# Patient Record
Sex: FEMALE | Race: BLACK OR AFRICAN AMERICAN
[De-identification: names, ages, dates, MRNs, and addresses within clinical notes are randomized per-mention and may not be internally consistent; named-entity substitution may affect disease eponyms.]

---

## 2017-05-05 ENCOUNTER — APPOINTMENT (OUTPATIENT)
Dept: ELECTROPHYSIOLOGY | Facility: CLINIC | Age: 82
End: 2017-05-05

## 2017-05-05 VITALS
HEART RATE: 64 BPM | DIASTOLIC BLOOD PRESSURE: 72 MMHG | BODY MASS INDEX: 39.51 KG/M2 | WEIGHT: 223 LBS | HEIGHT: 63 IN | SYSTOLIC BLOOD PRESSURE: 178 MMHG

## 2017-08-21 ENCOUNTER — APPOINTMENT (OUTPATIENT)
Dept: ELECTROPHYSIOLOGY | Facility: CLINIC | Age: 82
End: 2017-08-21

## 2017-11-27 ENCOUNTER — APPOINTMENT (OUTPATIENT)
Dept: ELECTROPHYSIOLOGY | Facility: CLINIC | Age: 82
End: 2017-11-27

## 2017-12-14 ENCOUNTER — APPOINTMENT (OUTPATIENT)
Dept: ELECTROPHYSIOLOGY | Facility: CLINIC | Age: 82
End: 2017-12-14
Payer: MEDICARE

## 2017-12-14 PROCEDURE — 93294 REM INTERROG EVL PM/LDLS PM: CPT

## 2017-12-14 PROCEDURE — 93296 REM INTERROG EVL PM/IDS: CPT

## 2018-03-21 ENCOUNTER — APPOINTMENT (OUTPATIENT)
Dept: ELECTROPHYSIOLOGY | Facility: CLINIC | Age: 83
End: 2018-03-21
Payer: MEDICARE

## 2018-03-21 PROCEDURE — 93296 REM INTERROG EVL PM/IDS: CPT

## 2018-03-21 PROCEDURE — 93294 REM INTERROG EVL PM/LDLS PM: CPT

## 2018-05-09 ENCOUNTER — APPOINTMENT (OUTPATIENT)
Dept: ELECTROPHYSIOLOGY | Facility: CLINIC | Age: 83
End: 2018-05-09
Payer: MEDICARE

## 2018-05-09 VITALS — BODY MASS INDEX: 39.51 KG/M2 | WEIGHT: 223 LBS | HEIGHT: 63 IN

## 2018-05-09 VITALS — DIASTOLIC BLOOD PRESSURE: 68 MMHG | SYSTOLIC BLOOD PRESSURE: 148 MMHG

## 2018-05-09 PROCEDURE — 93280 PM DEVICE PROGR EVAL DUAL: CPT

## 2018-11-29 ENCOUNTER — APPOINTMENT (OUTPATIENT)
Dept: ELECTROPHYSIOLOGY | Facility: CLINIC | Age: 83
End: 2018-11-29
Payer: MEDICARE

## 2018-11-29 PROCEDURE — 93294 REM INTERROG EVL PM/LDLS PM: CPT

## 2018-11-29 PROCEDURE — 93296 REM INTERROG EVL PM/IDS: CPT

## 2019-01-07 ENCOUNTER — INPATIENT (INPATIENT)
Facility: HOSPITAL | Age: 84
LOS: 6 days | Discharge: HOME | End: 2019-01-14
Attending: INTERNAL MEDICINE | Admitting: INTERNAL MEDICINE
Payer: MEDICARE

## 2019-01-07 VITALS
TEMPERATURE: 98 F | HEIGHT: 63 IN | WEIGHT: 210.1 LBS | RESPIRATION RATE: 20 BRPM | OXYGEN SATURATION: 99 % | HEART RATE: 67 BPM

## 2019-01-07 PROCEDURE — 99285 EMERGENCY DEPT VISIT HI MDM: CPT | Mod: 25

## 2019-01-07 RX ORDER — MECLIZINE HCL 12.5 MG
25 TABLET ORAL ONCE
Qty: 0 | Refills: 0 | Status: COMPLETED | OUTPATIENT
Start: 2019-01-07 | End: 2019-01-07

## 2019-01-07 NOTE — ED PROVIDER NOTE - EYES, MLM
Clear bilaterally, pupils equal, round and reactive to light. eomi positive horizontal nystagmus. no verticle nystagmus

## 2019-01-08 DIAGNOSIS — Z95.0 PRESENCE OF CARDIAC PACEMAKER: Chronic | ICD-10-CM

## 2019-01-08 DIAGNOSIS — Z90.49 ACQUIRED ABSENCE OF OTHER SPECIFIED PARTS OF DIGESTIVE TRACT: Chronic | ICD-10-CM

## 2019-01-08 DIAGNOSIS — Z90.710 ACQUIRED ABSENCE OF BOTH CERVIX AND UTERUS: Chronic | ICD-10-CM

## 2019-01-08 LAB
ABO RH CONFIRMATION: SIGNIFICANT CHANGE UP
ADD ON TEST-SPECIMEN IN LAB: SIGNIFICANT CHANGE UP
ALBUMIN SERPL ELPH-MCNC: 3.2 G/DL — LOW (ref 3.3–5)
ALP SERPL-CCNC: 71 U/L — SIGNIFICANT CHANGE UP (ref 40–120)
ALT FLD-CCNC: 31 U/L — SIGNIFICANT CHANGE UP (ref 12–78)
ANION GAP SERPL CALC-SCNC: 7 MMOL/L — SIGNIFICANT CHANGE UP (ref 5–17)
ANION GAP SERPL CALC-SCNC: 8 MMOL/L — SIGNIFICANT CHANGE UP (ref 5–17)
AST SERPL-CCNC: 33 U/L — SIGNIFICANT CHANGE UP (ref 15–37)
BILIRUB SERPL-MCNC: 0.4 MG/DL — SIGNIFICANT CHANGE UP (ref 0.2–1.2)
BLD GP AB SCN SERPL QL: SIGNIFICANT CHANGE UP
BUN SERPL-MCNC: 36 MG/DL — HIGH (ref 7–23)
BUN SERPL-MCNC: 38 MG/DL — HIGH (ref 7–23)
CALCIUM SERPL-MCNC: 8.2 MG/DL — LOW (ref 8.5–10.1)
CALCIUM SERPL-MCNC: 8.3 MG/DL — LOW (ref 8.5–10.1)
CHLORIDE SERPL-SCNC: 108 MMOL/L — SIGNIFICANT CHANGE UP (ref 96–108)
CHLORIDE SERPL-SCNC: 108 MMOL/L — SIGNIFICANT CHANGE UP (ref 96–108)
CO2 SERPL-SCNC: 27 MMOL/L — SIGNIFICANT CHANGE UP (ref 22–31)
CO2 SERPL-SCNC: 28 MMOL/L — SIGNIFICANT CHANGE UP (ref 22–31)
CREAT SERPL-MCNC: 1.36 MG/DL — HIGH (ref 0.5–1.3)
CREAT SERPL-MCNC: 1.69 MG/DL — HIGH (ref 0.5–1.3)
GLUCOSE BLDC GLUCOMTR-MCNC: 110 MG/DL — HIGH (ref 70–99)
GLUCOSE BLDC GLUCOMTR-MCNC: 131 MG/DL — HIGH (ref 70–99)
GLUCOSE BLDC GLUCOMTR-MCNC: 176 MG/DL — HIGH (ref 70–99)
GLUCOSE SERPL-MCNC: 134 MG/DL — HIGH (ref 70–99)
GLUCOSE SERPL-MCNC: 153 MG/DL — HIGH (ref 70–99)
HCT VFR BLD CALC: 41.3 % — SIGNIFICANT CHANGE UP (ref 34.5–45)
HCT VFR BLD CALC: 42.5 % — SIGNIFICANT CHANGE UP (ref 34.5–45)
HGB BLD-MCNC: 13.1 G/DL — SIGNIFICANT CHANGE UP (ref 11.5–15.5)
HGB BLD-MCNC: 13.7 G/DL — SIGNIFICANT CHANGE UP (ref 11.5–15.5)
INR BLD: 3.73 RATIO — HIGH (ref 0.88–1.16)
MCHC RBC-ENTMCNC: 27.8 PG — SIGNIFICANT CHANGE UP (ref 27–34)
MCHC RBC-ENTMCNC: 28.6 PG — SIGNIFICANT CHANGE UP (ref 27–34)
MCHC RBC-ENTMCNC: 31.7 GM/DL — LOW (ref 32–36)
MCHC RBC-ENTMCNC: 32.2 GM/DL — SIGNIFICANT CHANGE UP (ref 32–36)
MCV RBC AUTO: 87.5 FL — SIGNIFICANT CHANGE UP (ref 80–100)
MCV RBC AUTO: 88.7 FL — SIGNIFICANT CHANGE UP (ref 80–100)
NRBC # BLD: 0 /100 WBCS — SIGNIFICANT CHANGE UP (ref 0–0)
NRBC # BLD: 0 /100 WBCS — SIGNIFICANT CHANGE UP (ref 0–0)
PLATELET # BLD AUTO: 147 K/UL — LOW (ref 150–400)
PLATELET # BLD AUTO: 155 K/UL — SIGNIFICANT CHANGE UP (ref 150–400)
POTASSIUM SERPL-MCNC: 3.9 MMOL/L — SIGNIFICANT CHANGE UP (ref 3.5–5.3)
POTASSIUM SERPL-MCNC: 4 MMOL/L — SIGNIFICANT CHANGE UP (ref 3.5–5.3)
POTASSIUM SERPL-SCNC: 3.9 MMOL/L — SIGNIFICANT CHANGE UP (ref 3.5–5.3)
POTASSIUM SERPL-SCNC: 4 MMOL/L — SIGNIFICANT CHANGE UP (ref 3.5–5.3)
PROT SERPL-MCNC: 6.9 GM/DL — SIGNIFICANT CHANGE UP (ref 6–8.3)
PROTHROM AB SERPL-ACNC: 43.1 SEC — HIGH (ref 10–12.9)
RBC # BLD: 4.72 M/UL — SIGNIFICANT CHANGE UP (ref 3.8–5.2)
RBC # BLD: 4.79 M/UL — SIGNIFICANT CHANGE UP (ref 3.8–5.2)
RBC # FLD: 15.3 % — HIGH (ref 10.3–14.5)
RBC # FLD: 15.9 % — HIGH (ref 10.3–14.5)
SODIUM SERPL-SCNC: 143 MMOL/L — SIGNIFICANT CHANGE UP (ref 135–145)
SODIUM SERPL-SCNC: 143 MMOL/L — SIGNIFICANT CHANGE UP (ref 135–145)
TROPONIN I SERPL-MCNC: 0.13 NG/ML — HIGH (ref 0.01–0.04)
TROPONIN I SERPL-MCNC: 0.13 NG/ML — HIGH (ref 0.01–0.04)
TROPONIN I SERPL-MCNC: 0.15 NG/ML — HIGH (ref 0.01–0.04)
TYPE + AB SCN PNL BLD: SIGNIFICANT CHANGE UP
WBC # BLD: 14.35 K/UL — HIGH (ref 3.8–10.5)
WBC # BLD: 14.55 K/UL — HIGH (ref 3.8–10.5)
WBC # FLD AUTO: 14.35 K/UL — HIGH (ref 3.8–10.5)
WBC # FLD AUTO: 14.55 K/UL — HIGH (ref 3.8–10.5)

## 2019-01-08 PROCEDURE — 93010 ELECTROCARDIOGRAM REPORT: CPT

## 2019-01-08 PROCEDURE — 99222 1ST HOSP IP/OBS MODERATE 55: CPT

## 2019-01-08 PROCEDURE — 70450 CT HEAD/BRAIN W/O DYE: CPT | Mod: 26

## 2019-01-08 PROCEDURE — 71045 X-RAY EXAM CHEST 1 VIEW: CPT | Mod: 26

## 2019-01-08 RX ORDER — DEXTROSE 50 % IN WATER 50 %
12.5 SYRINGE (ML) INTRAVENOUS ONCE
Qty: 0 | Refills: 0 | Status: DISCONTINUED | OUTPATIENT
Start: 2019-01-08 | End: 2019-01-14

## 2019-01-08 RX ORDER — WARFARIN SODIUM 2.5 MG/1
2 TABLET ORAL ONCE
Qty: 0 | Refills: 0 | Status: COMPLETED | OUTPATIENT
Start: 2019-01-08 | End: 2019-01-08

## 2019-01-08 RX ORDER — METOPROLOL TARTRATE 50 MG
50 TABLET ORAL DAILY
Qty: 0 | Refills: 0 | Status: DISCONTINUED | OUTPATIENT
Start: 2019-01-08 | End: 2019-01-08

## 2019-01-08 RX ORDER — ALBUTEROL 90 UG/1
2.5 AEROSOL, METERED ORAL EVERY 6 HOURS
Qty: 0 | Refills: 0 | Status: DISCONTINUED | OUTPATIENT
Start: 2019-01-08 | End: 2019-01-14

## 2019-01-08 RX ORDER — INSULIN LISPRO 100/ML
VIAL (ML) SUBCUTANEOUS
Qty: 0 | Refills: 0 | Status: DISCONTINUED | OUTPATIENT
Start: 2019-01-08 | End: 2019-01-14

## 2019-01-08 RX ORDER — SENNA PLUS 8.6 MG/1
2 TABLET ORAL AT BEDTIME
Qty: 0 | Refills: 0 | Status: DISCONTINUED | OUTPATIENT
Start: 2019-01-08 | End: 2019-01-14

## 2019-01-08 RX ORDER — FUROSEMIDE 40 MG
20 TABLET ORAL DAILY
Qty: 0 | Refills: 0 | Status: DISCONTINUED | OUTPATIENT
Start: 2019-01-08 | End: 2019-01-14

## 2019-01-08 RX ORDER — LOSARTAN POTASSIUM 100 MG/1
100 TABLET, FILM COATED ORAL DAILY
Qty: 0 | Refills: 0 | Status: DISCONTINUED | OUTPATIENT
Start: 2019-01-08 | End: 2019-01-14

## 2019-01-08 RX ORDER — HYDRALAZINE HCL 50 MG
5 TABLET ORAL ONCE
Qty: 0 | Refills: 0 | Status: COMPLETED | OUTPATIENT
Start: 2019-01-08 | End: 2019-01-08

## 2019-01-08 RX ORDER — DIAZEPAM 5 MG
2 TABLET ORAL EVERY 8 HOURS
Qty: 0 | Refills: 0 | Status: DISCONTINUED | OUTPATIENT
Start: 2019-01-08 | End: 2019-01-10

## 2019-01-08 RX ORDER — GLUCAGON INJECTION, SOLUTION 0.5 MG/.1ML
1 INJECTION, SOLUTION SUBCUTANEOUS ONCE
Qty: 0 | Refills: 0 | Status: DISCONTINUED | OUTPATIENT
Start: 2019-01-08 | End: 2019-01-14

## 2019-01-08 RX ORDER — WARFARIN SODIUM 2.5 MG/1
2 TABLET ORAL DAILY
Qty: 0 | Refills: 0 | Status: DISCONTINUED | OUTPATIENT
Start: 2019-01-08 | End: 2019-01-08

## 2019-01-08 RX ORDER — DOCUSATE SODIUM 100 MG
100 CAPSULE ORAL THREE TIMES A DAY
Qty: 0 | Refills: 0 | Status: DISCONTINUED | OUTPATIENT
Start: 2019-01-08 | End: 2019-01-14

## 2019-01-08 RX ORDER — ALBUTEROL 90 UG/1
2.5 AEROSOL, METERED ORAL EVERY 6 HOURS
Qty: 0 | Refills: 0 | Status: DISCONTINUED | OUTPATIENT
Start: 2019-01-08 | End: 2019-01-08

## 2019-01-08 RX ORDER — ACETAMINOPHEN 500 MG
650 TABLET ORAL EVERY 6 HOURS
Qty: 0 | Refills: 0 | Status: DISCONTINUED | OUTPATIENT
Start: 2019-01-08 | End: 2019-01-14

## 2019-01-08 RX ORDER — FLUTICASONE PROPIONATE 50 MCG
1 SPRAY, SUSPENSION NASAL
Qty: 0 | Refills: 0 | Status: DISCONTINUED | OUTPATIENT
Start: 2019-01-08 | End: 2019-01-14

## 2019-01-08 RX ORDER — MECLIZINE HCL 12.5 MG
25 TABLET ORAL ONCE
Qty: 0 | Refills: 0 | Status: COMPLETED | OUTPATIENT
Start: 2019-01-08 | End: 2019-01-08

## 2019-01-08 RX ORDER — DEXTROSE 50 % IN WATER 50 %
15 SYRINGE (ML) INTRAVENOUS ONCE
Qty: 0 | Refills: 0 | Status: DISCONTINUED | OUTPATIENT
Start: 2019-01-08 | End: 2019-01-14

## 2019-01-08 RX ORDER — DILTIAZEM HCL 120 MG
180 CAPSULE, EXT RELEASE 24 HR ORAL DAILY
Qty: 0 | Refills: 0 | Status: DISCONTINUED | OUTPATIENT
Start: 2019-01-08 | End: 2019-01-14

## 2019-01-08 RX ORDER — METOPROLOL TARTRATE 50 MG
50 TABLET ORAL DAILY
Qty: 0 | Refills: 0 | Status: DISCONTINUED | OUTPATIENT
Start: 2019-01-08 | End: 2019-01-14

## 2019-01-08 RX ORDER — SODIUM CHLORIDE 9 MG/ML
1000 INJECTION, SOLUTION INTRAVENOUS
Qty: 0 | Refills: 0 | Status: DISCONTINUED | OUTPATIENT
Start: 2019-01-08 | End: 2019-01-14

## 2019-01-08 RX ORDER — INSULIN GLARGINE 100 [IU]/ML
40 INJECTION, SOLUTION SUBCUTANEOUS AT BEDTIME
Qty: 0 | Refills: 0 | Status: DISCONTINUED | OUTPATIENT
Start: 2019-01-08 | End: 2019-01-14

## 2019-01-08 RX ADMIN — INSULIN GLARGINE 48 UNIT(S): 100 INJECTION, SOLUTION SUBCUTANEOUS at 22:18

## 2019-01-08 RX ADMIN — Medication 50 MILLIGRAM(S): at 19:00

## 2019-01-08 RX ADMIN — Medication 5 MILLIGRAM(S): at 06:06

## 2019-01-08 RX ADMIN — Medication 25 MILLIGRAM(S): at 01:13

## 2019-01-08 RX ADMIN — Medication 180 MILLIGRAM(S): at 18:59

## 2019-01-08 RX ADMIN — Medication 1: at 17:30

## 2019-01-08 RX ADMIN — LOSARTAN POTASSIUM 100 MILLIGRAM(S): 100 TABLET, FILM COATED ORAL at 10:59

## 2019-01-08 RX ADMIN — Medication 25 MILLIGRAM(S): at 23:42

## 2019-01-08 NOTE — H&P ADULT - HISTORY OF PRESENT ILLNESS
89 ear old woman with history of Afib on Coumadin, PPM, HTN, HLD presented to the ED with 2 days history of dizziness + nausea, no vomiting. She was recently treated with medrol steroid for viral bronchitis (completed on SUnday). Patient stated that she feels like the room is spinning even when she closes her eyes. Patient received antivert in the ED and stated that it helped but she still feels dizzy when she moves or changes position. In the ED, Hypertensive with SBP>190- received hydralazine IV and po antihypertensives restarted. +troponin. 89 ear old woman with history of Afib on Coumadin, PPM, HTN, HLD presented to the ED with 2 days history of dizziness + nausea, no vomiting. She was recently treated with medrol steroid for viral bronchitis (completed on SUnday). Patient stated that she feels like the room is spinning even when she closes her eyes. Patient received antivert in the ED and stated that it helped but she still feels dizzy when she moves or changes position. In the ED, Hypertensive with SBP>190- received hydralazine IV and po antihypertensives restarted. +troponin.     [facility]- No family history of stroke or heart disease 89 year old woman with history of Afib on Coumadin, PPM, HTN, HLD presented to the ED with 2 days history of dizziness + nausea, no vomiting. She was recently treated with medrol dose pack for viral bronchitis (completed on Sunday). Patient stated that she feels like the room is spinning even when she closes her eyes. Patient received antivert in the ED and stated that it helped but she still feels dizzy when she moves or changes position. In the ED, Hypertensive with SBP>190- received hydralazine IV and po antihypertensives restarted. +troponin.     FMH- No family history of stroke or heart disease

## 2019-01-08 NOTE — CONSULT NOTE ADULT - ASSESSMENT
89 year old F with history of Afib on Coumadin, PPM, HTN, HLD, recently treated with medrol dose pack for viral bronchitis, presented to the ED with 2 day history of dizziness, nausea, room spinning sensation, exacerbated by postural head/neck movements; In ED given antivert, helped slightly; SBP >190- received hydralazine IV and po antihypertensives, Troponin were positive.     # Most likely Vertigo; positional; preceded by viral bronchitis    # Uncontrolled HTN    # + troponin r/o ACS/ atrial fibrillation- rate controlled    - continue antihypertensives - losartan, Cartia and Metoprolol - home regimen  - Unable to obtain MRI - patient with PPM, if sx perisist will consider CT angio H/N  - monitor BP  - PT 89 year old F with history of Afib on Coumadin, PPM, HTN, HLD, recently treated with medrol dose pack for viral bronchitis, presented to the ED with 2 day history of dizziness, nausea, room spinning sensation, exacerbated by postural head/neck movements; In ED, given antivert, helped slightly; SBP >190- received hydralazine IV and po antihypertensives, Troponin were positive.     # Most likely Vertigo-positional; preceded by viral bronchitis, improving    # Uncontrolled HTN    # + troponin r/o ACS/ atrial fibrillation- rate controlled    - continue antihypertensives - losartan, Cartia and Metoprolol - home regimen  - Unable to obtain MRI - patient with PPM, if sx perisist or recur, will consider CT angio H/N, at present not deemed necessary  - monitor BP  - PT    D/W pt.

## 2019-01-08 NOTE — ED ADULT NURSE NOTE - OBJECTIVE STATEMENT
pt. c/o of dizziness x 2 days, worsens when she changes positions. pt. states dyspnea on exertion. pt. denies CP, weakness, fevers, abd. pain, n/v/d, back pain

## 2019-01-08 NOTE — H&P ADULT - PMH
HTN (hypertension)    Paroxysmal atrial fibrillation    Type 2 diabetes mellitus with complication, with long-term current use of insulin

## 2019-01-08 NOTE — ED ADULT NURSE NOTE - NSIMPLEMENTINTERV_GEN_ALL_ED
Implemented All Fall with Harm Risk Interventions:  [place] to call system. Call bell, personal items and telephone within reach. Instruct patient to call for assistance. Room bathroom lighting operational. Non-slip footwear when patient is off stretcher. Physically safe environment: no spills, clutter or unnecessary equipment. Stretcher in lowest position, wheels locked, appropriate side rails in place. Provide visual cue, wrist band, yellow gown, etc. Monitor gait and stability. Monitor for mental status changes and reorient to person, place, and time. Review medications for side effects contributing to fall risk. Reinforce activity limits and safety measures with patient and family. Provide visual clues: red socks.

## 2019-01-08 NOTE — H&P ADULT - NSHPPHYSICALEXAM_GEN_ALL_CORE
Vital Signs Last 24 Hrs  T(C): 36.9 (08 Jan 2019 09:49), Max: 36.9 (08 Jan 2019 09:49)  T(F): 98.4 (08 Jan 2019 09:49), Max: 98.4 (08 Jan 2019 09:49)  HR: 76 (08 Jan 2019 09:49) (60 - 93)  BP: 193/77 (08 Jan 2019 09:49) (138/76 - 193/87)  BP(mean): --  RR: 19 (08 Jan 2019 09:49) (15 - 20)  SpO2: 99% (08 Jan 2019 09:49) (99% - 99%)    PE:  Constitutional: NAD, laying in bed  HEENT: NC/AT  Back: no tenderness  Respiratory: respirations even and non labored, LCTA- diminished at the base  Cardiovascular: S1S2 irregular, no murmurs  Abdomen: soft, not tender, not distended, positive BS  Genitourinary: voiding  Rectal: deferred  Musculoskeletal: no muscle tenderness, no joint swelling or tenderness  Extremities: no pedal edema   Neurological: no focal deficits

## 2019-01-08 NOTE — H&P ADULT - ASSESSMENT
89 ear old woman with history of Afib on Coumadin, PPM, HTN, HLD presented to the ED with 2 days history of dizziness + nausea, no vomiting. She was recently treated with medrol steroid for viral bronchitis (completed on SUnday). Patient stated that she feels like the room is spinning even when she closes her eyes. Patient received antivert in the ED and stated that it helped but she still feels dizzy when she moves or changes position. In the ED, Hypertensive with SBP>190- received hydralazine IV and po antihypertensives restarted. +troponin. admitted to telemetry.     *positive troponin r/o ACS/ PPM/ Atrial fibrillation- rate controlled  *dizziness likely related to Uncontrolled HTN- patient recently completed Steroid for viral bronchitis  - patient supposed to be on Lasix 30mg daily, but she stopped taking it (stated that she did not need it)  - in the ED- SBP>190 received hydralazine IV x1   - continue losartan, Cartia and Metoprolol - home regimen  - Cardiology consult- sees Dr Weaver as an outpatient  - EKG results noted  - Coumadin - INR supra therapeutic   - CT head - unremarkable  - Unable to obtain MRI - patient with PPM  - monitor BP    * s/p Viral bronchitis  - completed steroids as an outpatient   - on flonase and Albuterol PRN    *DMT2  - ISS  - continue Lantus 48U qHS  - check A1c    VTE- patient on COumadin with supra therapeutic INR    Patient is full code. Case d/w Dr Raymond.

## 2019-01-08 NOTE — ED ADULT NURSE REASSESSMENT NOTE - NS ED NURSE REASSESS COMMENT FT1
Received care of pt A&Ox3 from Antoinette Auguste RN at change of shift. Pt is admitted to hospital, awaiting orders from hospitalist. Pt updated on plan of care. Pt given menu to order hot meal tray. VSS

## 2019-01-08 NOTE — CONSULT NOTE ADULT - SUBJECTIVE AND OBJECTIVE BOX
CC: 88 y old  Female who presents with a chief complaint of dizziness and positive troponin (08 Jan 2019 11:23)    HPI:  89 year old F with history of Afib on Coumadin, PPM, HTN, HLD, recently treated with medrol dose pack for viral bronchitis - completed on Sunday, she presented to the ED with 2 day history of dizziness with nausea.  Patient reports that she feels like the room is spinning, this persists even when she closes her eyes; dizziness is peristent, especially with postural head/neck movements; she received antivert in ED, it helped slightly.    In the ED, SBP >190- received hydralazine IV and po antihypertensives restarted, Troponin were positive.     PAST MEDICAL & SURGICAL HISTORY:  Type 2 diabetes mellitus with complication, with long-term current use of insulin  Paroxysmal atrial fibrillation  HTN (hypertension)  Artificial cardiac pacemaker  History of appendectomy  H/O: hysterectomy    FAMILY HISTORY:  No family history of stroke  relatives    Social Hx:  Nonsmoker, no drug or alcohol use    MEDICATIONS  (STANDING):  dextrose 5%. 1000 milliLiter(s) (50 mL/Hr) IV Continuous <Continuous>  dextrose 50% Injectable 12.5 Gram(s) IV Push once  diltiazem    milliGRAM(s) Oral daily  docusate sodium 100 milliGRAM(s) Oral three times a day  fluticasone propionate 50 MICROgram(s)/spray Nasal Spray 1 Spray(s) Both Nostrils <User Schedule>  furosemide    Tablet 20 milliGRAM(s) Oral daily  insulin glargine Injectable (LANTUS) 48 Unit(s) SubCutaneous at bedtime  insulin lispro (HumaLOG) corrective regimen sliding scale   SubCutaneous three times a day before meals  losartan 100 milliGRAM(s) Oral daily  metoprolol succinate ER 50 milliGRAM(s) Oral daily  multivitamin 1 Tablet(s) Oral daily  warfarin 2 milliGRAM(s) Oral once       Allergies    No Known Allergies    Intolerances      ROS: Pertinent positives in HPI, all other ROS were reviewed and are negative.      Vital Signs Last 24 Hrs  T(C): 36.6 (08 Jan 2019 14:00), Max: 36.9 (08 Jan 2019 09:49)  T(F): 97.8 (08 Jan 2019 14:00), Max: 98.4 (08 Jan 2019 09:49)  HR: 63 (08 Jan 2019 14:00) (60 - 93)  BP: 155/85 (08 Jan 2019 14:00) (138/76 - 193/87)  BP(mean): --  RR: 19 (08 Jan 2019 14:00) (15 - 20)  SpO2: 93% (08 Jan 2019 14:00) (93% - 99%)    PE:  Constitutional: awake and alert.  HEENT: PERRLA, EOMI,   Neck: Supple.  Respiratory: Breath sounds are clear bilaterally  Cardiovascular: S1 and S2,   Extremities:  no edema  Vascular: Caritid Bruit - no  Musculoskeletal: no joint swelling/tenderness, no abnormal movements  Skin: No rashes    Neurological exam:  HF: A x O x 3. Appropriately interactive, normal affect. Speech fluent, No Aphasia or paraphasic errors. Naming /repetition intact   CN: TEN, EOMI, VFF, facial sensation normal, no NLFD, tongue midline, Palate moves equally, SCM equal bilaterally  Motor: No pronator drift, Strength 5/5 in all 4 ext, normal bulk and tone, no tremor, rigidity    Sens: Intact to light touch     Reflexes: Symmetric, AJ 0, downgoing toes b/l  Coord:  No FNFA, dysmetria, LOUIE intact   Gait/Balance: Cannot test    NIHSS:      Labs:   01-08      143  |  108  |  38<H>  ----------------------------<  153<H>  4.0   |  27  |  1.36<H>    Ca    8.3<L>      08 Jan 2019 12:04    TPro  6.9  /  Alb  3.2<L>  /  TBili  0.4  /  DBili  x   /  AST  33  /  ALT  31  /  AlkPhos  71  01-08                      13.1   14.35 )-----------( 155      ( 08 Jan 2019 12:04 )             41.3     Radiology:  - CT Head:   < from: CT Head No Cont (01.08.19 @ 00:37) >  No acute intracranial hemorrhage, mass effect, or evidence of acute   vascular territorial infarction.    If clinical symptoms persist or worsen, more sensitive evaluation with   brain MRI may be obtained, if no contraindications exist.    < end of copied text > CC: 88 y old  Female who presents with a chief complaint of dizziness and positive troponin (08 Jan 2019 11:23)    HPI:  89 year old F with history of Afib on Coumadin, PPM, HTN, HLD, recently treated with medrol dose pack for viral bronchitis - completed on Sunday, presented to the ED with 2 day history of dizziness and nausea. Patient reports that she felt like the room was spinning, persists even when she closes were eyes; dizziness worsened with postural head/neck movements; she received antivert in ED, it helped slightly. Pt denies HA, diplopia, tinnitus, dysarthria, sensorimotor symptoms, her gait was unstable.    In the ED, SBP >190- received hydralazine IV and po antihypertensives restarted, Troponin were positive.     Pts symptoms have improved by now, mild dizziness occurs intermittently with lateral neck movements.    PAST MEDICAL & SURGICAL HISTORY:  Type 2 diabetes mellitus with complication, with long-term current use of insulin  Paroxysmal atrial fibrillation  HTN (hypertension)  Artificial cardiac pacemaker  History of appendectomy  H/O: hysterectomy    FAMILY HISTORY:  No family history of stroke  relatives    Social Hx:  Nonsmoker, no drug or alcohol use    MEDICATIONS  (STANDING):  dextrose 5%. 1000 milliLiter(s) (50 mL/Hr) IV Continuous <Continuous>  dextrose 50% Injectable 12.5 Gram(s) IV Push once  diltiazem    milliGRAM(s) Oral daily  docusate sodium 100 milliGRAM(s) Oral three times a day  fluticasone propionate 50 MICROgram(s)/spray Nasal Spray 1 Spray(s) Both Nostrils <User Schedule>  furosemide    Tablet 20 milliGRAM(s) Oral daily  insulin glargine Injectable (LANTUS) 48 Unit(s) SubCutaneous at bedtime  insulin lispro (HumaLOG) corrective regimen sliding scale   SubCutaneous three times a day before meals  losartan 100 milliGRAM(s) Oral daily  metoprolol succinate ER 50 milliGRAM(s) Oral daily  multivitamin 1 Tablet(s) Oral daily  warfarin 2 milliGRAM(s) Oral once       Allergies    No Known Allergies    Intolerances      ROS: Pertinent positives in HPI, all other ROS were reviewed and are negative.      Vital Signs Last 24 Hrs  T(C): 36.6 (08 Jan 2019 14:00), Max: 36.9 (08 Jan 2019 09:49)  T(F): 97.8 (08 Jan 2019 14:00), Max: 98.4 (08 Jan 2019 09:49)  HR: 63 (08 Jan 2019 14:00) (60 - 93)  BP: 155/85 (08 Jan 2019 14:00) (138/76 - 193/87)  BP(mean): --  RR: 19 (08 Jan 2019 14:00) (15 - 20)  SpO2: 93% (08 Jan 2019 14:00) (93% - 99%)    PE:  Constitutional: Normocephalic, awake and alert, no distress.  HEENT: PERRLA, EOMI, Right lateral nystagmoid beats  Neck: Supple.  Respiratory: Breath sounds are clear bilaterally  Cardiovascular: S1 and S2,   Extremities:  no edema  Vascular: Caritid Bruit - no  Musculoskeletal: no joint swelling/tenderness, no abnormal movements  Skin: No rashes    Neurological exam:  HF: A x O x 3. Appropriately interactive, normal affect. Speech fluent, No Aphasia or paraphasic errors. Naming /repetition intact   CN: TEN, EOMI, Right lateral nystagmus-rotatory, VFF, facial sensation normal, no NLFD, tongue midline, Palate moves equally, SCM equal bilaterally  Motor: No pronator drift, Strength 5/5 in all 4 ext, normal bulk and tone, no tremor, rigidity    Sens: Intact to light touch     Reflexes: Symmetric, AJ 0, downgoing toes b/l  Coord:  No FNFA, dysmetria, LOUIE intact   Gait/Balance: Cannot test    NIHSS: 0      Labs:   01-08      143  |  108  |  38<H>  ----------------------------<  153<H>  4.0   |  27  |  1.36<H>    Ca    8.3<L>      08 Jan 2019 12:04    TPro  6.9  /  Alb  3.2<L>  /  TBili  0.4  /  DBili  x   /  AST  33  /  ALT  31  /  AlkPhos  71  01-08                      13.1   14.35 )-----------( 155      ( 08 Jan 2019 12:04 )             41.3     Radiology:  - CT Head:   < from: CT Head No Cont (01.08.19 @ 00:37) >  No acute intracranial hemorrhage, mass effect, or evidence of acute   vascular territorial infarction.    If clinical symptoms persist or worsen, more sensitive evaluation with   brain MRI may be obtained, if no contraindications exist.    < end of copied text >

## 2019-01-08 NOTE — H&P ADULT - NSHPREVIEWOFSYSTEMS_GEN_ALL_CORE
REVIEW OF SYSTEMS:    CONSTITUTIONAL: No weakness, fevers or chills  EYES/ENT: No visual changes;  + dizziness, +nausea, no vomiting  NECK: No pain or stiffness  RESPIRATORY: No cough, wheezing, hemoptysis; No shortness of breath  CARDIOVASCULAR: No chest pain or palpitations  GASTROINTESTINAL: No abdominal or epigastric pain. + nausea, no vomiting, or hematemesis; No diarrhea or constipation. No melena or hematochezia.  GENITOURINARY: No dysuria, frequency or hematuria  NEUROLOGICAL: No numbness or weakness  SKIN: No itching, burning, rashes, or lesions   All other review of systems is negative unless indicated above.

## 2019-01-08 NOTE — INPATIENT CERTIFICATION FOR MEDICARE PATIENTS - RISKS OF ADVERSE EVENTS
Concern for delay in diagnosis and treatment/Concern for neurologic deterioration/Concern for worsening infectious process/Concern for renal deterioration/Concern for cardiopulmonary deterioration

## 2019-01-09 LAB
ANION GAP SERPL CALC-SCNC: 5 MMOL/L — SIGNIFICANT CHANGE UP (ref 5–17)
APPEARANCE UR: CLEAR — SIGNIFICANT CHANGE UP
APTT BLD: 35.3 SEC — SIGNIFICANT CHANGE UP (ref 27.5–36.3)
BILIRUB UR-MCNC: NEGATIVE — SIGNIFICANT CHANGE UP
BUN SERPL-MCNC: 36 MG/DL — HIGH (ref 7–23)
CALCIUM SERPL-MCNC: 8.3 MG/DL — LOW (ref 8.5–10.1)
CHLORIDE SERPL-SCNC: 109 MMOL/L — HIGH (ref 96–108)
CO2 SERPL-SCNC: 28 MMOL/L — SIGNIFICANT CHANGE UP (ref 22–31)
COLOR SPEC: YELLOW — SIGNIFICANT CHANGE UP
CREAT SERPL-MCNC: 1.24 MG/DL — SIGNIFICANT CHANGE UP (ref 0.5–1.3)
DIFF PNL FLD: NEGATIVE — SIGNIFICANT CHANGE UP
GLUCOSE BLDC GLUCOMTR-MCNC: 118 MG/DL — HIGH (ref 70–99)
GLUCOSE BLDC GLUCOMTR-MCNC: 126 MG/DL — HIGH (ref 70–99)
GLUCOSE BLDC GLUCOMTR-MCNC: 171 MG/DL — HIGH (ref 70–99)
GLUCOSE BLDC GLUCOMTR-MCNC: 81 MG/DL — SIGNIFICANT CHANGE UP (ref 70–99)
GLUCOSE SERPL-MCNC: 63 MG/DL — LOW (ref 70–99)
GLUCOSE UR QL: NEGATIVE MG/DL — SIGNIFICANT CHANGE UP
HBA1C BLD-MCNC: 6.5 % — HIGH (ref 4–5.6)
INR BLD: 2.53 RATIO — HIGH (ref 0.88–1.16)
KETONES UR-MCNC: NEGATIVE — SIGNIFICANT CHANGE UP
LEUKOCYTE ESTERASE UR-ACNC: NEGATIVE — SIGNIFICANT CHANGE UP
NITRITE UR-MCNC: NEGATIVE — SIGNIFICANT CHANGE UP
PH UR: 5 — SIGNIFICANT CHANGE UP (ref 5–8)
POTASSIUM SERPL-MCNC: 3.9 MMOL/L — SIGNIFICANT CHANGE UP (ref 3.5–5.3)
POTASSIUM SERPL-SCNC: 3.9 MMOL/L — SIGNIFICANT CHANGE UP (ref 3.5–5.3)
PROT UR-MCNC: NEGATIVE MG/DL — SIGNIFICANT CHANGE UP
PROTHROM AB SERPL-ACNC: 28.9 SEC — HIGH (ref 10–12.9)
SODIUM SERPL-SCNC: 142 MMOL/L — SIGNIFICANT CHANGE UP (ref 135–145)
SP GR SPEC: 1.02 — SIGNIFICANT CHANGE UP (ref 1.01–1.02)
UROBILINOGEN FLD QL: NEGATIVE MG/DL — SIGNIFICANT CHANGE UP

## 2019-01-09 PROCEDURE — 93880 EXTRACRANIAL BILAT STUDY: CPT | Mod: 26

## 2019-01-09 PROCEDURE — 93306 TTE W/DOPPLER COMPLETE: CPT | Mod: 26

## 2019-01-09 RX ORDER — WARFARIN SODIUM 2.5 MG/1
2 TABLET ORAL ONCE
Qty: 0 | Refills: 0 | Status: COMPLETED | OUTPATIENT
Start: 2019-01-09 | End: 2019-01-09

## 2019-01-09 RX ORDER — FUROSEMIDE 40 MG
1.5 TABLET ORAL
Qty: 0 | Refills: 0 | COMMUNITY

## 2019-01-09 RX ORDER — DILTIAZEM HCL 120 MG
1 CAPSULE, EXT RELEASE 24 HR ORAL
Qty: 0 | Refills: 0 | COMMUNITY

## 2019-01-09 RX ORDER — FLUTICASONE PROPIONATE 220 MCG
1 AEROSOL WITH ADAPTER (GRAM) INHALATION
Qty: 0 | Refills: 0 | COMMUNITY

## 2019-01-09 RX ORDER — METOPROLOL TARTRATE 50 MG
1 TABLET ORAL
Qty: 0 | Refills: 0 | COMMUNITY

## 2019-01-09 RX ADMIN — Medication 20 MILLIGRAM(S): at 12:01

## 2019-01-09 RX ADMIN — Medication 1 TABLET(S): at 12:01

## 2019-01-09 RX ADMIN — WARFARIN SODIUM 2 MILLIGRAM(S): 2.5 TABLET ORAL at 21:23

## 2019-01-09 RX ADMIN — Medication 50 MILLIGRAM(S): at 18:17

## 2019-01-09 RX ADMIN — Medication 180 MILLIGRAM(S): at 18:17

## 2019-01-09 RX ADMIN — INSULIN GLARGINE 48 UNIT(S): 100 INJECTION, SOLUTION SUBCUTANEOUS at 21:22

## 2019-01-09 RX ADMIN — Medication 1 SPRAY(S): at 14:49

## 2019-01-09 RX ADMIN — LOSARTAN POTASSIUM 100 MILLIGRAM(S): 100 TABLET, FILM COATED ORAL at 05:40

## 2019-01-09 NOTE — CONSULT NOTE ADULT - ASSESSMENT
Dizziness probably due to vertigo.  Moderate aortic stenosis.  Paroxysmal atrial fibrillation.  Elevated troponin I but no chest pains or shortness of breath.  Sick sinus syndrome status post permanent pacemaker placement in the past.  Morbid obesity.  Suggest  Observe on telemetry.  Increase oral fluids.  Continue with Antivert for when necessary basis.  Continue with current cardiac medications.  She is allergic to aspirin.  Will check pacemaker.  Echocardiogram to evaluate aortic stenosis and  wall motion.

## 2019-01-09 NOTE — CONSULT NOTE ADULT - SUBJECTIVE AND OBJECTIVE BOX
Patient is a 88y old  Female who presents with a chief complaint of dizziness .      HPI:  Patient is a 88-year-old she has a history of hypertension, type II diabetes mellitus, paroxysmal atrial fibrillation status post permanent pacemaker placement in January 2009, morbid obesity, status post cardiac catheterization in June 2009 she had normal coronaries, moderate aortic stenosis, she was admitted with complains of dizziness, this dizziness is persistent and gets worse when she moves her head. She denies any headache or any blurred vision. At times she feels nauseous. No vomiting. She denies any chest pain. Dyspnea with moderate exertion but overall exercise tolerance has been stable. On admission her troponin I was noted a mildly elevated. But patient has no chest pain or shortness of breath. She continues to be normal sinus rhythm on telemetry with atrial pacing. She is now comfortable but complains of dizziness when she moves her head in certain position.      PAST MEDICAL & SURGICAL HISTORY:  Type 2 diabetes mellitus with complication, with long-term current use of insulin  Paroxysmal atrial fibrillation  HTN (hypertension)  Artificial cardiac pacemaker  History of appendectomy  H/O: hysterectomy      MEDICATIONS  (STANDING):  dextrose 5%. 1000 milliLiter(s) (50 mL/Hr) IV Continuous <Continuous>  dextrose 50% Injectable 12.5 Gram(s) IV Push once  diltiazem    milliGRAM(s) Oral daily  docusate sodium 100 milliGRAM(s) Oral three times a day  fluticasone propionate 50 MICROgram(s)/spray Nasal Spray 1 Spray(s) Both Nostrils <User Schedule>  furosemide    Tablet 20 milliGRAM(s) Oral daily  insulin glargine Injectable (LANTUS) 48 Unit(s) SubCutaneous at bedtime  insulin lispro (HumaLOG) corrective regimen sliding scale   SubCutaneous three times a day before meals  losartan 100 milliGRAM(s) Oral daily  metoprolol succinate ER 50 milliGRAM(s) Oral daily  multivitamin 1 Tablet(s) Oral daily    MEDICATIONS  (PRN):  acetaminophen   Tablet .. 650 milliGRAM(s) Oral every 6 hours PRN Temp greater or equal to 38C (100.4F), Mild Pain (1 - 3)  ALBUTerol    0.083% 2.5 milliGRAM(s) Nebulizer every 6 hours PRN Bronchospasm  dextrose 40% Gel 15 Gram(s) Oral once PRN Blood Glucose LESS THAN 70 milliGRAM(s)/deciliter  diazepam    Tablet 2 milliGRAM(s) Oral every 8 hours PRN vertigo  glucagon  Injectable 1 milliGRAM(s) IntraMuscular once PRN Glucose LESS THAN 70 milligrams/deciliter  senna 2 Tablet(s) Oral at bedtime PRN Constipation      FAMILY HISTORY:  No pertinent family history in first degree relatives      SOCIAL HISTORY: No history of smoking or any alcohol consumption.    REVIEW OF SYSTEM:  Pertinent items are noted in HPI.  Constitutional	Negative for chills, fevers, sweats.    Eyes: 	Negative for visual disturbance.  Ears, nose, mouth, throat, and face: Negative for epistaxis, nasal congestion, sore throat and tinnitus.  Neck:	Negative for enlargement, pain and difficulty in swallowing  Respiration : Negative for cough, dyspnea on exertion, pleuritic chest pain and wheezing  Cardiovascular: Negative for chest pain, dyspnea and palpitations    Gastrointestinal : Negative for abdominal pain, diarrhea, nausea and vomiting  Genitourinary: Negative for dysuria, frequency and urinary incontinence .  Skin: Negative for  rash, pruritus, swelling, dryness .  	  Hematologic/lymphatic: Negative for bleeding and easy bruising  Musculoskeletal: Negative for arthralgias, back pain and muscle weakness.  Neurological: Notable  for dizziness, headaches . No  seizures and tremors  Behavioral/Psych: Negative for mood change, depression.  Endocrine:	Negative for blurry vision, polydipsia and polyuria, diaphoresis.   Allergic/Immunologic:	Negative for anaphylaxis, angioedema and urticaria.      Vital Signs Last 24 Hrs  T(C): 36.7 (09 Jan 2019 10:44), Max: 36.7 (08 Jan 2019 16:15)  T(F): 98 (09 Jan 2019 10:44), Max: 98.1 (08 Jan 2019 16:15)  HR: 64 (09 Jan 2019 10:44) (63 - 76)  BP: 162/53 (09 Jan 2019 10:44) (134/44 - 163/75)  BP(mean): --  RR: 18 (09 Jan 2019 10:44) (17 - 19)  SpO2: 95% (09 Jan 2019 10:44) (91% - 99%)    I&O's Summary    PHYSICAL EXAM  General Appearance: cooperative, no acute distress,   HEENT: PERRL, conjunctiva clear, EOM's intact .   Neck: Supple, , no adenopathy, thyroid: not enlarged, no carotid bruit or JVD  Lungs: Clear to auscultation bilateral,no adventitious breath sounds, normal   expiratory phase  Heart: Regular rate and rhythm, S1, S2 normal,2/6 ES murmur, no  rub or gallop  Abdomen: Soft, non-tender, bowel sounds active , no hepatosplenomegaly  Extremities: no cyanosis or edema, no joint swelling  Skin: Skin color, texture normal, no rashes   Neurologic: Alert and oriented X3 , cranial nerves intact, sensory and motor normal,        INTERPRETATION OF TELEMETRY: A trial pacing     ECG: Atrial pacing nnonspecific ST-T changes.        LABS:                          13.1   14.35 )-----------( 155      ( 08 Jan 2019 12:04 )             41.3     01-09    142  |  109<H>  |  36<H>  ----------------------------<  63<L>  3.9   |  28  |  1.24    Ca    8.3<L>      09 Jan 2019 05:27    TPro  6.9  /  Alb  3.2<L>  /  TBili  0.4  /  DBili  x   /  AST  33  /  ALT  31  /  AlkPhos  71  01-08    CARDIAC MARKERS ( 08 Jan 2019 16:31 )  0.130 ng/mL / x     / x     / x     / x      CARDIAC MARKERS ( 08 Jan 2019 07:35 )  0.128 ng/mL / x     / x     / x     / x      CARDIAC MARKERS ( 08 Jan 2019 00:19 )  0.148 ng/mL / x     / x     / x     / x              PT/INR - ( 09 Jan 2019 05:27 )   PT: 28.9 sec;   INR: 2.53 ratio         PTT - ( 09 Jan 2019 05:27 )  PTT:35.3 sec

## 2019-01-09 NOTE — PROGRESS NOTE ADULT - ASSESSMENT
88/F with history of Afib on Coumadin, PPM, HTN, HLD, admitted with     *positive troponin r/o ACS/ PPM/ Atrial fibrillation- rate controlled  *dizziness likely related to Uncontrolled HTN- patient recently completed Steroid for viral bronchitis  - patient supposed to be on Lasix 30mg daily, but she stopped taking it (stated that she did not need it)  - in the ED- SBP>190 received hydralazine IV x1   - continue losartan, Cartia and Metoprolol - home regimen  - Cardiology consult- sees Dr Weaver as an outpatient, appreciated   - CT head - unremarkable, repeat in am  - Unable to obtain MRI - patient with PPM  - monitor BP,  - carotid doppler showed some plaque b/l  - echo noted  coumadin decreased to 2 mg from home dose of 2.5 sec to elevated INR; monitor INR daily    * s/p Viral bronchitis  - completed steroids as an outpatient   - on flonase and Albuterol PRN    *DMT2  - ISS  - continue Lantus 48U qHS  - checked A1c; 6.5    GURVINDER: resolved  check bmp in am    Leukocytosis: ? source  check in am    VTE- on coumadin    poc discussed with pt, her , team.

## 2019-01-10 LAB
ANION GAP SERPL CALC-SCNC: 5 MMOL/L — SIGNIFICANT CHANGE UP (ref 5–17)
APTT BLD: 29.3 SEC — SIGNIFICANT CHANGE UP (ref 27.5–36.3)
BUN SERPL-MCNC: 36 MG/DL — HIGH (ref 7–23)
CALCIUM SERPL-MCNC: 8.3 MG/DL — LOW (ref 8.5–10.1)
CHLORIDE SERPL-SCNC: 110 MMOL/L — HIGH (ref 96–108)
CO2 SERPL-SCNC: 27 MMOL/L — SIGNIFICANT CHANGE UP (ref 22–31)
CREAT SERPL-MCNC: 1.29 MG/DL — SIGNIFICANT CHANGE UP (ref 0.5–1.3)
GLUCOSE BLDC GLUCOMTR-MCNC: 142 MG/DL — HIGH (ref 70–99)
GLUCOSE BLDC GLUCOMTR-MCNC: 148 MG/DL — HIGH (ref 70–99)
GLUCOSE BLDC GLUCOMTR-MCNC: 160 MG/DL — HIGH (ref 70–99)
GLUCOSE BLDC GLUCOMTR-MCNC: 82 MG/DL — SIGNIFICANT CHANGE UP (ref 70–99)
GLUCOSE SERPL-MCNC: 63 MG/DL — LOW (ref 70–99)
HCT VFR BLD CALC: 38.1 % — SIGNIFICANT CHANGE UP (ref 34.5–45)
HGB BLD-MCNC: 11.8 G/DL — SIGNIFICANT CHANGE UP (ref 11.5–15.5)
INR BLD: 1.94 RATIO — HIGH (ref 0.88–1.16)
MCHC RBC-ENTMCNC: 27.4 PG — SIGNIFICANT CHANGE UP (ref 27–34)
MCHC RBC-ENTMCNC: 31 GM/DL — LOW (ref 32–36)
MCV RBC AUTO: 88.6 FL — SIGNIFICANT CHANGE UP (ref 80–100)
NRBC # BLD: 0 /100 WBCS — SIGNIFICANT CHANGE UP (ref 0–0)
PLATELET # BLD AUTO: 109 K/UL — LOW (ref 150–400)
POTASSIUM SERPL-MCNC: 4 MMOL/L — SIGNIFICANT CHANGE UP (ref 3.5–5.3)
POTASSIUM SERPL-SCNC: 4 MMOL/L — SIGNIFICANT CHANGE UP (ref 3.5–5.3)
PROTHROM AB SERPL-ACNC: 22 SEC — HIGH (ref 10–12.9)
RBC # BLD: 4.3 M/UL — SIGNIFICANT CHANGE UP (ref 3.8–5.2)
RBC # FLD: 16 % — HIGH (ref 10.3–14.5)
SODIUM SERPL-SCNC: 142 MMOL/L — SIGNIFICANT CHANGE UP (ref 135–145)
WBC # BLD: 13.09 K/UL — HIGH (ref 3.8–10.5)
WBC # FLD AUTO: 13.09 K/UL — HIGH (ref 3.8–10.5)

## 2019-01-10 PROCEDURE — 70450 CT HEAD/BRAIN W/O DYE: CPT | Mod: 26

## 2019-01-10 RX ORDER — MECLIZINE HCL 12.5 MG
12.5 TABLET ORAL THREE TIMES A DAY
Qty: 0 | Refills: 0 | Status: DISCONTINUED | OUTPATIENT
Start: 2019-01-10 | End: 2019-01-12

## 2019-01-10 RX ORDER — WARFARIN SODIUM 2.5 MG/1
2.5 TABLET ORAL DAILY
Qty: 0 | Refills: 0 | Status: DISCONTINUED | OUTPATIENT
Start: 2019-01-10 | End: 2019-01-12

## 2019-01-10 RX ORDER — APIXABAN 2.5 MG/1
5 TABLET, FILM COATED ORAL EVERY 12 HOURS
Qty: 0 | Refills: 0 | Status: DISCONTINUED | OUTPATIENT
Start: 2019-01-10 | End: 2019-01-10

## 2019-01-10 RX ADMIN — Medication 12.5 MILLIGRAM(S): at 16:06

## 2019-01-10 RX ADMIN — Medication 1 TABLET(S): at 12:11

## 2019-01-10 RX ADMIN — WARFARIN SODIUM 2.5 MILLIGRAM(S): 2.5 TABLET ORAL at 22:54

## 2019-01-10 RX ADMIN — Medication 180 MILLIGRAM(S): at 05:48

## 2019-01-10 RX ADMIN — LOSARTAN POTASSIUM 100 MILLIGRAM(S): 100 TABLET, FILM COATED ORAL at 05:48

## 2019-01-10 RX ADMIN — INSULIN GLARGINE 40 UNIT(S): 100 INJECTION, SOLUTION SUBCUTANEOUS at 22:54

## 2019-01-10 RX ADMIN — Medication 1: at 17:24

## 2019-01-10 RX ADMIN — Medication 50 MILLIGRAM(S): at 05:48

## 2019-01-10 NOTE — PROGRESS NOTE ADULT - ASSESSMENT
Dizziness probably due to vertigo.  Moderate aortic stenosis.  Paroxysmal atrial fibrillation.  Elevated troponin I but no chest pains or shortness of breath.  Sick sinus syndrome status post permanent pacemaker placement in the past.  Morbid obesity.  Suggest  Observe on telemetry.  Increase oral fluids.  Continue with Antivert for when necessary basis.  Continue with current cardiac medications.  Stop Warfarin  & start Eliquis .   She is allergic to aspirin.

## 2019-01-10 NOTE — PHARMACOTHERAPY INTERVENTION NOTE - INTERVENTION TYPE RECOOMEND
Dose Optimization/Non-renal Dose Adjustment
Therapy Recommended - Alternative treatment
Dose Optimization/Non-renal Dose Adjustment

## 2019-01-10 NOTE — PHARMACOTHERAPY INTERVENTION NOTE - COMMENTS
Spoke to Dr. Le and suggested lowering Lantus dose
Recommended changing valium to meclizine prn dizziness
Spoke to Dr. Le and suggested warfarin dose of 2mg as INR is 2.53
Completed med rec. Home medications reviewed with patient and confirmed with Dr Conn  Patient sporadically takes furosemide. Counseled on the importance of taking it daily as directed

## 2019-01-10 NOTE — PROGRESS NOTE ADULT - ASSESSMENT
88/F with history of Afib on Coumadin, PPM, HTN, HLD, admitted with     *positive troponin r/o ACS/ PPM/ Atrial fibrillation- rate controlled  *dizziness likely related to Uncontrolled HTN- patient recently completed Steroid for viral bronchitis  - patient supposed to be on Lasix 30mg daily, continue  - in the ED- SBP>190 received hydralazine IV x1   - continue losartan, Cartia and Metoprolol - home regimen  - Cardiology consult- with Dr Weaver, appreciated   - CT head - unremarkable, repeat in am  - Unable to obtain MRI - patient with PPM  - monitor BP,  - carotid doppler showed some plaque b/l  - echo noted  -meclizine prn vertigo  coumadin d/brandi; Eliquis started by Dr. Weaver    * s/p Viral bronchitis  - completed steroids as an outpatient   - on flonase and Albuterol PRN    *DMT2  - ISS  - continue Lantus 48U qHS  - checked A1c; 6.5    GURVINDER: resolved  check bmp in am    Leukocytosis: ? source  check in am    Low Normal Finger Sticks in am:  decrease lantus to 40 units q hs, monitor very closely      VTE- on Eliquis    poc discussed with pt, her , team. 88/F with history of Afib on Coumadin, PPM, HTN, HLD, admitted with     *positive troponin r/o ACS/ PPM/ Atrial fibrillation- rate controlled  *dizziness likely related to Uncontrolled HTN- patient recently completed Steroid for viral bronchitis  - patient supposed to be on Lasix 30mg daily, continue  - in the ED- SBP>190 received hydralazine IV x1   - continue losartan, Cartia and Metoprolol - home regimen  - Cardiology consult- with Dr Weaver, appreciated   - CT head - unremarkable, repeat in am  - Unable to obtain MRI - patient with PPM  - monitor BP,  - carotid doppler showed some plaque b/l  - echo noted  -meclizine prn vertigo  coumadin d/brandi; Eliquis started by Dr. Weaver but pt refused; restarted back on home dose od 2.5 mg as INT today was 1.94 on 2 mg    * s/p Viral bronchitis  - completed steroids as an outpatient   - on flonase and Albuterol PRN    *DMT2  - ISS  - continue Lantus 48U qHS  - checked A1c; 6.5    GURVINDER: resolved  check bmp in am    Leukocytosis: ? source  check in am    Low Normal Finger Sticks in am:  decrease lantus to 40 units q hs, monitor very closely      VTE- on coumadin    poc discussed with pt, her , team, Dr. Weaver.

## 2019-01-11 LAB
GLUCOSE BLDC GLUCOMTR-MCNC: 132 MG/DL — HIGH (ref 70–99)
GLUCOSE BLDC GLUCOMTR-MCNC: 161 MG/DL — HIGH (ref 70–99)
GLUCOSE BLDC GLUCOMTR-MCNC: 167 MG/DL — HIGH (ref 70–99)
GLUCOSE BLDC GLUCOMTR-MCNC: 82 MG/DL — SIGNIFICANT CHANGE UP (ref 70–99)
INR BLD: 1.83 RATIO — HIGH (ref 0.88–1.16)
PROTHROM AB SERPL-ACNC: 20.7 SEC — HIGH (ref 10–12.9)

## 2019-01-11 RX ADMIN — LOSARTAN POTASSIUM 100 MILLIGRAM(S): 100 TABLET, FILM COATED ORAL at 05:57

## 2019-01-11 RX ADMIN — Medication 650 MILLIGRAM(S): at 01:01

## 2019-01-11 RX ADMIN — INSULIN GLARGINE 40 UNIT(S): 100 INJECTION, SOLUTION SUBCUTANEOUS at 21:43

## 2019-01-11 RX ADMIN — Medication 1 TABLET(S): at 13:17

## 2019-01-11 RX ADMIN — WARFARIN SODIUM 2.5 MILLIGRAM(S): 2.5 TABLET ORAL at 21:42

## 2019-01-11 RX ADMIN — Medication 650 MILLIGRAM(S): at 22:10

## 2019-01-11 RX ADMIN — Medication 50 MILLIGRAM(S): at 05:57

## 2019-01-11 RX ADMIN — Medication 650 MILLIGRAM(S): at 00:31

## 2019-01-11 RX ADMIN — Medication 1: at 13:20

## 2019-01-11 RX ADMIN — Medication 650 MILLIGRAM(S): at 08:27

## 2019-01-11 RX ADMIN — Medication 180 MILLIGRAM(S): at 05:57

## 2019-01-11 RX ADMIN — Medication 650 MILLIGRAM(S): at 21:42

## 2019-01-11 NOTE — PROGRESS NOTE ADULT - ASSESSMENT
88/F with history of Afib on Coumadin, PPM, HTN, HLD, admitted with     *positive troponin r/o ACS/ PPM/ Atrial fibrillation- rate controlled  *dizziness likely related to Uncontrolled HTN- patient recently completed Steroid for viral bronchitis  - patient supposed to be on Lasix 30mg daily, continue  - in the ED- SBP>190 received hydralazine IV x1   - continue losartan, Cartia and Metoprolol - home regimen  - Cardiology consult- with Dr Weaver, appreciated   - CT head - unremarkable, repeat in am  - Unable to obtain MRI - patient with PPM  - monitor BP,  - carotid doppler showed some plaque b/l  - echo noted  -meclizine prn vertigo  coumadin d/brandi; Eliquis started by Dr. Weaver but pt refused; cont home dose of 2.5 mg as INR today was 1.83; check INR in am     * s/p Viral bronchitis  - completed steroids as an outpatient   - on flonase and Albuterol PRN    *DMT2  - ISS  - continue Lantus 48U qHS  - checked A1c; 6.5    GURVINDER: resolved  check bmp in am    Leukocytosis: ? source  check in am    Low Normal Finger Sticks in am:  decrease lantus to 40 units q hs, monitor very closely      VTE- on coumadin    poc discussed with pt, her , team,

## 2019-01-12 LAB
ANION GAP SERPL CALC-SCNC: 7 MMOL/L — SIGNIFICANT CHANGE UP (ref 5–17)
BUN SERPL-MCNC: 31 MG/DL — HIGH (ref 7–23)
CALCIUM SERPL-MCNC: 8.5 MG/DL — SIGNIFICANT CHANGE UP (ref 8.5–10.1)
CHLORIDE SERPL-SCNC: 112 MMOL/L — HIGH (ref 96–108)
CO2 SERPL-SCNC: 27 MMOL/L — SIGNIFICANT CHANGE UP (ref 22–31)
CREAT SERPL-MCNC: 1.08 MG/DL — SIGNIFICANT CHANGE UP (ref 0.5–1.3)
GLUCOSE BLDC GLUCOMTR-MCNC: 126 MG/DL — HIGH (ref 70–99)
GLUCOSE BLDC GLUCOMTR-MCNC: 148 MG/DL — HIGH (ref 70–99)
GLUCOSE BLDC GLUCOMTR-MCNC: 162 MG/DL — HIGH (ref 70–99)
GLUCOSE BLDC GLUCOMTR-MCNC: 81 MG/DL — SIGNIFICANT CHANGE UP (ref 70–99)
GLUCOSE SERPL-MCNC: 84 MG/DL — SIGNIFICANT CHANGE UP (ref 70–99)
HCT VFR BLD CALC: 36.6 % — SIGNIFICANT CHANGE UP (ref 34.5–45)
HGB BLD-MCNC: 11.6 G/DL — SIGNIFICANT CHANGE UP (ref 11.5–15.5)
INR BLD: 1.84 RATIO — HIGH (ref 0.88–1.16)
MCHC RBC-ENTMCNC: 28.3 PG — SIGNIFICANT CHANGE UP (ref 27–34)
MCHC RBC-ENTMCNC: 31.7 GM/DL — LOW (ref 32–36)
MCV RBC AUTO: 89.3 FL — SIGNIFICANT CHANGE UP (ref 80–100)
NRBC # BLD: 0 /100 WBCS — SIGNIFICANT CHANGE UP (ref 0–0)
PLATELET # BLD AUTO: 110 K/UL — LOW (ref 150–400)
POTASSIUM SERPL-MCNC: 4.3 MMOL/L — SIGNIFICANT CHANGE UP (ref 3.5–5.3)
POTASSIUM SERPL-SCNC: 4.3 MMOL/L — SIGNIFICANT CHANGE UP (ref 3.5–5.3)
PROTHROM AB SERPL-ACNC: 20.8 SEC — HIGH (ref 10–12.9)
RBC # BLD: 4.1 M/UL — SIGNIFICANT CHANGE UP (ref 3.8–5.2)
RBC # FLD: 15.8 % — HIGH (ref 10.3–14.5)
SODIUM SERPL-SCNC: 146 MMOL/L — HIGH (ref 135–145)
WBC # BLD: 10.41 K/UL — SIGNIFICANT CHANGE UP (ref 3.8–10.5)
WBC # FLD AUTO: 10.41 K/UL — SIGNIFICANT CHANGE UP (ref 3.8–10.5)

## 2019-01-12 RX ORDER — MECLIZINE HCL 12.5 MG
25 TABLET ORAL EVERY 8 HOURS
Qty: 0 | Refills: 0 | Status: DISCONTINUED | OUTPATIENT
Start: 2019-01-12 | End: 2019-01-14

## 2019-01-12 RX ORDER — WARFARIN SODIUM 2.5 MG/1
3 TABLET ORAL DAILY
Qty: 0 | Refills: 0 | Status: DISCONTINUED | OUTPATIENT
Start: 2019-01-12 | End: 2019-01-13

## 2019-01-12 RX ADMIN — Medication 650 MILLIGRAM(S): at 06:01

## 2019-01-12 RX ADMIN — INSULIN GLARGINE 40 UNIT(S): 100 INJECTION, SOLUTION SUBCUTANEOUS at 21:51

## 2019-01-12 RX ADMIN — Medication 1 TABLET(S): at 12:01

## 2019-01-12 RX ADMIN — Medication 20 MILLIGRAM(S): at 12:00

## 2019-01-12 RX ADMIN — Medication 180 MILLIGRAM(S): at 05:58

## 2019-01-12 RX ADMIN — LOSARTAN POTASSIUM 100 MILLIGRAM(S): 100 TABLET, FILM COATED ORAL at 05:58

## 2019-01-12 RX ADMIN — WARFARIN SODIUM 3 MILLIGRAM(S): 2.5 TABLET ORAL at 21:51

## 2019-01-12 RX ADMIN — Medication 650 MILLIGRAM(S): at 21:56

## 2019-01-12 RX ADMIN — Medication 650 MILLIGRAM(S): at 06:50

## 2019-01-12 RX ADMIN — Medication 50 MILLIGRAM(S): at 05:58

## 2019-01-12 NOTE — PROGRESS NOTE ADULT - ASSESSMENT
Dizziness  has improved  .  Moderate aortic stenosis.  Paroxysmal atrial fibrillation.  Elevated troponin I but no chest pains or shortness of breath.  Sick sinus syndrome status post permanent pacemaker placement in the past.  Morbid obesity.  Suggest  Increase oral fluids.  Continue with Antivert for when necessary basis.  Continue with current cardiac medications.  She has refused Eliquis . Adjust INR 2.0.   She is allergic to aspirin.

## 2019-01-12 NOTE — PROGRESS NOTE ADULT - ASSESSMENT
88/F with history of Afib on Coumadin, PPM, HTN, HLD, admitted with     *positive troponin r/o ACS/ PPM/ Atrial fibrillation- rate controlled  *dizziness likely related to Uncontrolled HTN- patient recently completed Steroid for viral bronchitis  - patient supposed to be on Lasix 30mg daily, continue  - in the ED- SBP>190 received hydralazine IV x1   - continue losartan, Cartia and Metoprolol - home regimen  - Cardiology consult- with Dr Weaver, jimy   - CT head - unremarkable, repeat in am  - Unable to obtain MRI - patient with PPM  - monitor BP,  - carotid doppler showed some plaque b/l  - echo noted  -increase meclizine to 25 mg prn vertigo  coumadin d/brandi; Eliquis started by Dr. Weaver but pt refused;  INR today was 1.84; check INR in am increase coumadin to 3 mg 1/12    * s/p Viral bronchitis  - completed steroids as an outpatient   - on flonase and Albuterol PRN    *DMT2  - ISS  - continue Lantus 48U qHS  - checked A1c; 6.5    GURVINDER: resolved  check bmp in am    Leukocytosis: ? source  check in am    Low Normal Finger Sticks in am:  decreased lantus to 40 units q hs, monitor very closely      VTE- on coumadin    poc discussed with pt, \team,

## 2019-01-13 LAB
GLUCOSE BLDC GLUCOMTR-MCNC: 118 MG/DL — HIGH (ref 70–99)
GLUCOSE BLDC GLUCOMTR-MCNC: 142 MG/DL — HIGH (ref 70–99)
GLUCOSE BLDC GLUCOMTR-MCNC: 146 MG/DL — HIGH (ref 70–99)
GLUCOSE BLDC GLUCOMTR-MCNC: 193 MG/DL — HIGH (ref 70–99)
INR BLD: 1.76 RATIO — HIGH (ref 0.88–1.16)
PROTHROM AB SERPL-ACNC: 19.9 SEC — HIGH (ref 10–12.9)

## 2019-01-13 RX ORDER — WARFARIN SODIUM 2.5 MG/1
4 TABLET ORAL DAILY
Qty: 0 | Refills: 0 | Status: DISCONTINUED | OUTPATIENT
Start: 2019-01-13 | End: 2019-01-14

## 2019-01-13 RX ADMIN — Medication 50 MILLIGRAM(S): at 05:36

## 2019-01-13 RX ADMIN — Medication 180 MILLIGRAM(S): at 05:36

## 2019-01-13 RX ADMIN — WARFARIN SODIUM 4 MILLIGRAM(S): 2.5 TABLET ORAL at 21:18

## 2019-01-13 RX ADMIN — Medication 1 TABLET(S): at 11:45

## 2019-01-13 RX ADMIN — LOSARTAN POTASSIUM 100 MILLIGRAM(S): 100 TABLET, FILM COATED ORAL at 05:36

## 2019-01-13 RX ADMIN — Medication 25 MILLIGRAM(S): at 07:12

## 2019-01-13 RX ADMIN — INSULIN GLARGINE 40 UNIT(S): 100 INJECTION, SOLUTION SUBCUTANEOUS at 21:19

## 2019-01-13 NOTE — PROGRESS NOTE ADULT - ASSESSMENT
88/F with history of Afib on Coumadin, PPM, HTN, HLD, admitted with     *positive troponin r/o ACS/ PPM/ Atrial fibrillation- rate controlled  *dizziness likely related to Uncontrolled HTN- patient recently completed Steroid for viral bronchitis  - patient supposed to be on Lasix 30mg daily, continue  - in the ED- SBP>190 received hydralazine IV x1   - continue losartan, Cartia and Metoprolol - home regimen  - Cardiology consult- with Dr Weaver, jimy   - CT head - unremarkable, repeat in am, no change  - Unable to obtain MRI - patient with PPM  - monitor BP,  - carotid doppler showed some plaque b/l  - echo noted  -increase meclizine to 25 mg prn vertigo  coumadin d/brandi; Eliquis started by Dr. Weaver but pt refused;  INR today was 1.76; check INR in am increase coumadin to 4 mg 1/13    * s/p Viral bronchitis  - completed steroids as an outpatient   - on flonase and Albuterol PRN    *DMT2  - ISS  - continue Lantus 48U qHS  - checked A1c; 6.5    GURVINDER: resolved    Leukocytosis: ? source; resolved    Low Normal Finger Sticks in am:  decreased lantus to 40 units q hs, monitor very closely      VTE- on coumadin    poc discussed with pt, team,

## 2019-01-13 NOTE — PROGRESS NOTE ADULT - SUBJECTIVE AND OBJECTIVE BOX
HPI: 88 year old woman with history of Afib on Coumadin, PPM, HTN, HLD presented to the ED with 2 days history of dizziness + nausea, no vomiting. She was recently treated with medrol dose pack for viral bronchitis (completed on ). Patient stated that she feels like the room is spinning even when she closes her eyes. Patient received antivert in the ED and stated that it helped but she still feels dizzy when she moves or changes position. In the ED, Hypertensive with SBP>190- received hydralazine IV and po antihypertensives restarted. +troponin.     : spinning of room better now  able to go to bathroom    1/10: vertigo still present  repeat CT head normal  Am FS of 81, 82      PHYSICAL EXAM:    Daily     Daily     ICU Vital Signs Last 24 Hrs  T(C): 36.7 (2019 10:44), Max: 36.7 (2019 21:07)  T(F): 98 (2019 10:44), Max: 98.1 (2019 05:38)  HR: 64 (2019 10:44) (64 - 76)  BP: 162/53 (2019 10:44) (134/44 - 162/53)  BP(mean): --  ABP: --  ABP(mean): --  RR: 18 (2019 10:44) (18 - 19)  SpO2: 95% (2019 10:44) (91% - 95%)      Constitutional: Well appearing  HEENT: Atraumatic, FLOYD, Normal, No congestion  Respiratory: Breath Sounds normal, no rhonchi/wheeze  Cardiovascular: N S1S2;   Gastrointestinal: Abdomen soft, non tender, Bowel Sounds present  Extremities: No edema, peripheral pulses present  Neurological: AAO x 3, no gross focal motor deficits  Skin: Non cellulitic, no rash, ulcers  Lymph Nodes: No lymphadenopathy noted  Back: No CVA tenderness   Musculoskeletal: non tender  Breasts: Deferred  Genitourinary: deferred  Rectal: Deferred                 Lab Results:  CBC  CBC Full  -  ( 10 Estuardo 2019 05:58 )  WBC Count : 13.09 K/uL  Hemoglobin : 11.8 g/dL  Hematocrit : 38.1 %  Platelet Count - Automated : 109 K/uL  Mean Cell Volume : 88.6 fl  Mean Cell Hemoglobin : 27.4 pg  Mean Cell Hemoglobin Concentration : 31.0 gm/dL  Auto Neutrophil # : x  Auto Lymphocyte # : x  Auto Monocyte # : x  Auto Eosinophil # : x  Auto Basophil # : x  Auto Neutrophil % : x  Auto Lymphocyte % : x  Auto Monocyte % : x  Auto Eosinophil % : x  Auto Basophil % : x    .		Differential:	[] Automated		[] Manual  Chemistry                        11.8   13.09 )-----------( 109      ( 10 Estuardo 2019 05:58 )             38.1     01-10    142  |  110<H>  |  36<H>  ----------------------------<  63<L>  4.0   |  27  |  1.29    Ca    8.3<L>      10 Estuardo 2019 05:58        PT/INR - ( 10 Estuardo 2019 05:58 )   PT: 22.0 sec;   INR: 1.94 ratio         PTT - ( 10 Estuardo 2019 05:58 )  PTT:29.3 sec  Urinalysis Basic - ( 2019 11:15 )    Color: Yellow / Appearance: Clear / S.020 / pH: x  Gluc: x / Ketone: Negative  / Bili: Negative / Urobili: Negative mg/dL   Blood: x / Protein: Negative mg/dL / Nitrite: Negative   Leuk Esterase: Negative / RBC: x / WBC x   Sq Epi: x / Non Sq Epi: x / Bacteria: x            MICROBIOLOGY/CULTURES:      RADIOLOGY RESULTS:          MEDICATIONS  (STANDING):  dextrose 5%. 1000 milliLiter(s) (50 mL/Hr) IV Continuous <Continuous>  dextrose 50% Injectable 12.5 Gram(s) IV Push once  diltiazem    milliGRAM(s) Oral daily  docusate sodium 100 milliGRAM(s) Oral three times a day  fluticasone propionate 50 MICROgram(s)/spray Nasal Spray 1 Spray(s) Both Nostrils <User Schedule>  furosemide    Tablet 20 milliGRAM(s) Oral daily  insulin glargine Injectable (LANTUS) 48 Unit(s) SubCutaneous at bedtime  insulin lispro (HumaLOG) corrective regimen sliding scale   SubCutaneous three times a day before meals  losartan 100 milliGRAM(s) Oral daily  metoprolol succinate ER 50 milliGRAM(s) Oral daily  multivitamin 1 Tablet(s) Oral daily  warfarin 2 milliGRAM(s) Oral once
HPI: 88 year old woman with history of Afib on Coumadin, PPM, HTN, HLD presented to the ED with 2 days history of dizziness + nausea, no vomiting. She was recently treated with medrol dose pack for viral bronchitis (completed on Sunday). Patient stated that she feels like the room is spinning even when she closes her eyes. Patient received antivert in the ED and stated that it helped but she still feels dizzy when she moves or changes position. In the ED, Hypertensive with SBP>190- received hydralazine IV and po antihypertensives restarted. +troponin.     1/9: spinning of room better now  able to go to bathroom    1/10: vertigo still present  repeat CT head normal  Am FS of 81, 82    1/11: vertigo much better today  FS 82 in am  INR 1.83    1/12: episode of severe dizziness today morning, on meclizine 12.5  INR still 1.84 only    PHYSICAL EXAM:    Daily     Daily     ICU Vital Signs Last 24 Hrs  T(C): 36.7 (09 Jan 2019 10:44), Max: 36.7 (08 Jan 2019 21:07)  T(F): 98 (09 Jan 2019 10:44), Max: 98.1 (09 Jan 2019 05:38)  HR: 64 (09 Jan 2019 10:44) (64 - 76)  BP: 162/53 (09 Jan 2019 10:44) (134/44 - 162/53)  BP(mean): --  ABP: --  ABP(mean): --  RR: 18 (09 Jan 2019 10:44) (18 - 19)  SpO2: 95% (09 Jan 2019 10:44) (91% - 95%)      Constitutional: Well appearing  HEENT: Atraumatic, FLOYD, Normal, No congestion  Respiratory: Breath Sounds normal, no rhonchi/wheeze  Cardiovascular: N S1S2;   Gastrointestinal: Abdomen soft, non tender, Bowel Sounds present  Extremities: No edema, peripheral pulses present  Neurological: AAO x 3, no gross focal motor deficits  Skin: Non cellulitic, no rash, ulcers  Lymph Nodes: No lymphadenopathy noted  Back: No CVA tenderness   Musculoskeletal: non tender  Breasts: Deferred  Genitourinary: deferred  Rectal: Deferred    Lab Results:  CBC  CBC Full  -  ( 10 Estuardo 2019 05:58 )  WBC Count : 13.09 K/uL  Hemoglobin : 11.8 g/dL  Hematocrit : 38.1 %  Platelet Count - Automated : 109 K/uL  Mean Cell Volume : 88.6 fl  Mean Cell Hemoglobin : 27.4 pg  Mean Cell Hemoglobin Concentration : 31.0 gm/dL  Auto Neutrophil # : x  Auto Lymphocyte # : x  Auto Monocyte # : x  Auto Eosinophil # : x  Auto Basophil # : x  Auto Neutrophil % : x  Auto Lymphocyte % : x  Auto Monocyte % : x  Auto Eosinophil % : x  Auto Basophil % : x    .		Differential:	[] Automated		[] Manual  Chemistry                        11.8   13.09 )-----------( 109      ( 10 Estuardo 2019 05:58 )             38.1     01-10    142  |  110<H>  |  36<H>  ----------------------------<  63<L>  4.0   |  27  |  1.29    Ca    8.3<L>      10 Estuardo 2019 05:58        PT/INR - ( 11 Jan 2019 06:16 )   PT: 20.7 sec;   INR: 1.83 ratio         PTT - ( 10 Estuardo 2019 05:58 )  PTT:29.3 sec          MICROBIOLOGY/CULTURES:      RADIOLOGY RESULTS:            MEDICATIONS  (STANDING):  dextrose 5%. 1000 milliLiter(s) (50 mL/Hr) IV Continuous <Continuous>  dextrose 50% Injectable 12.5 Gram(s) IV Push once  diltiazem    milliGRAM(s) Oral daily  docusate sodium 100 milliGRAM(s) Oral three times a day  fluticasone propionate 50 MICROgram(s)/spray Nasal Spray 1 Spray(s) Both Nostrils <User Schedule>  furosemide    Tablet 20 milliGRAM(s) Oral daily  insulin glargine Injectable (LANTUS) 48 Unit(s) SubCutaneous at bedtime  insulin lispro (HumaLOG) corrective regimen sliding scale   SubCutaneous three times a day before meals  losartan 100 milliGRAM(s) Oral daily  metoprolol succinate ER 50 milliGRAM(s) Oral daily  multivitamin 1 Tablet(s) Oral daily  warfarin 2 milliGRAM(s) Oral once
HPI:  Patient is a 88-year-old she has a history of hypertension, type II diabetes mellitus, paroxysmal atrial fibrillation status post permanent pacemaker placement in 2009, morbid obesity, status post cardiac catheterization in 2009 she had normal coronaries, moderate aortic stenosis, she was admitted with complains of dizziness, this dizziness is persistent and gets worse when she moves her head. She denies any headache or any blurred vision. At times she feels nauseous. No vomiting. She denies any chest pain. Dyspnea with moderate exertion but overall exercise tolerance has been stable. On admission her troponin I was noted a mildly elevated. But patient has no chest pain or shortness of breath. She continues to be normal sinus rhythm on telemetry with atrial pacing. Today she feels better, but still has dizziness when she moves her head. She denies any headache or any blurred vision.       Transthoracic Echocardiogram (19    Summary     Fibrocalcific changes noted to the mitral valve leaflets with minimally   restricted leaflet excursion.   Trace to mild mitral regurgitation is present.   EA reversal of the mitral inflow consistent with reduced compliance of   the   left ventricle.   Significant fibrocalcific changes noted to the aortic valve leaflets with   restriction in leaflet excursion. Peak transaortic gradient is 40 mmHg;   this finding is consistent with mild aortic stenosis.   Mild to Moderate Tricuspid regurgitation is present.   Pulmonic valve not well seen, probably normal pulmonic valve function.   The left atrium is mildly dilated.   Mild concentric left ventricular hypertrophy is present.   Estimated left ventricular ejection fraction is 60-65 %.   Normal appearing right atrium.   A device wire is seen in the RV and RA.   No evidence of pericardial effusion.   No evidence of pleural effusion.          PAST MEDICAL & SURGICAL HISTORY:  Type 2 diabetes mellitus with complication, with long-term current use of insulin  Paroxysmal atrial fibrillation  HTN (hypertension)  Artificial cardiac pacemaker  History of appendectomy  H/O: hysterectomy          MEDICATIONS  (STANDING):  dextrose 5%. 1000 milliLiter(s) (50 mL/Hr) IV Continuous <Continuous>  dextrose 50% Injectable 12.5 Gram(s) IV Push once  diltiazem    milliGRAM(s) Oral daily  docusate sodium 100 milliGRAM(s) Oral three times a day  fluticasone propionate 50 MICROgram(s)/spray Nasal Spray 1 Spray(s) Both Nostrils <User Schedule>  furosemide    Tablet 20 milliGRAM(s) Oral daily  insulin glargine Injectable (LANTUS) 48 Unit(s) SubCutaneous at bedtime  insulin lispro (HumaLOG) corrective regimen sliding scale   SubCutaneous three times a day before meals  losartan 100 milliGRAM(s) Oral daily  metoprolol succinate ER 50 milliGRAM(s) Oral daily  multivitamin 1 Tablet(s) Oral daily    MEDICATIONS  (PRN):  acetaminophen   Tablet .. 650 milliGRAM(s) Oral every 6 hours PRN Temp greater or equal to 38C (100.4F), Mild Pain (1 - 3)  ALBUTerol    0.083% 2.5 milliGRAM(s) Nebulizer every 6 hours PRN Bronchospasm  dextrose 40% Gel 15 Gram(s) Oral once PRN Blood Glucose LESS THAN 70 milliGRAM(s)/deciliter  diazepam    Tablet 2 milliGRAM(s) Oral every 8 hours PRN vertigo  glucagon  Injectable 1 milliGRAM(s) IntraMuscular once PRN Glucose LESS THAN 70 milligrams/deciliter  senna 2 Tablet(s) Oral at bedtime PRN Constipation          REVIEW OF SYSTEM:  Pertinent items are noted in HPI.  Constitutional	Negative for chills, fevers, sweats.    Eyes: 	Negative for visual disturbance.  Ears, nose, mouth, throat, and face: Negative for epistaxis, nasal congestion, sore throat and tinnitus.  Neck:	Negative for enlargement, pain and difficulty in swallowing  Respiration : Negative for cough, dyspnea on exertion, pleuritic chest pain and wheezing  Cardiovascular: Negative for chest pain, dyspnea and palpitations    Gastrointestinal : Negative for abdominal pain, diarrhea, nausea and vomiting  Genitourinary: Negative for dysuria, frequency and urinary incontinence .  Skin: Negative for  rash, pruritus, swelling, dryness .  	  Hematologic/lymphatic: Negative for bleeding and easy bruising  Musculoskeletal: Negative for arthralgias, back pain and muscle weakness.  Neurological: Negative for headaches, seizures and tremors  Behavioral/Psych: Negative for mood change, depression.  Endocrine:	Negative for blurry vision, polydipsia and polyuria, diaphoresis.   Allergic/Immunologic:	Negative for anaphylaxis, angioedema and urticaria.      Vital Signs Last 24 Hrs  T(C): 36.7 (10 Estuardo 2019 04:15), Max: 36.7 (2019 10:44)  T(F): 98.1 (10 Estuardo 2019 04:15), Max: 98.1 (10 Estuardo 2019 04:15)  HR: 66 (10 Etsuardo 2019 04:15) (64 - 72)  BP: 155/55 (10 Estuardo 2019 04:15) (155/55 - 162/53)  BP(mean): --  RR: 18 (2019 10:44) (18 - 18)  SpO2: 99% (10 Estuardo 2019 04:15) (93% - 99%)    I&O's Summary    PHYSICAL EXAM  General Appearance: cooperative, no acute distress,   HEENT: PERRL, conjunctiva clear, EOM's intact,  Neck: Supple, , no adenopathy, thyroid: not enlarged, no carotid bruit or JVD  Back: Symmetric, no  tenderness,no soft tissue tenderness  Lungs: Clear to auscultation bilateral,no adventitious breath sounds, normal   expiratory phase  Heart: Regular rate and rhythm, S1, S2 normal, 2/6 ESM murmur, no  rub or gallop  Abdomen: Soft, non-tender, bowel sounds active , no hepatosplenomegaly  Extremities: no cyanosis or edema, no joint swelling  Skin: Skin color, texture normal, no rashes   Neurologic: Alert and oriented X3 , cranial nerves intact, sensory and motor normal,        INTERPRETATION OF TELEMETRY: NSR with Electronic atrial pacing.            LABS:                          11.8   13.09 )-----------( 109      ( 10 Estuardo 2019 05:58 )             38.1     01-10    142  |  110<H>  |  36<H>  ----------------------------<  63<L>  4.0   |  27  |  1.29    Ca    8.3<L>      10 Estuardo 2019 05:58      CARDIAC MARKERS ( 2019 16:31 )  0.130 ng/mL / x     / x     / x     / x              PT/INR - ( 10 Estuardo 2019 05:58 )   PT: 22.0 sec;   INR: 1.94 ratio         PTT - ( 10 Estuardo 2019 05:58 )  PTT:29.3 sec  Urinalysis Basic - ( 2019 11:15 )    Color: Yellow / Appearance: Clear / S.020 / pH: x  Gluc: x / Ketone: Negative  / Bili: Negative / Urobili: Negative mg/dL   Blood: x / Protein: Negative mg/dL / Nitrite: Negative   Leuk Esterase: Negative / RBC: x / WBC x   Sq Epi: x / Non Sq Epi: x / Bacteria: x            RADIOLOGY & ADDITIONAL STUDIES:
HPI:  Patient is a 88-year-old she has a history of hypertension, type II diabetes mellitus, paroxysmal atrial fibrillation status post permanent pacemaker placement in January 2009, morbid obesity, status post cardiac catheterization in June 2009 she had normal coronaries, moderate aortic stenosis, she was admitted with complains of dizziness, this dizziness is persistent and gets worse when she moves her head. She denies any headache or any blurred vision. At times she feels nauseous. No vomiting. She denies any chest pain. Dyspnea with moderate exertion but overall exercise tolerance has been stable. On admission her troponin I was noted a mildly elevated. But patient has no chest pain or shortness of breath. She has refused any invasive intervention at this time .  She continues to be normal sinus rhythm on telemetry with atrial pacing. Today she feels better . She denies any headache or any blurred vision.       Transthoracic Echocardiogram (01.09.19    Summary     Fibrocalcific changes noted to the mitral valve leaflets with minimally   restricted leaflet excursion.   Trace to mild mitral regurgitation is present.   EA reversal of the mitral inflow consistent with reduced compliance of   the   left ventricle.   Significant fibrocalcific changes noted to the aortic valve leaflets with   restriction in leaflet excursion. Peak transaortic gradient is 40 mmHg;   this finding is consistent with mild aortic stenosis.   Mild to Moderate Tricuspid regurgitation is present.   Pulmonic valve not well seen, probably normal pulmonic valve function.   The left atrium is mildly dilated.   Mild concentric left ventricular hypertrophy is present.   Estimated left ventricular ejection fraction is 60-65 %.   Normal appearing right atrium.   A device wire is seen in the RV and RA.   No evidence of pericardial effusion.   No evidence of pleural effusion.    PAST MEDICAL & SURGICAL HISTORY:  Type 2 diabetes mellitus with complication, with long-term current use of insulin  Paroxysmal atrial fibrillation  HTN (hypertension)  Artificial cardiac pacemaker  History of appendectomy  H/O: hysterectomy          MEDICATIONS  (STANDING):  dextrose 5%. 1000 milliLiter(s) (50 mL/Hr) IV Continuous <Continuous>  dextrose 50% Injectable 12.5 Gram(s) IV Push once  diltiazem    milliGRAM(s) Oral daily  docusate sodium 100 milliGRAM(s) Oral three times a day  fluticasone propionate 50 MICROgram(s)/spray Nasal Spray 1 Spray(s) Both Nostrils <User Schedule>  furosemide    Tablet 20 milliGRAM(s) Oral daily  insulin glargine Injectable (LANTUS) 40 Unit(s) SubCutaneous at bedtime  insulin lispro (HumaLOG) corrective regimen sliding scale   SubCutaneous three times a day before meals  losartan 100 milliGRAM(s) Oral daily  metoprolol succinate ER 50 milliGRAM(s) Oral daily  multivitamin 1 Tablet(s) Oral daily  warfarin 3 milliGRAM(s) Oral daily    MEDICATIONS  (PRN):  acetaminophen   Tablet .. 650 milliGRAM(s) Oral every 6 hours PRN Temp greater or equal to 38C (100.4F), Mild Pain (1 - 3)  ALBUTerol    0.083% 2.5 milliGRAM(s) Nebulizer every 6 hours PRN Bronchospasm  dextrose 40% Gel 15 Gram(s) Oral once PRN Blood Glucose LESS THAN 70 milliGRAM(s)/deciliter  glucagon  Injectable 1 milliGRAM(s) IntraMuscular once PRN Glucose LESS THAN 70 milligrams/deciliter  meclizine 12.5 milliGRAM(s) Oral three times a day PRN Dizziness  senna 2 Tablet(s) Oral at bedtime PRN Constipation          REVIEW OF SYSTEM:  Pertinent items are noted in HPI.  Constitutional	Negative for chills, fevers, sweats.    Eyes: 	Negative for visual disturbance.  Ears, nose, mouth, throat, and face: Negative for epistaxis, nasal congestion, sore throat and tinnitus.  Neck:	Negative for enlargement, pain and difficulty in swallowing  Respiration : Negative for cough, dyspnea on exertion, pleuritic chest pain and wheezing  Cardiovascular: Negative for chest pain, dyspnea and palpitations    Gastrointestinal : Negative for abdominal pain, diarrhea, nausea and vomiting  Genitourinary: Negative for dysuria, frequency and urinary incontinence .  Skin: Negative for  rash, pruritus, swelling, dryness .  	  Hematologic/lymphatic: Negative for bleeding and easy bruising  Musculoskeletal: Negative for arthralgias, back pain and muscle weakness.  Neurological: Negative for  headaches, seizures and tremors . Dizziness has improved   Behavioral/Psych: Negative for mood change, depression.  Endocrine:	Negative for blurry vision, polydipsia and polyuria, diaphoresis.   Allergic/Immunologic:	Negative for anaphylaxis, angioedema and urticaria.      Vital Signs Last 24 Hrs  T(C): 36.7 (12 Jan 2019 09:58), Max: 36.7 (12 Jan 2019 09:58)  T(F): 98.1 (12 Jan 2019 09:58), Max: 98.1 (12 Jan 2019 09:58)  HR: 60 (12 Jan 2019 09:58) (59 - 66)  BP: 150/40 (12 Jan 2019 09:58) (145/60 - 150/40)  BP(mean): --  RR: 18 (12 Jan 2019 09:58) (16 - 18)  SpO2: 100% (12 Jan 2019 09:58) (87% - 100%)    I&O's Summary    PHYSICAL EXAM  General Appearance: cooperative, no acute distress,   HEENT: PERRL, conjunctiva clear, EOM's intact .  Neck: Supple, , no adenopathy, thyroid: not enlarged, no carotid bruit     Lungs: Clear to auscultation bilateral,no adventitious breath sounds, normal   expiratory phase  Heart: Regular rate and rhythm, S1, S2 normal, 2/6 ESM murmur, no  rub or gallop  Abdomen: Soft, non-tender, bowel sounds active , no hepatosplenomegaly  Extremities: no cyanosis or edema, no joint swelling  Skin: Skin color, texture normal, no rashes   Neurologic: Alert and oriented X3 , cranial nerves intact, sensory and motor normal,        INTERPRETATION OF TELEMETRY: Atrial pacing            LABS:                          11.6   10.41 )-----------( 110      ( 12 Jan 2019 05:44 )             36.6     01-12    146<H>  |  112<H>  |  31<H>  ----------------------------<  84  4.3   |  27  |  1.08    Ca    8.5      12 Jan 2019 05:44              PT/INR - ( 12 Jan 2019 05:44 )   PT: 20.8 sec;   INR: 1.84 ratio                   RADIOLOGY & ADDITIONAL STUDIES:
HPI: 88 year old woman with history of Afib on Coumadin, PPM, HTN, HLD presented to the ED with 2 days history of dizziness + nausea, no vomiting. She was recently treated with medrol dose pack for viral bronchitis (completed on ). Patient stated that she feels like the room is spinning even when she closes her eyes. Patient received antivert in the ED and stated that it helped but she still feels dizzy when she moves or changes position. In the ED, Hypertensive with SBP>190- received hydralazine IV and po antihypertensives restarted. +troponin.     : spinning of room better now  able to go to bathroom      PHYSICAL EXAM:    Daily     Daily     ICU Vital Signs Last 24 Hrs  T(C): 36.7 (2019 10:44), Max: 36.7 (2019 21:07)  T(F): 98 (2019 10:44), Max: 98.1 (2019 05:38)  HR: 64 (2019 10:44) (64 - 76)  BP: 162/53 (2019 10:44) (134/44 - 162/53)  BP(mean): --  ABP: --  ABP(mean): --  RR: 18 (2019 10:44) (18 - 19)  SpO2: 95% (2019 10:44) (91% - 95%)      Constitutional: Well appearing  HEENT: Atraumatic, FLOYD, Normal, No congestion  Respiratory: Breath Sounds normal, no rhonchi/wheeze  Cardiovascular: N S1S2;   Gastrointestinal: Abdomen soft, non tender, Bowel Sounds present  Extremities: No edema, peripheral pulses present  Neurological: AAO x 3, no gross focal motor deficits  Skin: Non cellulitic, no rash, ulcers  Lymph Nodes: No lymphadenopathy noted  Back: No CVA tenderness   Musculoskeletal: non tender  Breasts: Deferred  Genitourinary: deferred  Rectal: Deferred                          13.1   14.35 )-----------( 155      ( 2019 12:04 )             41.3       CBC Full  -  ( 2019 12:04 )  WBC Count : 14.35 K/uL  Hemoglobin : 13.1 g/dL  Hematocrit : 41.3 %  Platelet Count - Automated : 155 K/uL  Mean Cell Volume : 87.5 fl  Mean Cell Hemoglobin : 27.8 pg  Mean Cell Hemoglobin Concentration : 31.7 gm/dL  Auto Neutrophil # : x  Auto Lymphocyte # : x  Auto Monocyte # : x  Auto Eosinophil # : x  Auto Basophil # : x  Auto Neutrophil % : x  Auto Lymphocyte % : x  Auto Monocyte % : x  Auto Eosinophil % : x  Auto Basophil % : x          142  |  109<H>  |  36<H>  ----------------------------<  63<L>  3.9   |  28  |  1.24    Ca    8.3<L>      2019 05:27    TPro  6.9  /  Alb  3.2<L>  /  TBili  0.4  /  DBili  x   /  AST  33  /  ALT  31  /  AlkPhos  71  0108      LIVER FUNCTIONS - ( 2019 00:19 )  Alb: 3.2 g/dL / Pro: 6.9 gm/dL / ALK PHOS: 71 U/L / ALT: 31 U/L / AST: 33 U/L / GGT: x             PT/INR - ( 2019 05:27 )   PT: 28.9 sec;   INR: 2.53 ratio         PTT - ( 2019 05:27 )  PTT:35.3 sec    CARDIAC MARKERS ( 2019 16:31 )  0.130 ng/mL / x     / x     / x     / x      CARDIAC MARKERS ( 2019 07:35 )  0.128 ng/mL / x     / x     / x     / x      CARDIAC MARKERS ( 2019 00:19 )  0.148 ng/mL / x     / x     / x     / x            Urinalysis Basic - ( 2019 11:15 )    Color: Yellow / Appearance: Clear / S.020 / pH: x  Gluc: x / Ketone: Negative  / Bili: Negative / Urobili: Negative mg/dL   Blood: x / Protein: Negative mg/dL / Nitrite: Negative   Leuk Esterase: Negative / RBC: x / WBC x   Sq Epi: x / Non Sq Epi: x / Bacteria: x            MEDICATIONS  (STANDING):  dextrose 5%. 1000 milliLiter(s) (50 mL/Hr) IV Continuous <Continuous>  dextrose 50% Injectable 12.5 Gram(s) IV Push once  diltiazem    milliGRAM(s) Oral daily  docusate sodium 100 milliGRAM(s) Oral three times a day  fluticasone propionate 50 MICROgram(s)/spray Nasal Spray 1 Spray(s) Both Nostrils <User Schedule>  furosemide    Tablet 20 milliGRAM(s) Oral daily  insulin glargine Injectable (LANTUS) 48 Unit(s) SubCutaneous at bedtime  insulin lispro (HumaLOG) corrective regimen sliding scale   SubCutaneous three times a day before meals  losartan 100 milliGRAM(s) Oral daily  metoprolol succinate ER 50 milliGRAM(s) Oral daily  multivitamin 1 Tablet(s) Oral daily  warfarin 2 milliGRAM(s) Oral once
HPI: 88 year old woman with history of Afib on Coumadin, PPM, HTN, HLD presented to the ED with 2 days history of dizziness + nausea, no vomiting. She was recently treated with medrol dose pack for viral bronchitis (completed on Sunday). Patient stated that she feels like the room is spinning even when she closes her eyes. Patient received antivert in the ED and stated that it helped but she still feels dizzy when she moves or changes position. In the ED, Hypertensive with SBP>190- received hydralazine IV and po antihypertensives restarted. +troponin.     1/9: spinning of room better now  able to go to bathroom    1/10: vertigo still present  repeat CT head normal  Am FS of 81, 82    1/11: vertigo much better today  FS 82 in am  INR 1.83      PHYSICAL EXAM:    Daily     Daily     ICU Vital Signs Last 24 Hrs  T(C): 36.7 (09 Jan 2019 10:44), Max: 36.7 (08 Jan 2019 21:07)  T(F): 98 (09 Jan 2019 10:44), Max: 98.1 (09 Jan 2019 05:38)  HR: 64 (09 Jan 2019 10:44) (64 - 76)  BP: 162/53 (09 Jan 2019 10:44) (134/44 - 162/53)  BP(mean): --  ABP: --  ABP(mean): --  RR: 18 (09 Jan 2019 10:44) (18 - 19)  SpO2: 95% (09 Jan 2019 10:44) (91% - 95%)      Constitutional: Well appearing  HEENT: Atraumatic, FLOYD, Normal, No congestion  Respiratory: Breath Sounds normal, no rhonchi/wheeze  Cardiovascular: N S1S2;   Gastrointestinal: Abdomen soft, non tender, Bowel Sounds present  Extremities: No edema, peripheral pulses present  Neurological: AAO x 3, no gross focal motor deficits  Skin: Non cellulitic, no rash, ulcers  Lymph Nodes: No lymphadenopathy noted  Back: No CVA tenderness   Musculoskeletal: non tender  Breasts: Deferred  Genitourinary: deferred  Rectal: Deferred    Lab Results:  CBC  CBC Full  -  ( 10 Estuardo 2019 05:58 )  WBC Count : 13.09 K/uL  Hemoglobin : 11.8 g/dL  Hematocrit : 38.1 %  Platelet Count - Automated : 109 K/uL  Mean Cell Volume : 88.6 fl  Mean Cell Hemoglobin : 27.4 pg  Mean Cell Hemoglobin Concentration : 31.0 gm/dL  Auto Neutrophil # : x  Auto Lymphocyte # : x  Auto Monocyte # : x  Auto Eosinophil # : x  Auto Basophil # : x  Auto Neutrophil % : x  Auto Lymphocyte % : x  Auto Monocyte % : x  Auto Eosinophil % : x  Auto Basophil % : x    .		Differential:	[] Automated		[] Manual  Chemistry                        11.8   13.09 )-----------( 109      ( 10 Estuardo 2019 05:58 )             38.1     01-10    142  |  110<H>  |  36<H>  ----------------------------<  63<L>  4.0   |  27  |  1.29    Ca    8.3<L>      10 Estuardo 2019 05:58        PT/INR - ( 11 Jan 2019 06:16 )   PT: 20.7 sec;   INR: 1.83 ratio         PTT - ( 10 Estuardo 2019 05:58 )  PTT:29.3 sec          MICROBIOLOGY/CULTURES:      RADIOLOGY RESULTS:            MEDICATIONS  (STANDING):  dextrose 5%. 1000 milliLiter(s) (50 mL/Hr) IV Continuous <Continuous>  dextrose 50% Injectable 12.5 Gram(s) IV Push once  diltiazem    milliGRAM(s) Oral daily  docusate sodium 100 milliGRAM(s) Oral three times a day  fluticasone propionate 50 MICROgram(s)/spray Nasal Spray 1 Spray(s) Both Nostrils <User Schedule>  furosemide    Tablet 20 milliGRAM(s) Oral daily  insulin glargine Injectable (LANTUS) 48 Unit(s) SubCutaneous at bedtime  insulin lispro (HumaLOG) corrective regimen sliding scale   SubCutaneous three times a day before meals  losartan 100 milliGRAM(s) Oral daily  metoprolol succinate ER 50 milliGRAM(s) Oral daily  multivitamin 1 Tablet(s) Oral daily  warfarin 2 milliGRAM(s) Oral once
HPI: 88 year old woman with history of Afib on Coumadin, PPM, HTN, HLD presented to the ED with 2 days history of dizziness + nausea, no vomiting. She was recently treated with medrol dose pack for viral bronchitis (completed on Sunday). Patient stated that she feels like the room is spinning even when she closes her eyes. Patient received antivert in the ED and stated that it helped but she still feels dizzy when she moves or changes position. In the ED, Hypertensive with SBP>190- received hydralazine IV and po antihypertensives restarted. +troponin.     1/9: spinning of room better now  able to go to bathroom    1/10: vertigo still present  repeat CT head normal  Am FS of 81, 82    1/11: vertigo much better today  FS 82 in am  INR 1.83    1/12: episode of severe dizziness today morning, on meclizine 12.5  INR still 1.84 only    1/13: INR dropped to 1.76 on coumadin 3 mg ( home dose 2.5) . She ate mediterranean salad yesterday  Less dizzy today on meclizine 25 mg    PHYSICAL EXAM:    Daily     Daily     ICU Vital Signs Last 24 Hrs  T(C): 36.7 (09 Jan 2019 10:44), Max: 36.7 (08 Jan 2019 21:07)  T(F): 98 (09 Jan 2019 10:44), Max: 98.1 (09 Jan 2019 05:38)  HR: 64 (09 Jan 2019 10:44) (64 - 76)  BP: 162/53 (09 Jan 2019 10:44) (134/44 - 162/53)  BP(mean): --  ABP: --  ABP(mean): --  RR: 18 (09 Jan 2019 10:44) (18 - 19)  SpO2: 95% (09 Jan 2019 10:44) (91% - 95%)      Constitutional: Well appearing  HEENT: Atraumatic, FLOYD, Normal, No congestion  Respiratory: Breath Sounds normal, no rhonchi/wheeze  Cardiovascular: N S1S2;   Gastrointestinal: Abdomen soft, non tender, Bowel Sounds present  Extremities: No edema, peripheral pulses present  Neurological: AAO x 3, no gross focal motor deficits  Skin: Non cellulitic, no rash, ulcers  Lymph Nodes: No lymphadenopathy noted  Back: No CVA tenderness   Musculoskeletal: non tender  Breasts: Deferred  Genitourinary: deferred  Rectal: Deferred    Lab Results:  CBC  CBC Full  -  ( 12 Jan 2019 05:44 )  WBC Count : 10.41 K/uL  Hemoglobin : 11.6 g/dL  Hematocrit : 36.6 %  Platelet Count - Automated : 110 K/uL  Mean Cell Volume : 89.3 fl  Mean Cell Hemoglobin : 28.3 pg  Mean Cell Hemoglobin Concentration : 31.7 gm/dL  Auto Neutrophil # : x  Auto Lymphocyte # : x  Auto Monocyte # : x  Auto Eosinophil # : x  Auto Basophil # : x  Auto Neutrophil % : x  Auto Lymphocyte % : x  Auto Monocyte % : x  Auto Eosinophil % : x  Auto Basophil % : x    .		Differential:	[] Automated		[] Manual  Chemistry                        11.6   10.41 )-----------( 110      ( 12 Jan 2019 05:44 )             36.6     01-12    146<H>  |  112<H>  |  31<H>  ----------------------------<  84  4.3   |  27  |  1.08    Ca    8.5      12 Jan 2019 05:44        PT/INR - ( 13 Jan 2019 05:57 )   PT: 19.9 sec;   INR: 1.76 ratio                   MICROBIOLOGY/CULTURES:      RADIOLOGY RESULTS:                MEDICATIONS  (STANDING):  dextrose 5%. 1000 milliLiter(s) (50 mL/Hr) IV Continuous <Continuous>  dextrose 50% Injectable 12.5 Gram(s) IV Push once  diltiazem    milliGRAM(s) Oral daily  docusate sodium 100 milliGRAM(s) Oral three times a day  fluticasone propionate 50 MICROgram(s)/spray Nasal Spray 1 Spray(s) Both Nostrils <User Schedule>  furosemide    Tablet 20 milliGRAM(s) Oral daily  insulin glargine Injectable (LANTUS) 48 Unit(s) SubCutaneous at bedtime  insulin lispro (HumaLOG) corrective regimen sliding scale   SubCutaneous three times a day before meals  losartan 100 milliGRAM(s) Oral daily  metoprolol succinate ER 50 milliGRAM(s) Oral daily  multivitamin 1 Tablet(s) Oral daily  warfarin 2 milliGRAM(s) Oral once

## 2019-01-13 NOTE — PROGRESS NOTE ADULT - REASON FOR ADMISSION
dizziness and positive troponin

## 2019-01-14 ENCOUNTER — TRANSCRIPTION ENCOUNTER (OUTPATIENT)
Age: 84
End: 2019-01-14

## 2019-01-14 VITALS
DIASTOLIC BLOOD PRESSURE: 52 MMHG | SYSTOLIC BLOOD PRESSURE: 144 MMHG | TEMPERATURE: 98 F | OXYGEN SATURATION: 97 % | HEART RATE: 60 BPM | RESPIRATION RATE: 17 BRPM

## 2019-01-14 LAB
GLUCOSE BLDC GLUCOMTR-MCNC: 115 MG/DL — HIGH (ref 70–99)
GLUCOSE BLDC GLUCOMTR-MCNC: 137 MG/DL — HIGH (ref 70–99)
INR BLD: 1.99 RATIO — HIGH (ref 0.88–1.16)
PROTHROM AB SERPL-ACNC: 22.6 SEC — HIGH (ref 10–12.9)

## 2019-01-14 RX ORDER — WARFARIN SODIUM 2.5 MG/1
1 TABLET ORAL
Qty: 0 | Refills: 0 | DISCHARGE
Start: 2019-01-14

## 2019-01-14 RX ORDER — MECLIZINE HCL 12.5 MG
1 TABLET ORAL
Qty: 90 | Refills: 0
Start: 2019-01-14 | End: 2019-02-12

## 2019-01-14 RX ORDER — FUROSEMIDE 40 MG
0 TABLET ORAL
Qty: 0 | Refills: 0 | DISCHARGE
Start: 2019-01-14

## 2019-01-14 RX ORDER — FUROSEMIDE 40 MG
1 TABLET ORAL
Qty: 0 | Refills: 0 | DISCHARGE
Start: 2019-01-14

## 2019-01-14 RX ORDER — INSULIN GLARGINE 100 [IU]/ML
48 INJECTION, SOLUTION SUBCUTANEOUS
Qty: 0 | Refills: 0 | COMMUNITY

## 2019-01-14 RX ORDER — WARFARIN SODIUM 2.5 MG/1
1 TABLET ORAL
Qty: 0 | Refills: 0 | COMMUNITY

## 2019-01-14 RX ORDER — INSULIN GLARGINE 100 [IU]/ML
15 INJECTION, SOLUTION SUBCUTANEOUS
Refills: 0 | DISCHARGE
Start: 2019-01-14

## 2019-01-14 RX ORDER — WARFARIN SODIUM 2.5 MG/1
1 TABLET ORAL
Qty: 0 | Refills: 0 | COMMUNITY
Start: 2019-01-14

## 2019-01-14 RX ORDER — WARFARIN SODIUM 2.5 MG/1
1 TABLET ORAL
Refills: 0 | DISCHARGE
Start: 2019-01-14

## 2019-01-14 RX ORDER — FUROSEMIDE 40 MG
1.5 TABLET ORAL
Qty: 0 | Refills: 0 | COMMUNITY
Start: 2019-01-14

## 2019-01-14 RX ORDER — FUROSEMIDE 40 MG
2 TABLET ORAL
Qty: 0 | Refills: 0 | COMMUNITY

## 2019-01-14 RX ORDER — INSULIN GLARGINE 100 [IU]/ML
40 INJECTION, SOLUTION SUBCUTANEOUS
Qty: 0 | Refills: 0 | COMMUNITY
Start: 2019-01-14

## 2019-01-14 RX ORDER — FUROSEMIDE 40 MG
1 TABLET ORAL
Qty: 0 | Refills: 0 | COMMUNITY

## 2019-01-14 RX ADMIN — Medication 180 MILLIGRAM(S): at 05:59

## 2019-01-14 RX ADMIN — Medication 25 MILLIGRAM(S): at 01:59

## 2019-01-14 RX ADMIN — LOSARTAN POTASSIUM 100 MILLIGRAM(S): 100 TABLET, FILM COATED ORAL at 05:59

## 2019-01-14 RX ADMIN — Medication 1 SPRAY(S): at 11:25

## 2019-01-14 RX ADMIN — Medication 1 TABLET(S): at 11:31

## 2019-01-14 RX ADMIN — Medication 50 MILLIGRAM(S): at 05:59

## 2019-01-14 NOTE — DISCHARGE NOTE ADULT - PLAN OF CARE
control vertigo take meclizine 25 mg every 8 hours as needed for vertigo  f/u with Dr. Valles in 2-3 days  check your INR on 1/16 as your coumadin dose was increased to 4 mg.

## 2019-01-14 NOTE — DISCHARGE NOTE ADULT - CONDITIONS AT DISCHARGE
made a call to the patient's PCP: (Dr. Ruiz Valles [phone]) to schedule  [facility] labs to perform a home draw for INR on Wednesday: 1/16/2019. Results to be sent to Dr. CLAUDIA Valles.

## 2019-01-14 NOTE — DISCHARGE NOTE ADULT - CARE PROVIDER_API CALL
Ruiz Valles), Family Medicine  [address] [place] [address]  Phone: [phone]  Fax: [phone]    Trav Weaver), Cardiovascular Disease; Internal Medicine  [address] [place] [address]  Phone: [phone]  Fax: [phone]    Chanda Norwood), Neurology  [address] [place] [address]  Phone: [phone]  Fax: [phone]

## 2019-01-14 NOTE — DISCHARGE NOTE ADULT - PATIENT PORTAL LINK FT
You can access the [facility] Patient Portal, offered by [facility], by registering with the following website: [email or web address]

## 2019-01-14 NOTE — DISCHARGE NOTE ADULT - HOSPITAL COURSE
PHYSICAL EXAM:    Daily     Daily     ICU Vital Signs Last 24 Hrs  T(C): 36.5 (14 Jan 2019 11:36), Max: 36.7 (14 Jan 2019 05:02)  T(F): 97.7 (14 Jan 2019 11:36), Max: 98.1 (14 Jan 2019 05:02)  HR: 60 (14 Jan 2019 11:36) (60 - 70)  BP: 144/52 (14 Jan 2019 11:36) (144/52 - 148/60)  BP(mean): --  ABP: --  ABP(mean): --  RR: 17 (14 Jan 2019 11:36) (17 - 17)  SpO2: 97% (14 Jan 2019 11:36) (94% - 97%)      Constitutional: Well appearing  HEENT: Atraumatic, FLOYD, Normal, No congestion  Respiratory: Breath Sounds normal, no rhonchi/wheeze  Cardiovascular: N S1S2;  Gastrointestinal: Abdomen soft, non tender, Bowel Sounds present  Extremities: No edema, peripheral pulses present  Neurological: AAO x 3, no gross focal motor deficits  Skin: Non cellulitic, no rash, ulcers  Lymph Nodes: No lymphadenopathy noted  Back: No CVA tenderness   Musculoskeletal: non tender  Breasts: Deferred  Genitourinary: deferred  Rectal: Deferred    88/F with history of Afib on Coumadin, PPM, HTN, HLD, admitted with     *positive troponin r/o ACS/ PPM/ Atrial fibrillation- rate controlled  *dizziness likely related to Uncontrolled HTN- patient recently completed Steroid for viral bronchitis  - patient supposed to be on Lasix 30mg daily, continue  - in the ED- SBP>190 received hydralazine IV x1   - continue losartan, Cartia and Metoprolol - home regimen  - Cardiology consult- with Dr Weaver, appreciated   - CT head - unremarkable, repeat in am, no change  - Unable to obtain MRI - patient with PPM  - monitor BP,  - carotid doppler showed some plaque b/l  - echo noted  -increase meclizine to 25 mg prn vertigo  coumadin d/brandi; Eliquis started by Dr. Weaver but pt refused;  INR today was 1.99; cont increased dose of  coumadin of 4 mg ; INR 1.99, recheck on 1/16 with Dr. Valles    * s/p Viral bronchitis  - completed steroids as an outpatient   - on flonase and Albuterol PRN    *DMT2  - ISS  - continue Lantus 48U qHS  - checked A1c; 6.5    GURVINDER: resolved    Leukocytosis: ? source; resolved    Low Normal Finger Sticks in am: better now   decreased lantus to 40 units q hs, monitor very closely    poc discussed with pt, team    d/c home today    total time spent 37 min

## 2019-01-14 NOTE — DISCHARGE NOTE ADULT - MEDICATION SUMMARY - MEDICATIONS TO STOP TAKING
I will STOP taking the medications listed below when I get home from the hospital:    metoprolol tartrate 50 mg oral tablet  -- 1 tab(s) by mouth once a day    Cartia  mg/24 hours oral capsule, extended release  -- 1 cap(s) by mouth once a day    furosemide 20 mg oral tablet  -- 1 tab(s) by mouth every other day     **Also takes lasix 20mg 2 tab every other day    furosemide 20 mg oral tablet  -- 2 tab(s) by mouth every other day     **Also takes 1 tab every other day

## 2019-01-14 NOTE — DISCHARGE NOTE ADULT - CARE PLAN
Principal Discharge DX:	Dizziness  Goal:	control vertigo  Assessment and plan of treatment:	take meclizine 25 mg every 8 hours as needed for vertigo  f/u with Dr. Valles in 2-3 days  check your INR on 1/16 as your coumadin dose was increased to 4 mg.

## 2019-01-14 NOTE — DISCHARGE NOTE ADULT - MEDICATION SUMMARY - MEDICATIONS TO TAKE
I will START or STAY ON the medications listed below when I get home from the hospital:    acetaminophen 650 mg oral tablet, extended release  -- 2 tab(s) by mouth once a day (at bedtime)  -- Indication: For .    losartan 100 mg oral tablet  -- 1 tab(s) by mouth once a day  -- Indication: For .    Cartia  mg/24 hours oral capsule, extended release  -- 1 cap(s) by mouth once a day  -- Indication: For .    warfarin 4 mg oral tablet  -- 1 tab(s) by mouth once a day  -- Indication: For .    insulin glargine  -- 40 unit(s) subcutaneous once a day (at bedtime)  -- Indication: For .    Januvia 25 mg oral tablet  -- 1 tab(s) by mouth once a day  -- Indication: For .    meclizine 25 mg oral tablet  -- 1 tab(s) by mouth every 8 hours, As needed, Dizziness  -- Indication: For vertigo    Toprol-XL 50 mg oral tablet, extended release  -- 1 tab(s) by mouth once a day  -- Indication: For .    albuterol 2.5 mg/3 mL (0.083%) inhalation solution  -- 3 milliliter(s) inhaled every 6 hours  -- Indication: For .    furosemide 20 mg oral tablet  -- 1.5 tab(s) by mouth once a day  -- Indication: For .    Flonase 50 mcg/inh nasal spray  -- 1 spray(s) in each affected nostril once a day  -- Indication: For .    Multiple Vitamins oral tablet  -- 1 tab(s) by mouth once a day  -- Indication: For .    Vitamin D3 2000 intl units oral capsule  -- 1 cap(s) by mouth once a day  -- Indication: For .

## 2019-01-14 NOTE — DISCHARGE NOTE ADULT - MEDICATION SUMMARY - MEDICATIONS TO CHANGE
I will SWITCH the dose or number of times a day I take the medications listed below when I get home from the hospital:    Lantus  -- 48 unit(s) subcutaneous once a day (at bedtime)    warfarin 2.5 mg oral tablet  -- 1 tab(s) by mouth once a day

## 2019-01-18 NOTE — CDI QUERY NOTE - NSCDIOTHERTXTBX_GEN_ALL_CORE_HH
Dear Dr. Le                                                                                     01-18-19 @ 13:56      Clinical documentation indicates that this patient presented with  __dizziness__________.  In order to accurately capture the diagnosis to the greatest degree of specificity reflecting the patient’s actual severity of illness, the documentation in this patient’s medical record requires additional clarification.  Please include more specific documentation, either known or suspected, of the acuity, type and underlying cause of this condition in your Progress Note and/or Discharge Summary.  Consultant note states "Most likely Vertigo-positional; preceded by viral bronchitis, improving"  Your note states "dizziness likely related to Uncontrolled HTN- patient recently completed Steroid for viral bronchitis."  Please clarify the etiology of dizziness in the medical record.        Present on Admission:  Was the severity of the condition present on admission?  If so, please document in the chart that “(the condition) was present on admission.”    In responding to this request, please exercise your independent professional judgment.  The fact that a question is asked does not imply that any particular answer is desired or expected.    Documentation clarification is required for compliance, accuracy in coding and billing, and reporting severity of illness, quality data   and risk of mortality.

## 2019-01-29 DIAGNOSIS — D72.829 ELEVATED WHITE BLOOD CELL COUNT, UNSPECIFIED: ICD-10-CM

## 2019-01-29 DIAGNOSIS — Z53.29 PROCEDURE AND TREATMENT NOT CARRIED OUT BECAUSE OF PATIENT'S DECISION FOR OTHER REASONS: ICD-10-CM

## 2019-01-29 DIAGNOSIS — I16.0 HYPERTENSIVE URGENCY: ICD-10-CM

## 2019-01-29 DIAGNOSIS — I48.0 PAROXYSMAL ATRIAL FIBRILLATION: ICD-10-CM

## 2019-01-29 DIAGNOSIS — R42 DIZZINESS AND GIDDINESS: ICD-10-CM

## 2019-01-29 DIAGNOSIS — Z79.4 LONG TERM (CURRENT) USE OF INSULIN: ICD-10-CM

## 2019-01-29 DIAGNOSIS — Z79.01 LONG TERM (CURRENT) USE OF ANTICOAGULANTS: ICD-10-CM

## 2019-01-29 DIAGNOSIS — I10 ESSENTIAL (PRIMARY) HYPERTENSION: ICD-10-CM

## 2019-01-29 DIAGNOSIS — E78.5 HYPERLIPIDEMIA, UNSPECIFIED: ICD-10-CM

## 2019-01-29 DIAGNOSIS — E11.9 TYPE 2 DIABETES MELLITUS WITHOUT COMPLICATIONS: ICD-10-CM

## 2019-01-29 DIAGNOSIS — H81.10 BENIGN PAROXYSMAL VERTIGO, UNSPECIFIED EAR: ICD-10-CM

## 2019-01-29 DIAGNOSIS — Z79.51 LONG TERM (CURRENT) USE OF INHALED STEROIDS: ICD-10-CM

## 2019-01-29 DIAGNOSIS — J20.8 ACUTE BRONCHITIS DUE TO OTHER SPECIFIED ORGANISMS: ICD-10-CM

## 2019-01-29 DIAGNOSIS — Z79.52 LONG TERM (CURRENT) USE OF SYSTEMIC STEROIDS: ICD-10-CM

## 2019-01-29 DIAGNOSIS — E66.9 OBESITY, UNSPECIFIED: ICD-10-CM

## 2019-01-29 DIAGNOSIS — N17.9 ACUTE KIDNEY FAILURE, UNSPECIFIED: ICD-10-CM

## 2019-01-29 DIAGNOSIS — Z95.0 PRESENCE OF CARDIAC PACEMAKER: ICD-10-CM

## 2019-01-29 DIAGNOSIS — I35.0 NONRHEUMATIC AORTIC (VALVE) STENOSIS: ICD-10-CM

## 2019-01-29 DIAGNOSIS — Z90.710 ACQUIRED ABSENCE OF BOTH CERVIX AND UTERUS: ICD-10-CM

## 2019-02-01 PROBLEM — I48.0 PAROXYSMAL ATRIAL FIBRILLATION: Chronic | Status: ACTIVE | Noted: 2019-01-08

## 2019-02-01 PROBLEM — I10 ESSENTIAL (PRIMARY) HYPERTENSION: Chronic | Status: ACTIVE | Noted: 2019-01-07

## 2019-02-01 PROBLEM — E11.8 TYPE 2 DIABETES MELLITUS WITH UNSPECIFIED COMPLICATIONS: Chronic | Status: ACTIVE | Noted: 2019-01-08

## 2019-02-06 RX ORDER — ALBUTEROL 90 UG/1
3 AEROSOL, METERED ORAL
Qty: 0 | Refills: 0 | COMMUNITY

## 2019-02-06 RX ORDER — ACETAMINOPHEN 500 MG
2 TABLET ORAL
Qty: 0 | Refills: 0 | COMMUNITY

## 2019-02-06 RX ORDER — CHOLECALCIFEROL (VITAMIN D3) 125 MCG
1 CAPSULE ORAL
Qty: 0 | Refills: 0 | COMMUNITY

## 2019-02-06 NOTE — ASU PATIENT PROFILE, ADULT - MEDICATIONS TO HOLD
Hold lasix and januvia in a.m. of procedure; 1/2 lantus dose night before procedure.  As per patient, was instructed by MD office to hold coumadin prior to procedure and last took medication on 2/3/19.

## 2019-02-07 NOTE — H&P ADULT - PROBLEM SELECTOR PLAN 1
Pt is referred for Rt& Lt heart cath/possible PCI. Labs & medications are reviewed. Informed consent obtained after discussion of risks, benefits and alternatives  with patient. Risk discussed included, but not limited to MI, stroke, mortality, major bleeding, arrhythmia, or infection.  An educational material provided. Pt. verbalizes understandings of pre-procedural instructions.

## 2019-02-07 NOTE — H&P ADULT - NSHPPHYSICALEXAM_GEN_ALL_CORE
Vital Signs : BP        HR       RR                 Constitutional: well developed, well nourished, no deformities and no acute distress    Neurological: Alert & Oriented x 3, STOLL, no focal deficits    HEENT: NC/AT, PERRLA, EOMI,  Neck supple.    Respiratory: CTA B/L, No wheezing/crackles/rhonchi    Cardiovascular: (+) S1 & S2, RRR, No m/r/g    Gastrointestinal: soft, NT, nondistended, (+) BS    Genitourinary: non distended bladder, voiding freely    Extremities: No pedal edema, No clubbing, No cyanosis    Skin:  normal skin color and pigmentation, no skin lesions T(C): 36.4 (08 Feb 2019 08:23), Max: 36.4 (08 Feb 2019 08:23)  T(F): 97.6 (08 Feb 2019 08:23), Max: 97.6 (08 Feb 2019 08:23)  HR: 62 (08 Feb 2019 08:23) (62 - 62)  BP: 159/89 (08 Feb 2019 08:23) (159/89 - 159/89)  BP(mean): 107 (08 Feb 2019 08:23) (107 - 107)  RR: 18 (08 Feb 2019 08:23) (18 - 18)  SpO2: 100% (08 Feb 2019 08:23) (100% - 100%)    Constitutional: well developed, well nourished, no deformities and no acute distress    Neurological: Alert & Oriented x 3, STOLL, no focal deficits    HEENT: NC/AT, PERRLA, EOMI,  Neck supple.    Respiratory: CTA B/L, No wheezing/crackles/rhonchi    Cardiovascular: (+) S1 & S2, RRR, No m/r/g    Gastrointestinal: soft, NT, nondistended, (+) BS    Genitourinary: non distended bladder, voiding freely    Extremities: No pedal edema, No clubbing, No cyanosis    Skin:  normal skin color and pigmentation, no skin lesions T(C): 36.4 (08 Feb 2019 08:23), Max: 36.4 (08 Feb 2019 08:23)  T(F): 97.6 (08 Feb 2019 08:23), Max: 97.6 (08 Feb 2019 08:23)  HR: 62 (08 Feb 2019 08:23) (62 - 62)  BP: 159/89 (08 Feb 2019 08:23) (159/89 - 159/89)  BP(mean): 107 (08 Feb 2019 08:23) (107 - 107)  RR: 18 (08 Feb 2019 08:23) (18 - 18)  SpO2: 100% (08 Feb 2019 08:23) (100% - 100%)    Constitutional: well developed, well nourished, no deformities and no acute distress    Neurological: Alert & Oriented x 3, SOTLL, no focal deficits    HEENT: NC/AT, PERRLA, EOMI,  Neck supple.    Respiratory: CTA B/L, No wheezing/crackles/rhonchi    Cardiovascular: (+) S1 & S2, RRR. + murmur    Gastrointestinal: soft, NT, nondistended, (+) BS    Genitourinary: non distended bladder, voiding freely    Extremities: No pedal edema, No clubbing, No cyanosis    Skin:  normal skin color and pigmentation, no skin lesions

## 2019-02-07 NOTE — H&P ADULT - HISTORY OF PRESENT ILLNESS
89yo F with PMHx of HTN, type II DM, P. Afib on coumadin, s/p PPM, s/p recent hospitalization for vertigo. Echo showed mod AS & pt has been c/o VELAZCO.  Pt referred for Rt & LHC with possible intervention. 89yo F with PMHx of HTN, type II DM, P. Afib on coumadin, s/p PPM, s/p recent hospitalization for vertigo. Echo showed mod AS & pt has been c/o VELAZCO for the past few months.   Pt referred for Rt & LHC with possible intervention.

## 2019-02-07 NOTE — H&P ADULT - NSHPREVIEWOFSYSTEMS_GEN_ALL_CORE
General: Pt denies recent weight loss/fever/chills    Neurological: denies numbness or  sensation loss    Cardiovascular: denies chest pain/palpitations/leg edema    Respiratory and Thorax: denies SOB/cough/wheezing    Gastrointestinal: denies abdominal pain/diarrhea/constipation/bloody stool    Genitourinary: denies urinary frequency/urgency/ dysuria    Musculoskeletal: denies joint pain or swelling, denies restricted motion    Hematologic: denies abnormal bleeding General: Pt denies recent weight loss/fever/chills    Neurological: denies numbness or  sensation loss    Cardiovascular: denies chest pain/palpitations/leg edema    Respiratory and Thorax: denies SOB/cough/wheezing. + VELAZCO    Gastrointestinal: denies abdominal pain/diarrhea/constipation/bloody stool    Genitourinary: denies urinary frequency/urgency/ dysuria    Musculoskeletal: denies joint pain or swelling, denies restricted motion    Hematologic: denies abnormal bleeding

## 2019-02-07 NOTE — H&P ADULT - ASSESSMENT
87yo F with PMHx of HTN, type II DM, P. Afib on coumadin, s/p PPM, s/p recent hospitalization for vertigo. Echo showed mod AS & pt has been c/o VELAZCO.  Pt referred for Rt & LHC with possible intervention.

## 2019-02-08 ENCOUNTER — OUTPATIENT (OUTPATIENT)
Dept: OUTPATIENT SERVICES | Facility: HOSPITAL | Age: 84
LOS: 1 days | Discharge: HOME | End: 2019-02-08

## 2019-02-08 VITALS
SYSTOLIC BLOOD PRESSURE: 160 MMHG | HEART RATE: 72 BPM | DIASTOLIC BLOOD PRESSURE: 55 MMHG | OXYGEN SATURATION: 95 % | RESPIRATION RATE: 20 BRPM

## 2019-02-08 VITALS — WEIGHT: 222.01 LBS | HEIGHT: 63 IN

## 2019-02-08 DIAGNOSIS — Z90.710 ACQUIRED ABSENCE OF BOTH CERVIX AND UTERUS: Chronic | ICD-10-CM

## 2019-02-08 DIAGNOSIS — E11.9 TYPE 2 DIABETES MELLITUS WITHOUT COMPLICATIONS: ICD-10-CM

## 2019-02-08 DIAGNOSIS — I10 ESSENTIAL (PRIMARY) HYPERTENSION: ICD-10-CM

## 2019-02-08 DIAGNOSIS — I48.91 UNSPECIFIED ATRIAL FIBRILLATION: ICD-10-CM

## 2019-02-08 DIAGNOSIS — I27.20 PULMONARY HYPERTENSION, UNSPECIFIED: ICD-10-CM

## 2019-02-08 DIAGNOSIS — I35.0 NONRHEUMATIC AORTIC (VALVE) STENOSIS: ICD-10-CM

## 2019-02-08 DIAGNOSIS — Z95.0 PRESENCE OF CARDIAC PACEMAKER: Chronic | ICD-10-CM

## 2019-02-08 DIAGNOSIS — Z90.49 ACQUIRED ABSENCE OF OTHER SPECIFIED PARTS OF DIGESTIVE TRACT: Chronic | ICD-10-CM

## 2019-02-08 LAB — GLUCOSE BLDC GLUCOMTR-MCNC: 216 MG/DL — HIGH (ref 70–99)

## 2019-02-08 RX ORDER — DEXAMETHASONE 0.5 MG/5ML
4 ELIXIR ORAL ONCE
Qty: 0 | Refills: 0 | Status: COMPLETED | OUTPATIENT
Start: 2019-02-08 | End: 2019-02-08

## 2019-02-08 RX ORDER — DIPHENHYDRAMINE HCL 50 MG
50 CAPSULE ORAL ONCE
Qty: 0 | Refills: 0 | Status: COMPLETED | OUTPATIENT
Start: 2019-02-08 | End: 2019-02-08

## 2019-02-08 RX ADMIN — Medication 50 MILLIGRAM(S): at 08:25

## 2019-02-08 RX ADMIN — Medication 4 MILLIGRAM(S): at 11:25

## 2019-02-08 NOTE — PACU DISCHARGE NOTE - COMMENTS
Pt. given verbal and written instructions; pt. verbalizes understanding. Pt. ambulated in unit; tolerated well. Dressing remains d/i / examined by NP. Pt. given verbal and written instructions; pt. verbalizes understanding. Pt. ambulated in unit; tolerated well. Dressing remains d/i / examined by NP. Escorted by wheelchair by transporter to exit.

## 2019-02-08 NOTE — CHART NOTE - NSCHARTNOTEFT_GEN_A_CORE
s/p LHC and RHC. Denies chest pain, shortness of breath, dizziness or palpitations at this time  A+Ox3  CV: [id number]  Respiratory: CTAB  Right groin procedure site CDI.  no bleeding, no hematoma, site soft, non tender, positive pedal pulses bilaterally  ICU Vital Signs Last 24 Hrs  T(C): 36.4 (08 Feb 2019 08:23), Max: 36.4 (08 Feb 2019 08:23)  T(F): 97.6 (08 Feb 2019 08:23), Max: 97.6 (08 Feb 2019 08:23)  HR: 60 (08 Feb 2019 12:40) (60 - 62)  BP: 151/61 (08 Feb 2019 12:55) (151/61 - 160/62)  BP(mean): 107 (08 Feb 2019 08:23) (107 - 107)  RR: 16 (08 Feb 2019 12:55) (16 - 20)  SpO2: 99% (08 Feb 2019 12:55) (99% - 100%)        HPI:  87yo F with PMHx of HTN, type II DM, P. Afib on coumadin, s/p PPM, s/p recent hospitalization for vertigo. Echo showed mod AS & pt has been c/o VELAZCO for the past few months.   Pt referred for Rt & LHC with possible intervention. (07 Feb 2019 13:27)    now s/p LHC and RHC< from: Cardiac Cath Lab - Adult (02.08.19 @ 11:52) >    Normal coronary arteries.   Normal LV systolic function. Estimated LV ejection fraction is 60 %.   Severe aortic valve stenosis.     Mild pulmonary artery hypertension.     Recommendations     Consultation for TAVR as out pt.    < end of copied text >    --vital signs, labs, diet and activity per post procedure orders  -ambulate ad gabriela post bedrest  -encourage PO fluids  -plan of care discussed with patient, family and MD  -d/c after bedrest if stable  -post procedure and d/c instructions reviewed  -follow up with MD within 1week for AS management  -Discussed therapeutic lifestyle changes to reduce risk factors such as following a cardiac diet, weight loss, maintaining a healthy weight, exercise, smoking cessation, medication compliance, and regular follow-up  with MD

## 2019-02-22 ENCOUNTER — APPOINTMENT (OUTPATIENT)
Dept: NEUROLOGY | Facility: CLINIC | Age: 84
End: 2019-02-22
Payer: MEDICARE

## 2019-02-22 VITALS
HEIGHT: 63.5 IN | BODY MASS INDEX: 39.55 KG/M2 | WEIGHT: 226 LBS | HEART RATE: 62 BPM | SYSTOLIC BLOOD PRESSURE: 136 MMHG | DIASTOLIC BLOOD PRESSURE: 52 MMHG

## 2019-02-22 DIAGNOSIS — I65.29 OCCLUSION AND STENOSIS OF UNSPECIFIED CAROTID ARTERY: ICD-10-CM

## 2019-02-22 DIAGNOSIS — E78.00 PURE HYPERCHOLESTEROLEMIA, UNSPECIFIED: ICD-10-CM

## 2019-02-22 DIAGNOSIS — R42 DIZZINESS AND GIDDINESS: ICD-10-CM

## 2019-02-22 DIAGNOSIS — I65.23 OCCLUSION AND STENOSIS OF BILATERAL CAROTID ARTERIES: ICD-10-CM

## 2019-02-22 DIAGNOSIS — Z87.39 PERSONAL HISTORY OF OTHER DISEASES OF THE MUSCULOSKELETAL SYSTEM AND CONNECTIVE TISSUE: ICD-10-CM

## 2019-02-22 DIAGNOSIS — Z78.9 OTHER SPECIFIED HEALTH STATUS: ICD-10-CM

## 2019-02-22 DIAGNOSIS — E11.9 TYPE 2 DIABETES MELLITUS W/OUT COMPLICATIONS: ICD-10-CM

## 2019-02-22 DIAGNOSIS — Z86.79 PERSONAL HISTORY OF OTHER DISEASES OF THE CIRCULATORY SYSTEM: ICD-10-CM

## 2019-02-22 PROCEDURE — 99215 OFFICE O/P EST HI 40 MIN: CPT

## 2019-02-22 NOTE — PHYSICAL EXAM
[General Appearance - Alert] : alert [General Appearance - In No Acute Distress] : in no acute distress [FreeTextEntry1] : OBESE [Oriented To Time, Place, And Person] : oriented to person, place, and time [Impaired Insight] : insight and judgment were intact [Affect] : the affect was normal [Person] : oriented to person [Place] : oriented to place [Time] : oriented to time [Concentration Intact] : normal concentrating ability [Visual Intact] : visual attention was ~T not ~L decreased [Naming Objects] : no difficulty naming common objects [Repeating Phrases] : no difficulty repeating a phrase [Writing A Sentence] : no difficulty writing a sentence [Fluency] : fluency intact [Comprehension] : comprehension intact [Reading] : reading intact [Past History] : adequate knowledge of personal past history [Cranial Nerves Optic (II)] : visual acuity intact bilaterally,  visual fields full to confrontation, pupils equal round and reactive to light [Cranial Nerves Oculomotor (III)] : extraocular motion intact [Cranial Nerves Trigeminal (V)] : facial sensation intact symmetrically [Cranial Nerves Facial (VII)] : face symmetrical [Cranial Nerves Vestibulocochlear (VIII)] : hearing was intact bilaterally [Cranial Nerves Glossopharyngeal (IX)] : tongue and palate midline [Cranial Nerves Accessory (XI - Cranial And Spinal)] : head turning and shoulder shrug symmetric [Cranial Nerves Hypoglossal (XII)] : there was no tongue deviation with protrusion [No Muscle Atrophy] : normal bulk in all four extremities [Sensation Tactile Decrease] : light touch was intact [Proprioception] : proprioception was intact [Pain / Temp Decrease Distal Extremities (Glove & Stocking)] : diminished stocking/glove distribution [Glove / Stocking] : decreased in a glove and stocking distribution [Non-ambulatory] : Non-ambulatory [Past-pointing] : there was no past-pointing [Tremor] : no tremor present [2+] : Brachioradialis left 2+ [1+] : Patella left 1+ [0] : Ankle jerk left 0 [Plantar Reflex Right Only] : normal on the right [Plantar Reflex Left Only] : normal on the left [FreeTextEntry6] : No pronator drift, upper extremity strength 5/5, lower extremity 5-/5, no rigidity, no tremors, no hypertonia or tremors [FreeTextEntry8] : Uses a wheelchair for getting around [PERRL With Normal Accommodation] : pupils were equal in size, round, reactive to light, with normal accommodation [Extraocular Movements] : extraocular movements were intact [No APD] : no afferent pupillary defect [Full Visual Field] : full visual field [Outer Ear] : the ears and nose were normal in appearance [Hearing Threshold Finger Rub Not [place]] : hearing was normal [Neck Cervical Mass (___cm)] : no neck mass was observed [Exaggerated Use Of Accessory Muscles For Inspiration] : no accessory muscle use [Heart Rate And Rhythm] : heart rate was normal and rhythm regular [Heart Sounds] : normal S1 and S2 [Arterial Pulses Carotid] : carotid pulses were normal with no bruits [Edema] : there was no peripheral edema [No Spinal Tenderness] : no spinal tenderness [Involuntary Movements] : no involuntary movements were seen [] : no rash

## 2019-02-22 NOTE — DATA REVIEWED
[de-identified] : 1/8/19: CT head: No acute intracranial hemorrhage, mass effect, acute vascular territory infarction\par 1/9/19: Carotid Dopplers: Bilateral plaque without elevated velocities to suggest hemodynamic significant stenosis, however, given the degree of plaque CTA or MRA can be obtained

## 2019-02-22 NOTE — DISCUSSION/SUMMARY
[FreeTextEntry1] : 88-year-old female with PMH remarkable for HTN, HLD, DM, PPM and atrial fibrillation, is on Coumadin, was admitted to  on 1/8/19 with 2 day history of dizziness associated with nausea, room spinning sensation, symptoms were exacerbated by postural head/neck moments, SBP was > 190, treated with hydralazine and antihypertensives, symptoms of dizziness improved during the hospital stay, CT scan of the head done was unremarkable for acute lesions, after discharge home, she continues to have occasional dizziness with sudden postural head and neck changes, resolved with Antivert.\par \par # Most likely positional paroxysmal vertigo/peripheral vestibular dysfunction.\par \par - I have recommended starting vestibular therapy twice weekly for the next 3-6 weeks\par \par # Carotid artery plaques B/L\par \par - Recommend crestor or Lipitor in addition to Warfarin \par - Pt is scheduled to have valvular heart surgery; after evaluation/surgery she will followup with me, we may obtain CT angio (Patient cannot have MRI  as she has a pacemaker)

## 2019-02-22 NOTE — REVIEW OF SYSTEMS
[Poor Coordination] : poor coordination [Dizziness] : dizziness [Vertigo] : vertigo [Difficulty Walking] : difficulty walking [Shortness Of Breath] : shortness of breath [Negative] : Heme/Lymph [FreeTextEntry9] : knee arthritis / pain /LBP

## 2019-02-22 NOTE — HISTORY OF PRESENT ILLNESS
[FreeTextEntry1] : 88-year-old female with PMH remarkable for HTN, HLD, DM, PPM and atrial fibrillation, is on Coumadin, had presented to [facility] ED on 1/8/19 with 2 day history of dizziness associated with nausea and room spinning sensation, symptoms were exacerbated by postural head/neck moments, in ED patient was given Antivert, SBP was > 190, treated with hydralazine and antihypertensives, initial troponins were positive, she was further on monitored in the telemetry. Patient's symptoms of dizziness improved during the hospital stay, CT scan of the head done was unremarkable for acute lesions, carotid dopplers were c/w bilateral Carotid artery plaques; she was discharged home.\par \par Patient states that she continues to have occasional dizziness with sudden postural head and neck changes, she has been taking Antivert occasionally; she denies visual blurring, double vision or tinnitus.\par \par She has followed up with her cardiologist, she had a cardiac catheterization, no blockages were noted however she was noted to have a leaky valve, she has been referred for valvular surgery to [facility].\par Patient reports that she has difficulty walking, gets short of breath even after taking few steps, she has been wheelchair-bound most of the time.

## 2019-02-26 ENCOUNTER — APPOINTMENT (OUTPATIENT)
Dept: ELECTROPHYSIOLOGY | Facility: CLINIC | Age: 84
End: 2019-02-26
Payer: MEDICARE

## 2019-02-26 PROCEDURE — 93296 REM INTERROG EVL PM/IDS: CPT

## 2019-02-26 PROCEDURE — 93294 REM INTERROG EVL PM/LDLS PM: CPT

## 2019-05-15 ENCOUNTER — APPOINTMENT (OUTPATIENT)
Dept: ELECTROPHYSIOLOGY | Facility: CLINIC | Age: 84
End: 2019-05-15
Payer: MEDICARE

## 2019-05-15 VITALS
HEART RATE: 64 BPM | DIASTOLIC BLOOD PRESSURE: 60 MMHG | HEIGHT: 63.5 IN | SYSTOLIC BLOOD PRESSURE: 150 MMHG | WEIGHT: 226 LBS | BODY MASS INDEX: 39.55 KG/M2

## 2019-05-15 PROCEDURE — 93280 PM DEVICE PROGR EVAL DUAL: CPT

## 2019-05-25 ENCOUNTER — INPATIENT (INPATIENT)
Facility: HOSPITAL | Age: 84
LOS: 0 days | Discharge: HOME | End: 2019-05-26
Attending: INTERNAL MEDICINE | Admitting: INTERNAL MEDICINE
Payer: MEDICARE

## 2019-05-25 VITALS — HEIGHT: 63 IN | WEIGHT: 225.97 LBS

## 2019-05-25 DIAGNOSIS — Z90.49 ACQUIRED ABSENCE OF OTHER SPECIFIED PARTS OF DIGESTIVE TRACT: Chronic | ICD-10-CM

## 2019-05-25 DIAGNOSIS — Z95.0 PRESENCE OF CARDIAC PACEMAKER: Chronic | ICD-10-CM

## 2019-05-25 DIAGNOSIS — Z90.710 ACQUIRED ABSENCE OF BOTH CERVIX AND UTERUS: Chronic | ICD-10-CM

## 2019-05-25 LAB
ANION GAP SERPL CALC-SCNC: 5 MMOL/L — SIGNIFICANT CHANGE UP (ref 5–17)
ANION GAP SERPL CALC-SCNC: 7 MMOL/L — SIGNIFICANT CHANGE UP (ref 5–17)
APTT BLD: 38.4 SEC — HIGH (ref 27.5–36.3)
BASOPHILS # BLD AUTO: 0.05 K/UL — SIGNIFICANT CHANGE UP (ref 0–0.2)
BASOPHILS NFR BLD AUTO: 0.5 % — SIGNIFICANT CHANGE UP (ref 0–2)
BUN SERPL-MCNC: 30 MG/DL — HIGH (ref 7–23)
BUN SERPL-MCNC: 32 MG/DL — HIGH (ref 7–23)
CALCIUM SERPL-MCNC: 8.8 MG/DL — SIGNIFICANT CHANGE UP (ref 8.5–10.1)
CALCIUM SERPL-MCNC: 8.9 MG/DL — SIGNIFICANT CHANGE UP (ref 8.5–10.1)
CHLORIDE SERPL-SCNC: 107 MMOL/L — SIGNIFICANT CHANGE UP (ref 96–108)
CHLORIDE SERPL-SCNC: 109 MMOL/L — HIGH (ref 96–108)
CHOLEST SERPL-MCNC: 144 MG/DL — SIGNIFICANT CHANGE UP (ref 10–199)
CO2 SERPL-SCNC: 26 MMOL/L — SIGNIFICANT CHANGE UP (ref 22–31)
CO2 SERPL-SCNC: 29 MMOL/L — SIGNIFICANT CHANGE UP (ref 22–31)
CREAT SERPL-MCNC: 1.35 MG/DL — HIGH (ref 0.5–1.3)
CREAT SERPL-MCNC: 1.54 MG/DL — HIGH (ref 0.5–1.3)
EOSINOPHIL # BLD AUTO: 0.2 K/UL — SIGNIFICANT CHANGE UP (ref 0–0.5)
EOSINOPHIL NFR BLD AUTO: 2.1 % — SIGNIFICANT CHANGE UP (ref 0–6)
GLUCOSE BLDC GLUCOMTR-MCNC: 105 MG/DL — HIGH (ref 70–99)
GLUCOSE BLDC GLUCOMTR-MCNC: 113 MG/DL — HIGH (ref 70–99)
GLUCOSE BLDC GLUCOMTR-MCNC: 124 MG/DL — HIGH (ref 70–99)
GLUCOSE BLDC GLUCOMTR-MCNC: 181 MG/DL — HIGH (ref 70–99)
GLUCOSE SERPL-MCNC: 107 MG/DL — HIGH (ref 70–99)
GLUCOSE SERPL-MCNC: 146 MG/DL — HIGH (ref 70–99)
HBA1C BLD-MCNC: 6.2 % — HIGH (ref 4–5.6)
HCT VFR BLD CALC: 41.7 % — SIGNIFICANT CHANGE UP (ref 34.5–45)
HDLC SERPL-MCNC: 39 MG/DL — LOW
HGB BLD-MCNC: 13.2 G/DL — SIGNIFICANT CHANGE UP (ref 11.5–15.5)
IMM GRANULOCYTES NFR BLD AUTO: 0.6 % — SIGNIFICANT CHANGE UP (ref 0–1.5)
INR BLD: 2.44 RATIO — HIGH (ref 0.88–1.16)
INR BLD: 2.71 RATIO — HIGH (ref 0.88–1.16)
LIPID PNL WITH DIRECT LDL SERPL: 81 MG/DL — SIGNIFICANT CHANGE UP
LYMPHOCYTES # BLD AUTO: 2.34 K/UL — SIGNIFICANT CHANGE UP (ref 1–3.3)
LYMPHOCYTES # BLD AUTO: 24.8 % — SIGNIFICANT CHANGE UP (ref 13–44)
MCHC RBC-ENTMCNC: 28.5 PG — SIGNIFICANT CHANGE UP (ref 27–34)
MCHC RBC-ENTMCNC: 31.7 GM/DL — LOW (ref 32–36)
MCV RBC AUTO: 90.1 FL — SIGNIFICANT CHANGE UP (ref 80–100)
MONOCYTES # BLD AUTO: 0.95 K/UL — HIGH (ref 0–0.9)
MONOCYTES NFR BLD AUTO: 10.1 % — SIGNIFICANT CHANGE UP (ref 2–14)
NEUTROPHILS # BLD AUTO: 5.85 K/UL — SIGNIFICANT CHANGE UP (ref 1.8–7.4)
NEUTROPHILS NFR BLD AUTO: 61.9 % — SIGNIFICANT CHANGE UP (ref 43–77)
PLATELET # BLD AUTO: 126 K/UL — LOW (ref 150–400)
POTASSIUM SERPL-MCNC: 4.2 MMOL/L — SIGNIFICANT CHANGE UP (ref 3.5–5.3)
POTASSIUM SERPL-MCNC: 4.2 MMOL/L — SIGNIFICANT CHANGE UP (ref 3.5–5.3)
POTASSIUM SERPL-SCNC: 4.2 MMOL/L — SIGNIFICANT CHANGE UP (ref 3.5–5.3)
POTASSIUM SERPL-SCNC: 4.2 MMOL/L — SIGNIFICANT CHANGE UP (ref 3.5–5.3)
PROTHROM AB SERPL-ACNC: 27.9 SEC — HIGH (ref 10–12.9)
PROTHROM AB SERPL-ACNC: 31 SEC — HIGH (ref 10–12.9)
RBC # BLD: 4.63 M/UL — SIGNIFICANT CHANGE UP (ref 3.8–5.2)
RBC # FLD: 14.7 % — HIGH (ref 10.3–14.5)
SODIUM SERPL-SCNC: 141 MMOL/L — SIGNIFICANT CHANGE UP (ref 135–145)
SODIUM SERPL-SCNC: 142 MMOL/L — SIGNIFICANT CHANGE UP (ref 135–145)
TOTAL CHOLESTEROL/HDL RATIO MEASUREMENT: 3.7 RATIO — SIGNIFICANT CHANGE UP (ref 3.3–7.1)
TRIGL SERPL-MCNC: 121 MG/DL — SIGNIFICANT CHANGE UP (ref 10–149)
TROPONIN I SERPL-MCNC: 0.26 NG/ML — HIGH (ref 0.01–0.04)
TROPONIN I SERPL-MCNC: 0.28 NG/ML — HIGH (ref 0.01–0.04)
WBC # BLD: 9.45 K/UL — SIGNIFICANT CHANGE UP (ref 3.8–10.5)
WBC # FLD AUTO: 9.45 K/UL — SIGNIFICANT CHANGE UP (ref 3.8–10.5)

## 2019-05-25 PROCEDURE — 93880 EXTRACRANIAL BILAT STUDY: CPT | Mod: 26

## 2019-05-25 PROCEDURE — 71045 X-RAY EXAM CHEST 1 VIEW: CPT | Mod: 26

## 2019-05-25 PROCEDURE — 93010 ELECTROCARDIOGRAM REPORT: CPT

## 2019-05-25 PROCEDURE — 99285 EMERGENCY DEPT VISIT HI MDM: CPT | Mod: 25

## 2019-05-25 PROCEDURE — 70450 CT HEAD/BRAIN W/O DYE: CPT | Mod: 26

## 2019-05-25 PROCEDURE — 99223 1ST HOSP IP/OBS HIGH 75: CPT

## 2019-05-25 RX ORDER — CHOLECALCIFEROL (VITAMIN D3) 125 MCG
0 CAPSULE ORAL
Qty: 0 | Refills: 0 | DISCHARGE

## 2019-05-25 RX ORDER — DEXTROSE 50 % IN WATER 50 %
25 SYRINGE (ML) INTRAVENOUS ONCE
Refills: 0 | Status: DISCONTINUED | OUTPATIENT
Start: 2019-05-25 | End: 2019-05-26

## 2019-05-25 RX ORDER — DEXTROSE 50 % IN WATER 50 %
12.5 SYRINGE (ML) INTRAVENOUS ONCE
Refills: 0 | Status: DISCONTINUED | OUTPATIENT
Start: 2019-05-25 | End: 2019-05-26

## 2019-05-25 RX ORDER — DILTIAZEM HCL 120 MG
180 CAPSULE, EXT RELEASE 24 HR ORAL DAILY
Refills: 0 | Status: DISCONTINUED | OUTPATIENT
Start: 2019-05-25 | End: 2019-05-26

## 2019-05-25 RX ORDER — INSULIN GLARGINE 100 [IU]/ML
20 INJECTION, SOLUTION SUBCUTANEOUS AT BEDTIME
Refills: 0 | Status: DISCONTINUED | OUTPATIENT
Start: 2019-05-25 | End: 2019-05-26

## 2019-05-25 RX ORDER — SODIUM CHLORIDE 9 MG/ML
1000 INJECTION INTRAMUSCULAR; INTRAVENOUS; SUBCUTANEOUS
Refills: 0 | Status: DISCONTINUED | OUTPATIENT
Start: 2019-05-25 | End: 2019-05-26

## 2019-05-25 RX ORDER — WARFARIN SODIUM 2.5 MG/1
2.5 TABLET ORAL DAILY
Refills: 0 | Status: DISCONTINUED | OUTPATIENT
Start: 2019-05-25 | End: 2019-05-26

## 2019-05-25 RX ORDER — SODIUM CHLORIDE 9 MG/ML
1000 INJECTION, SOLUTION INTRAVENOUS
Refills: 0 | Status: DISCONTINUED | OUTPATIENT
Start: 2019-05-25 | End: 2019-05-26

## 2019-05-25 RX ORDER — ACETAMINOPHEN 500 MG
650 TABLET ORAL ONCE
Refills: 0 | Status: COMPLETED | OUTPATIENT
Start: 2019-05-25 | End: 2019-05-25

## 2019-05-25 RX ORDER — ATORVASTATIN CALCIUM 80 MG/1
40 TABLET, FILM COATED ORAL AT BEDTIME
Refills: 0 | Status: DISCONTINUED | OUTPATIENT
Start: 2019-05-25 | End: 2019-05-26

## 2019-05-25 RX ORDER — LOSARTAN POTASSIUM 100 MG/1
100 TABLET, FILM COATED ORAL DAILY
Refills: 0 | Status: DISCONTINUED | OUTPATIENT
Start: 2019-05-25 | End: 2019-05-25

## 2019-05-25 RX ORDER — GLUCAGON INJECTION, SOLUTION 0.5 MG/.1ML
1 INJECTION, SOLUTION SUBCUTANEOUS ONCE
Refills: 0 | Status: DISCONTINUED | OUTPATIENT
Start: 2019-05-25 | End: 2019-05-26

## 2019-05-25 RX ORDER — INSULIN LISPRO 100/ML
VIAL (ML) SUBCUTANEOUS
Refills: 0 | Status: DISCONTINUED | OUTPATIENT
Start: 2019-05-25 | End: 2019-05-26

## 2019-05-25 RX ORDER — METOPROLOL TARTRATE 50 MG
50 TABLET ORAL DAILY
Refills: 0 | Status: DISCONTINUED | OUTPATIENT
Start: 2019-05-25 | End: 2019-05-26

## 2019-05-25 RX ORDER — DEXTROSE 50 % IN WATER 50 %
15 SYRINGE (ML) INTRAVENOUS ONCE
Refills: 0 | Status: DISCONTINUED | OUTPATIENT
Start: 2019-05-25 | End: 2019-05-26

## 2019-05-25 RX ADMIN — Medication 650 MILLIGRAM(S): at 23:04

## 2019-05-25 RX ADMIN — WARFARIN SODIUM 2.5 MILLIGRAM(S): 2.5 TABLET ORAL at 21:14

## 2019-05-25 RX ADMIN — SODIUM CHLORIDE 100 MILLILITER(S): 9 INJECTION INTRAMUSCULAR; INTRAVENOUS; SUBCUTANEOUS at 08:29

## 2019-05-25 RX ADMIN — Medication 50 MILLIGRAM(S): at 17:17

## 2019-05-25 RX ADMIN — ATORVASTATIN CALCIUM 40 MILLIGRAM(S): 80 TABLET, FILM COATED ORAL at 21:14

## 2019-05-25 RX ADMIN — INSULIN GLARGINE 20 UNIT(S): 100 INJECTION, SOLUTION SUBCUTANEOUS at 21:14

## 2019-05-25 RX ADMIN — Medication 180 MILLIGRAM(S): at 17:17

## 2019-05-25 NOTE — H&P ADULT - NEUROLOGICAL DETAILS
sensation intact/cranial nerves intact/alert and oriented x 3/responds to pain/responds to verbal commands

## 2019-05-25 NOTE — CONSULT NOTE ADULT - SUBJECTIVE AND OBJECTIVE BOX
HPI: 88 year old female presents to the ED BIBA with symptoms of difficulty opening her right hand. States she was trying to unclasp her bra at 11:30pm and was unable to open her 4th and 5th digits of her right hand fully. Denies any difficulty with words, denies sensory deficit, denies difficulty with balance, denies headache.   Complex PMHX: recent aortic valve replacement 4/2019, on coumadin, hx of PAF, CAD, PPM, DM, HLD, HTN, carotid artery stenosis and plaque, spinal stenosis.       PAST MEDICAL & SURGICAL HISTORY:  Type 2 diabetes mellitus with complication, with long-term current use of insulin  Paroxysmal atrial fibrillation  HTN (hypertension)  Artificial cardiac pacemaker  History of appendectomy  H/O: hysterectomy  hx of AVR 4/2019    FAMILY HISTORY:  No pertinent family history in first degree relatives       Social Hx:  Nonsmoker, no drug or alcohol use    MEDICATIONS  (STANDING):  on coumadin at home  insulin   home meds being obtained by ER.      Allergies    aspirin (Rash)  shellfish (Anaphylaxis)    Intolerances        ROS: Pertinent positives in HPI, all other ROS were reviewed and are negative.      Vital Signs Last 24 Hrs  afebrile  HR: 64  BP: 150/60  RR: 16  SpO2: 98%    per triage and EMS papers      Constitutional: awake and alert.  HEENT: PERRLA, EOMI,   Neck: Supple.  Respiratory: Breath sounds are clear bilaterally  Cardiovascular: S1 and S2, irregular rhythm  Gastrointestinal: soft, nontender, protruberant, ND + bsx4  Extremities:  no edema  Musculoskeletal: no joint swelling/tenderness, no abnormal movements  Skin: No rashes    Neurological exam:  HF: A x O x 3. Appropriately interactive, normal affect. Speech fluent, No Aphasia or paraphasic errors. Naming /repetition intact   CN: TEN, EOMI, VFF, facial sensation normal, no NLFD, tongue midline, Palate moves equally, SCM equal bilaterally  Motor: No pronator drift, Strength 5/5 in all 4 ext, right  4+/5 difficulty opening 4th and 5th digits, normal bulk and tone, no tremor, rigidity or bradykinesia.    Sens: Intact to light touch / PP/ VS/ JS    Reflexes: Symmetric and normal  Coord:  No FNFA, dysmetria, LOUIE intact   Gait/Balance: not assessed     NIHSS: 1       Labs:                        13.2   9.45  )-----------( 126      ( 25 May 2019 00:32 )             41.7     INR: 2.44 ratio (05.25.19 @ 00:32)  POCT Blood Glucose.: 144: Notified ANIKET CAROLINA Notified mg/dL (05.25.19 @ 00:30)        < from: CT Brain Stroke Protocol (05.25.19 @ 00:42) >  IMPRESSION:  No acute intracranial hemorrhage or mass effect. Further evaluation with   MRI may beperformed as clinically indicated.    < end of copied text > HPI: 88 year old female PMHX of aortic valve replacement 4/2019, on coumadin, also hx of PAF, CAD, PPM, DM, HLD, HTN, carotid stenosis presented to the ED at 00.26 hrs BIBA with symptoms of difficulty opening her right hand; pt states she was trying to unclasp her bra at 11:30 pm and was unable to open her 4th and 5th digits of her right hand fully. Denies any difficulty with words, denies sensory deficit, denies difficulty with balance, denies headache.     PAST MEDICAL & SURGICAL HISTORY:  Type 2 diabetes mellitus with complication, with long-term current use of insulin  Paroxysmal atrial fibrillation  HTN (hypertension)  Artificial cardiac pacemaker  History of appendectomy  H/O: hysterectomy  hx of AVR 4/2019    FAMILY HISTORY:  No pertinent family history in first degree relatives    Social Hx:  Nonsmoker, no drug or alcohol use    Home Medications:   * Patient Currently Takes Medications as of 25-May-2019 05:16 documented in Structured Notes  · 	furosemide 20 mg oral tablet: Last Dose Taken:  , 1 tab(s) orally once a day, As Needed  	takes it occasionally  · 	meclizine 25 mg oral tablet: Last Dose Taken:  , 1 tab(s) orally every 8 hours, As needed, Dizziness  · 	insulin glargine: Last Dose Taken:  , 48 unit(s) subcutaneous once a day (at bedtime)  · 	warfarin 2.5 mg oral tablet: Last Dose Taken:  , 1 tab(s) orally once a day  · 	losartan 100 mg oral tablet: Last Dose Taken:  , 1 tab(s) orally once a day  · 	Januvia 25 mg oral tablet: Last Dose Taken:  , 1 tab(s) orally once a day  · 	Cartia  mg/24 hours oral capsule, extended release: Last Dose Taken:  , 1 cap(s) orally once a day  · 	Flonase 50 mcg/inh nasal spray: Last Dose Taken:  , 1 spray(s) in each affected nostril once a day  · 	Toprol-XL 50 mg oral tablet, extended release: Last Dose Taken:  , 1 tab(s) orally once a day    Allergies    aspirin (Rash)  shellfish (Anaphylaxis)    Intolerances      ROS: Pertinent positives in HPI, all other ROS were reviewed and are negative.      Vital Signs Last 24 Hrs  afebrile  HR: 64  BP: 150/60  RR: 16  SpO2: 98%    per triage and EMS papers      Constitutional: awake and alert.  HEENT: PERRLA, EOMI,   Neck: Supple.  Respiratory: Breath sounds are clear bilaterally  Cardiovascular: S1 and S2, irregular rhythm  Gastrointestinal: soft, nontender, protruberant, ND + bsx4  Extremities:  no edema  Musculoskeletal: no joint swelling/tenderness, no abnormal movements  Skin: No rashes    Neurological exam:  HF: A x O x 3. Appropriately interactive, normal affect. Speech fluent, No Aphasia or paraphasic errors. Naming /repetition intact   CN: TEN, EOMI, VFF, facial sensation normal, no NLFD, tongue midline, Palate moves equally, SCM equal bilaterally  Motor: No pronator drift, Strength 5/5 in all 4 ext, right  4+/5 difficulty opening 4th and 5th digits, normal bulk and tone, no tremor, rigidity or bradykinesia.    Sens: Intact to light touch / PP/ VS/ JS    Reflexes: Symmetric and normal  Coord:  No FNFA, dysmetria, LOUIE intact   Gait/Balance: not assessed     NIHSS: 1       Labs:                        13.2   9.45  )-----------( 126      ( 25 May 2019 00:32 )             41.7     INR: 2.44 ratio (05.25.19 @ 00:32)  POCT Blood Glucose.: 144: Notified ANIKET CAROLINA Notified mg/dL (05.25.19 @ 00:30)        < from: CT Brain Stroke Protocol (05.25.19 @ 00:42) >  IMPRESSION:  No acute intracranial hemorrhage or mass effect. Further evaluation with   MRI may beperformed as clinically indicated.    < end of copied text >

## 2019-05-25 NOTE — SWALLOW BEDSIDE ASSESSMENT ADULT - ADDITIONAL RECOMMENDATIONS
1)  NEURO CONSULT. NEED TO R/O CVA.    2) NUTRITION FOLLOW UP. NOTE THAT PATIENT WITH A HISTORY OF HTN, AND DM TYPE 2.    3) PT SHOULD HAVE HER U/L DENTURES IN PLACE WHEN EATING.

## 2019-05-25 NOTE — PHYSICAL THERAPY INITIAL EVALUATION ADULT - PLANNED THERAPY INTERVENTIONS, PT EVAL
strengthening/transfer training/ADLs/ROM/orthotic fitting/training/gait training/neuromuscular re-education

## 2019-05-25 NOTE — H&P ADULT - NSICDXPASTSURGICALHX_GEN_ALL_CORE_FT
PAST SURGICAL HISTORY:  Artificial cardiac pacemaker     H/O: hysterectomy     History of appendectomy

## 2019-05-25 NOTE — CONSULT NOTE ADULT - SUBJECTIVE AND OBJECTIVE BOX
Patient is a 88y old  Female who presents with a chief complaint of complain of right hand weakness and right Lower extremity weakness (25 May 2019 05:35)      HPI:  88 year old Female presents to the ED BIBA with symptoms of difficulty opening her right hand and some weakness in the right lower extremity started at 1130 pm. States she was trying to unclasp her bra at 11:30pm and was unable to open her 4th and 5th digits of her right hand fully. Denies any difficulty with words, denies sensory deficit, denies difficulty with balance, denies headache. denies fever. denies neck pain. no chest pain or sob. no abd pain. no n/v/d. no urinary f/u/d. no back pain. denies illicit drug use. no recent travel. no rash. no other acute issues symptoms or concerns.    Family Hx:   patient is unable to provide detail family history at this time. (25 May 2019 05:35)      PAST MEDICAL & SURGICAL HISTORY:  Type 2 diabetes mellitus with complication, with long-term current use of insulin  Paroxysmal atrial fibrillation  HTN (hypertension)  Artificial cardiac pacemaker  History of appendectomy  H/O: hysterectomy      PREVIOUS CARDIAC WORKUP:      Echo:  Stress Test:  Cardiac Cath:    ALLERGIES:    aspirin (Rash)  shellfish (Anaphylaxis)       MEDICATIONS  (STANDING):  dextrose 5%. 1000 milliLiter(s) (50 mL/Hr) IV Continuous <Continuous>  dextrose 50% Injectable 12.5 Gram(s) IV Push once  dextrose 50% Injectable 25 Gram(s) IV Push once  dextrose 50% Injectable 25 Gram(s) IV Push once  diltiazem    milliGRAM(s) Oral daily  insulin glargine Injectable (LANTUS) 20 Unit(s) SubCutaneous at bedtime  insulin lispro (HumaLOG) corrective regimen sliding scale   SubCutaneous three times a day before meals  metoprolol succinate ER 50 milliGRAM(s) Oral daily  sodium chloride 0.9%. 1000 milliLiter(s) (100 mL/Hr) IV Continuous <Continuous>  warfarin 2.5 milliGRAM(s) Oral daily    MEDICATIONS  (PRN):  dextrose 40% Gel 15 Gram(s) Oral once PRN Blood Glucose LESS THAN 70 milliGRAM(s)/deciliter  glucagon  Injectable 1 milliGRAM(s) IntraMuscular once PRN Glucose LESS THAN 70 milligrams/deciliter      FAMILY HISTORY:        SOCIAL HISTORY:  .scl     ROS:     .ros    Vital Signs Last 24 Hrs  T(C): 36.7 (25 May 2019 06:15), Max: 37 (25 May 2019 03:59)  T(F): 98.1 (25 May 2019 06:15), Max: 98.6 (25 May 2019 03:59)  HR: 61 (25 May 2019 07:05) (61 - 70)  BP: 147/45 (25 May 2019 07:05) (133/64 - 160/51)  BP(mean): --  RR: 18 (25 May 2019 06:15) (16 - 18)  SpO2: 100% (25 May 2019 06:15) (95% - 100%)    I&O's Summary      PHYSICAL EXAM:    .phy      TELEMETRY:    ECG:    LABS:                          13.2   9.45  )-----------( 126      ( 25 May 2019 00:32 )             41.7     05-25    142  |  109<H>  |  30<H>  ----------------------------<  107<H>  4.2   |  26  |  1.35<H>    Ca    8.8      25 May 2019 06:31      05-25 @ 06:31  Trop-I  0.255    05-25 @ 00:32  Trop-I  0.278    PT/INR - ( 25 May 2019 06:31 )   PT: 31.0 sec;   INR: 2.71 ratio    PTT - ( 25 May 2019 00:32 )  PTT:38.4 sec      RADIOLOGY & ADDITIONAL STUDIES:    Xray Chest 1 View- PORTABLE-Routine (05.25.19 @ 02:07) >  Impression: Clear lungs, grossly unchanged.    CT Brain Stroke Protocol (05.25.19 @ 00:42) >  IMPRESSION:  No acute intracranial hemorrhage or mass effect. Chief complaint of complain of right hand weakness      HPI:  88-year-old female admitted with complaints of right hand weakness. Inability to extend the right ring and little finger. She is able to flex the head into a fist. No complaints of motor deficit in the remaining of the right upper extremity. Denies speech difficulty. No headaches. Previous episode of left leg numbness after having transcutaneous aortic valve replacement for which outpatient workup has been negative. Paroxysmal atrial fibrillation, currently on anticoagulation. History of nonobstructive carotids.       PAST MEDICAL & SURGICAL HISTORY:  Type 2 diabetes mellitus with complication, with long-term current use of insulin  Paroxysmal atrial fibrillation  HTN (hypertension)  Artificial cardiac pacemaker  History of appendectomy  H/O: hysterectomy  TAVR for AS March 2019 at [facility]      PREVIOUS CARDIAC WORKUP:      Transthoracic Echocardiogram (01.09.19 @ 11:18) >  Pre TAVR   Summary     Fibrocalcific changes noted to the mitral valve leaflets with minimally restricted leaflet excursion. Trace to mild mitral regurgitation is present.   Significant fibrocalcific changes noted to the aortic valve leaflets with restriction in leaflet excursion. Peak transaortic gradient is 40 mmHg; this finding is consistent with mild aortic stenosis.   Mild to Moderate Tricuspid regurgitation is present.   Pulmonic valve not well seen, probably normal pulmonic valve function.   The left atrium is mildly dilated.   Mild concentric left ventricular hypertrophy is present. Estimated left ventricular ejection fraction is 60-65 %. EA reversal of the mitral inflow consistent with reduced compliance of the left ventricle.   Normal appearing right atrium.   A device wire is seen in the RV and RA.   No evidence of pericardial effusion.   No evidence of pleural effusion.      Cardiac Cath Lab - Adult (02.08.19 @ 11:52) >  Pre TAVR   Normal coronary arteries.   Normal LV systolic function. Estimated LV ejection fraction is 60 %.   Severe aortic valve stenosis.   Mild pulmonary artery hypertension.      ALLERGIES:    aspirin (Rash)  shellfish (Anaphylaxis)       MEDICATIONS  (STANDING):  dextrose 5%. 1000 milliLiter(s) (50 mL/Hr) IV Continuous <Continuous>  dextrose 50% Injectable 12.5 Gram(s) IV Push once  dextrose 50% Injectable 25 Gram(s) IV Push once  dextrose 50% Injectable 25 Gram(s) IV Push once  diltiazem    milliGRAM(s) Oral daily  insulin glargine Injectable (LANTUS) 20 Unit(s) SubCutaneous at bedtime  insulin lispro (HumaLOG) corrective regimen sliding scale   SubCutaneous three times a day before meals  metoprolol succinate ER 50 milliGRAM(s) Oral daily  sodium chloride 0.9%. 1000 milliLiter(s) (100 mL/Hr) IV Continuous <Continuous>  warfarin 2.5 milliGRAM(s) Oral daily    MEDICATIONS  (PRN):  dextrose 40% Gel 15 Gram(s) Oral once PRN Blood Glucose LESS THAN 70 milliGRAM(s)/deciliter  glucagon  Injectable 1 milliGRAM(s) IntraMuscular once PRN Glucose LESS THAN 70 milligrams/deciliter      FAMILY HISTORY:  NC        SOCIAL HISTORY:  Nonsmoker. No ETOH abuse. No illicit drugs.     ROS:     Detailed ten system ROS was performed and was negative except for history as eluded to above.    no fever  no chills  no nausea  no vomiting  no diarrhea  no constipation  no melena  no hematochezia  no chest pain  no palpitations  no sob at rest  no dyspnea on exertion  no cough  no wheezing  no anorexia  no headache  no dizziness  no syncope  no weakness  no myalgia  no dysuria  no polyuria  no hematuria       Vital Signs Last 24 Hrs  T(C): 36.7 (25 May 2019 06:15), Max: 37 (25 May 2019 03:59)  T(F): 98.1 (25 May 2019 06:15), Max: 98.6 (25 May 2019 03:59)  HR: 61 (25 May 2019 07:05) (61 - 70)  BP: 147/45 (25 May 2019 07:05) (133/64 - 160/51)  RR: 18 (25 May 2019 06:15) (16 - 18)  SpO2: 100% (25 May 2019 06:15) (95% - 100%)    I&O's Summary      PHYSICAL EXAM:    General:                Comfortable, AAO X 3, in no distress.   HEENT:                  Atraumatic, PERRLA, EOMI, conjunctiva clear.    Neck:                     Supple, no adenopathy, no thyromegaly, no JVD, no bruit.   Back:                     Symmetric, non tender.  Chest:                    Clear, B/L symmetric air entry, no tachypnea  Heart:                     S1, S2 normal, no gallop, + murmur.  Abdomen:              Soft, non-tender, bowel sounds active. No palpable masses.  Extremities:           no cyanosis, no edema. Peripheral pulses normal.  Skin:                      Skin color, texture normal. No rashes.  Neurologic:            Grossly nonfocal.       TELEMETRY:  NSR, PACs    ECG:  Sinus with PACs, LVH    LABS:                          13.2   9.45  )-----------( 126      ( 25 May 2019 00:32 )             41.7     05-25    142  |  109<H>  |  30<H>  ----------------------------<  107<H>  4.2   |  26  |  1.35<H>    Ca    8.8      25 May 2019 06:31      05-25 @ 06:31  Trop-I  0.255    05-25 @ 00:32  Trop-I  0.278    PT/INR - ( 25 May 2019 06:31 )   PT: 31.0 sec;   INR: 2.71 ratio    PTT - ( 25 May 2019 00:32 )  PTT:38.4 sec      RADIOLOGY & ADDITIONAL STUDIES:    Xray Chest 1 View- PORTABLE-Routine (05.25.19 @ 02:07) >  Impression: Clear lungs, grossly unchanged.    CT Brain Stroke Protocol (05.25.19 @ 00:42) >  IMPRESSION:  No acute intracranial hemorrhage or mass effect.

## 2019-05-25 NOTE — ED ADULT TRIAGE NOTE - PRO INTERPRETER NEED 2
Continue Regimen: BPO wash qd, Clindamycin solution 1-2 times daily,  mg bid
English
Detail Level: Simple

## 2019-05-25 NOTE — ED ADULT NURSE REASSESSMENT NOTE - NS ED NURSE REASSESS COMMENT FT1
pt remains as previously assessed, pt resting comfortably in bed at this time, pt updated on plan of care, safety and comfort measures maintained, will continue to monitor

## 2019-05-25 NOTE — ED ADULT NURSE NOTE - CHPI ED NUR SYMPTOMS NEG
no blurred vision/no confusion/no dizziness/no change in level of consciousness/no nausea/no numbness/no weakness/no vomiting/no loss of consciousness/no fever

## 2019-05-25 NOTE — ED ADULT NURSE NOTE - NSIMPLEMENTINTERV_GEN_ALL_ED
Implemented All Fall with Harm Risk Interventions:  [place] to call system. Call bell, personal items and telephone within reach. Instruct patient to call for assistance. Room bathroom lighting operational. Non-slip footwear when patient is off stretcher. Physically safe environment: no spills, clutter or unnecessary equipment. Stretcher in lowest position, wheels locked, appropriate side rails in place. Provide visual cue, wrist band, yellow gown, etc. Monitor gait and stability. Monitor for mental status changes and reorient to person, place, and time. Review medications for side effects contributing to fall risk. Reinforce activity limits and safety measures with patient and family. Provide visual clues: red socks.

## 2019-05-25 NOTE — ED PROVIDER NOTE - CLINICAL SUMMARY MEDICAL DECISION MAKING FREE TEXT BOX
neurology recs implemented still with right hand weakness nihhs 1 not interventional candidate possibly failing coumadin anticoagulation. ambulates with walker now with rue weakness will require PT, IM and neuro management with amdission to hospital pt and family afgree to plan of care chonrically elevated troponin no change in ekg no cp or sob in ED continue to trend

## 2019-05-25 NOTE — PHYSICAL THERAPY INITIAL EVALUATION ADULT - CRITERIA FOR SKILLED THERAPEUTIC INTERVENTIONS
impairments found/therapy frequency/rehab potential/anticipated discharge recommendation/anticipated equipment needs at discharge/functional limitations in following categories/risk reduction/prevention

## 2019-05-25 NOTE — PROGRESS NOTE ADULT - SUBJECTIVE AND OBJECTIVE BOX
HPI: 88 year old Female with PMHx of HTN, A fib on coumadin, DM2, PPM, AVR, presents to the ED BIBA with symptoms of difficulty opening her right hand and some weakness in the right lower extremity started at 1130 pm. States she was trying to unclasp her bra at 11:30pm and was unable to open her 4th and 5th digits of her right hand fully. Denies any difficulty with words, denies sensory deficit, denies difficulty with balance, denies headache. denies fever. denies neck pain. no chest pain or sob. no abd pain. no n/v/d. no urinary f/u/d. no back pain. denies illicit drug use. no recent travel. no rash. no other acute issues symptoms or concerns.    : still has right arm and right leg weakness  lipitor 40 mg started  Cr still 1.35, trending down on iv fluids      PHYSICAL EXAM:    Daily Height in cm: 160.02 (25 May 2019 00:26)    Daily Weight in k.2 (25 May 2019 06:15)    ICU Vital Signs Last 24 Hrs  T(C): 36.7 (25 May 2019 06:15), Max: 37 (25 May 2019 03:59)  T(F): 98.1 (25 May 2019 06:15), Max: 98.6 (25 May 2019 03:59)  HR: 61 (25 May 2019 07:05) (61 - 70)  BP: 147/45 (25 May 2019 07:05) (133/64 - 160/51)  BP(mean): --  ABP: --  ABP(mean): --  RR: 18 (25 May 2019 06:15) (16 - 18)  SpO2: 100% (25 May 2019 06:15) (95% - 100%)      Constitutional: Well appearing  HEENT: Atraumatic, FLOYD, Normal, No congestion  Respiratory: Breath Sounds normal, no rhonchi/wheeze  Cardiovascular: N S1S2; AMANDEEP present  Gastrointestinal: Abdomen soft, non tender, Bowel Sounds present  Extremities: No edema, peripheral pulses present  Neurological: AAO x 3, right arm 4/5; right leg 4.5/5  Skin: Non cellulitic, no rash, ulcers  Lymph Nodes: No lymphadenopathy noted  Back: No CVA tenderness   Musculoskeletal: non tender  Breasts: Deferred  Genitourinary: deferred  Rectal: Deferred                          13.2   9.45  )-----------( 126      ( 25 May 2019 00:32 )             41.7       CBC Full  -  ( 25 May 2019 00:32 )  WBC Count : 9.45 K/uL  RBC Count : 4.63 M/uL  Hemoglobin : 13.2 g/dL  Hematocrit : 41.7 %  Platelet Count - Automated : 126 K/uL  Mean Cell Volume : 90.1 fl  Mean Cell Hemoglobin : 28.5 pg  Mean Cell Hemoglobin Concentration : 31.7 gm/dL  Auto Neutrophil # : 5.85 K/uL  Auto Lymphocyte # : 2.34 K/uL  Auto Monocyte # : 0.95 K/uL  Auto Eosinophil # : 0.20 K/uL  Auto Basophil # : 0.05 K/uL  Auto Neutrophil % : 61.9 %  Auto Lymphocyte % : 24.8 %  Auto Monocyte % : 10.1 %  Auto Eosinophil % : 2.1 %  Auto Basophil % : 0.5 %      25    142  |  109<H>  |  30<H>  ----------------------------<  107<H>  4.2   |  26  |  1.35<H>    Ca    8.8      25 May 2019 06:31            PT/INR - ( 25 May 2019 06:31 )   PT: 31.0 sec;   INR: 2.71 ratio         PTT - ( 25 May 2019 00:32 )  PTT:38.4 sec    CARDIAC MARKERS ( 25 May 2019 06:31 )  0.255 ng/mL / x     / x     / x     / x      CARDIAC MARKERS ( 25 May 2019 00:32 )  0.278 ng/mL / x     / x     / x     / x                    MEDICATIONS  (STANDING):  atorvastatin 40 milliGRAM(s) Oral at bedtime  dextrose 5%. 1000 milliLiter(s) (50 mL/Hr) IV Continuous <Continuous>  dextrose 50% Injectable 12.5 Gram(s) IV Push once  dextrose 50% Injectable 25 Gram(s) IV Push once  dextrose 50% Injectable 25 Gram(s) IV Push once  diltiazem    milliGRAM(s) Oral daily  insulin glargine Injectable (LANTUS) 20 Unit(s) SubCutaneous at bedtime  insulin lispro (HumaLOG) corrective regimen sliding scale   SubCutaneous three times a day before meals  metoprolol succinate ER 50 milliGRAM(s) Oral daily  sodium chloride 0.9%. 1000 milliLiter(s) (100 mL/Hr) IV Continuous <Continuous>  warfarin 2.5 milliGRAM(s) Oral daily HPI: 88 year old Female with PMHx of HTN, A fib on coumadin, DM2, PPM, TAVR, presents to the ED BIBA with symptoms of difficulty opening her right hand and some weakness in the right lower extremity started at 1130 pm. States she was trying to unclasp her bra at 11:30pm and was unable to open her 4th and 5th digits of her right hand fully. Denies any difficulty with words, denies sensory deficit, denies difficulty with balance, denies headache. denies fever. denies neck pain. no chest pain or sob. no abd pain. no n/v/d. no urinary f/u/d. no back pain. denies illicit drug use. no recent travel. no rash. no other acute issues symptoms or concerns.    : still has right arm and right leg weakness  lipitor 40 mg started  Cr still 1.35, trending down on iv fluids      PHYSICAL EXAM:    Daily Height in cm: 160.02 (25 May 2019 00:26)    Daily Weight in k.2 (25 May 2019 06:15)    ICU Vital Signs Last 24 Hrs  T(C): 36.7 (25 May 2019 06:15), Max: 37 (25 May 2019 03:59)  T(F): 98.1 (25 May 2019 06:15), Max: 98.6 (25 May 2019 03:59)  HR: 61 (25 May 2019 07:05) (61 - 70)  BP: 147/45 (25 May 2019 07:05) (133/64 - 160/51)  BP(mean): --  ABP: --  ABP(mean): --  RR: 18 (25 May 2019 06:15) (16 - 18)  SpO2: 100% (25 May 2019 06:15) (95% - 100%)      Constitutional: Well appearing  HEENT: Atraumatic, FLOYD, Normal, No congestion  Respiratory: Breath Sounds normal, no rhonchi/wheeze  Cardiovascular: N S1S2; AMANDEEP present  Gastrointestinal: Abdomen soft, non tender, Bowel Sounds present  Extremities: No edema, peripheral pulses present  Neurological: AAO x 3, right arm 4/5; right leg 4.5/5  Skin: Non cellulitic, no rash, ulcers  Lymph Nodes: No lymphadenopathy noted  Back: No CVA tenderness   Musculoskeletal: non tender  Breasts: Deferred  Genitourinary: deferred  Rectal: Deferred                          13.2   9.45  )-----------( 126      ( 25 May 2019 00:32 )             41.7       CBC Full  -  ( 25 May 2019 00:32 )  WBC Count : 9.45 K/uL  RBC Count : 4.63 M/uL  Hemoglobin : 13.2 g/dL  Hematocrit : 41.7 %  Platelet Count - Automated : 126 K/uL  Mean Cell Volume : 90.1 fl  Mean Cell Hemoglobin : 28.5 pg  Mean Cell Hemoglobin Concentration : 31.7 gm/dL  Auto Neutrophil # : 5.85 K/uL  Auto Lymphocyte # : 2.34 K/uL  Auto Monocyte # : 0.95 K/uL  Auto Eosinophil # : 0.20 K/uL  Auto Basophil # : 0.05 K/uL  Auto Neutrophil % : 61.9 %  Auto Lymphocyte % : 24.8 %  Auto Monocyte % : 10.1 %  Auto Eosinophil % : 2.1 %  Auto Basophil % : 0.5 %          142  |  109<H>  |  30<H>  ----------------------------<  107<H>  4.2   |  26  |  1.35<H>    Ca    8.8      25 May 2019 06:31            PT/INR - ( 25 May 2019 06:31 )   PT: 31.0 sec;   INR: 2.71 ratio         PTT - ( 25 May 2019 00:32 )  PTT:38.4 sec    CARDIAC MARKERS ( 25 May 2019 06:31 )  0.255 ng/mL / x     / x     / x     / x      CARDIAC MARKERS ( 25 May 2019 00:32 )  0.278 ng/mL / x     / x     / x     / x                    MEDICATIONS  (STANDING):  atorvastatin 40 milliGRAM(s) Oral at bedtime  dextrose 5%. 1000 milliLiter(s) (50 mL/Hr) IV Continuous <Continuous>  dextrose 50% Injectable 12.5 Gram(s) IV Push once  dextrose 50% Injectable 25 Gram(s) IV Push once  dextrose 50% Injectable 25 Gram(s) IV Push once  diltiazem    milliGRAM(s) Oral daily  insulin glargine Injectable (LANTUS) 20 Unit(s) SubCutaneous at bedtime  insulin lispro (HumaLOG) corrective regimen sliding scale   SubCutaneous three times a day before meals  metoprolol succinate ER 50 milliGRAM(s) Oral daily  sodium chloride 0.9%. 1000 milliLiter(s) (100 mL/Hr) IV Continuous <Continuous>  warfarin 2.5 milliGRAM(s) Oral daily

## 2019-05-25 NOTE — ED ADULT TRIAGE NOTE - CHIEF COMPLAINT QUOTE
Patient reports that she began having trouble opening/closing her right fist starting at 2330. Patient denies any trauma. Patient is GCS 15 without any other reported focal/lateralizing neurologic deficits. Patient reports that she began having trouble opening/closing her right fist starting at 2330. Patient denies any trauma. Patient is GCS 15 without any other reported focal/lateralizing neurologic deficits. MD Edmonds evaluated patient in triage, code stroke called 0030.

## 2019-05-25 NOTE — CONSULT NOTE ADULT - ASSESSMENT
88 year old female s/p AVR on 4/2019 with new symptom of difficulty opening right hand 4th and 5th digit presents as a stroke code.   discussed with Dr. Alonso and patient  not a candidate for TPA due to supratherapeutic INR on coumadin and recent surgery of AVR 4/2019.   NIHSS 1 for minimal right hand  weakness   consider admission to medicine vs cardiology   stroke work up of echo, lipid panel, known hx of afib  glucose control  BP control- permissive HTN to be further decided by cardiology/medical team- patient with known hx of carotid stenosis/plaque however therapeutic INR would allow BP to ride to 160 for cerebral perfusion.   discussed with Dr. Edmonds in ER. 88 year old female with PMHX of aortic valve replacement 4/2019, on coumadin, also hx of PAF, CAD, PPM, DM, HLD, HTN, carotid stenosis presented to the ED at 00.26 hrs BIBA with symptoms of difficulty opening her right hand; pt states she was trying to unclasp her bra at 11:30 pm and was unable to open her 4th and 5th digits of right hand fully. Denies difficulty with words, denies sensory deficit, denies difficulty with balance, denies headache.     # TIA  vs evolving stroke - not a candidate for TPA due to supratherapeutic INR on coumadin and recent surgery of AVR 4/2019.   NIHSS 1     -  admission to tele   - stroke work echo, lipid panel,   - glucose control  - BP control- permissive HTN to be further decided by cardiology/medical team-   - patient with known hx of carotid stenosis/plaque however therapeutic INR would allow BP to ride to 160 for cerebral perfusion.       Neurology attending  -------------------------  Pt seen and examined.  Pt has slight weakness of right leg as well and right wrist drop, evolving CVA vs radial neuropathy; NIHSS 3  - continue coumadin  - In addition to above, start Lipitor 40 mg daily  - CT angio head / neck  - DVT prophylaxis  - PT/OT right arm    D/W Pt and Dr. Le

## 2019-05-25 NOTE — PROGRESS NOTE ADULT - ASSESSMENT
88 year old Female with PMHx of HTN, A fib on coumadin, DM2, PPM, AVR, presented with right sided weakness.    A/P:    1.  Right sided weakness: likely TIA vs Stroke  -will follow in telemetry bed with neuro checks  -follow echo  -follow carotid duplex   -follow PT eval  -patient passed dysphagia screen  -follow neuro and cardio consults  -follow lipid profile  -fall precaution  - start lipitor 40  CTA brain once Cr normalizes    2.  DM  -follow Dxt  -on ISS  -on lantus    3.  h/o A fib  -HR controlled  -continue coumadin    4.  Acute Kidney injury  -give IVF  -follow Creatinine; trending down    5.  DVT ppx: On coumadin    6.  Code status: Full code.     poc discussed with pt, team, Dr. Alonso 88 year old Female with PMHx of HTN, A fib on coumadin, DM2, PPM, TAVR, presented with right sided weakness.    A/P:    1.  Right sided weakness: likely TIA vs Stroke  -will follow in telemetry bed with neuro checks  -follow echo  -follow carotid duplex   -follow PT eval  -patient passed dysphagia screen  -follow neuro and cardio consults  -follow lipid profile  -fall precaution  - start lipitor 40  CTA brain once Cr normalizes    2.  DM  -follow Dxt  -on ISS  -on lantus    3.  h/o A fib  -HR controlled  -continue coumadin    4.  Acute Kidney injury  -give IVF  -follow Creatinine; trending down    5.  DVT ppx: On coumadin    6.  Code status: Full code.     poc discussed with pt, team, Dr. Alonso

## 2019-05-25 NOTE — SWALLOW BEDSIDE ASSESSMENT ADULT - SLP GENERAL OBSERVATIONS
The pt is alert, interactive and pleasant. She c/o acute onset of right sided weakness. Pt is oriented x3+ and able to verbalize during communicative probes as well as in conversation. At these times, her motor speech abilities were functional, her speech intelligibility was good, her verbalizations were fluent/linguistically intact/contextually appropriate and she feels that she is at communicative baseline. The pt was able to effectively verbalize her intents. Note that pt denied Dysphagia when asked.

## 2019-05-25 NOTE — SWALLOW BEDSIDE ASSESSMENT ADULT - SWALLOW EVAL: RECOMMENDED DIET
SUGGEST A REGULAR TEXTURE DIET WITH THIN LIQUID CONSISTENCIES AS THE PATIENT TOLERATES THE SAME FROM AN OROPHARYNGEAL SWALLOWING STANCE ON EXAM.

## 2019-05-25 NOTE — H&P ADULT - MOTOR
5/5 in Left upper extremity and Left lower extremity.  3-4/5 in Right lower extremity, +drift downward before 5 secs.  5/5 in right upper extremity, +drift downward before 10 secs.   cannot make full  in right hand. cannot move right 4th and 5th finger.

## 2019-05-25 NOTE — SWALLOW BEDSIDE ASSESSMENT ADULT - SWALLOW EVAL: DIAGNOSIS
1) The pt exhibits functional Oropharyngeal Swallowing abilities for age without overt s/s of Dysphagia or aspiration signs.   2) The pt is alert, interactive and pleasant. She c/o acute onset of right sided weakness. Pt is oriented x3+ and able to verbalize during communicative probes as well as in conversation. At these times, her motor speech abilities were functional, her speech intelligibility was good, her verbalizations were fluent/linguistically intact/contextually appropriate and she feels that she is at communicative baseline. The pt was able to effectively verbalize her intents. 1) The pt exhibits functional Oropharyngeal Swallowing abilities for age without overt s/s of Dysphagia or aspiration.   2) The pt is alert, interactive and pleasant. She c/o acute onset of right sided weakness. Pt is oriented x3+ and able to verbalize during communicative probes as well as in conversation. At these times, her motor speech abilities were functional, her speech intelligibility was good, her verbalizations were fluent/linguistically intact/contextually appropriate and she feels that she is at communicative baseline. The pt was able to effectively verbalize her intents.

## 2019-05-25 NOTE — ED PROVIDER NOTE - OBJECTIVE STATEMENT
88 year old female presents to the ED BIBA with symptoms of difficulty opening her right hand started at 1130 pm. States she was trying to unclasp her bra at 11:30pm and was unable to open her 4th and 5th digits of her right hand fully. Denies any difficulty with words, denies sensory deficit, denies difficulty with balance, denies headache. denies fever. denies neck pain. no chest pain or sob. no abd pain. no n/v/d. no urinary f/u/d. no back pain. no motor or sensory deficits. denies illicit drug use. no recent travel. no rash. no other acute issues symptoms or concerns

## 2019-05-25 NOTE — CONSULT NOTE ADULT - ASSESSMENT
CVA vs lower motor neuropathy  Mild elevated Trop-I unclear etiology  DM  PAF, SSS, s/p PPM  HTN    Suggest:    Neuro evaluation reg etiology of right hand weakness. GRISELDA if ischemic CVA etiology  Continue anticoagulation with coumadin. If an ischemic CVA sec to arterial thromboembolism she would be considered a coumadin failure. In that case will re evaluate for higher INR or switch to Eliquis (s/p TAVR) or LMWH  Statins  Previously normal cors earlier this year. Probably acute ischemic myocardial injury from demand.   Continue to optimize DM. A1c is 6.2  Permissive HTN  Pacer evaluation for AF burden  No MRI for CVA diagnosis because of pacer. She will get CT and CTA of the brain. Carotids are non obstructive.  D/w  and son at bedside.

## 2019-05-25 NOTE — ED ADULT NURSE NOTE - CHIEF COMPLAINT QUOTE
Patient reports that she began having trouble opening/closing her right fist starting at 2330. Patient denies any trauma. Patient is GCS 15 without any other reported focal/lateralizing neurologic deficits. MD Edmonds evaluated patient in triage, code stroke called 0030.

## 2019-05-25 NOTE — PHYSICAL THERAPY INITIAL EVALUATION ADULT - ACTIVE RANGE OF MOTION EXAMINATION, REHAB EVAL
Left UE Active ROM was WNL (within normal limits)/R 4th,5th digits postured in FLEXION,unable to extend fingers unassisted ,R shoulder/elbow/wrist are WNL as are digits 1-3 though a tendon popping over a bony prominence at DIP joint volar aspect R thumb due to arthritic changes,radial deviation of distal phalanx thumb/bilateral  lower extremity Active ROM was WFL (within functional limits)

## 2019-05-25 NOTE — H&P ADULT - NSICDXPASTMEDICALHX_GEN_ALL_CORE_FT
PAST MEDICAL HISTORY:  HTN (hypertension)     Paroxysmal atrial fibrillation     Type 2 diabetes mellitus with complication, with long-term current use of insulin

## 2019-05-25 NOTE — H&P ADULT - ASSESSMENT
87 yo female presented with right sided weakness.    A/P:    1.  Right sided weakness  TIA vs Stroke  Elevated troponin  -will follow in telemetry bed with neurochecks  -follow echo  -follow carotid duplex   -follow PT eval  -patient passed dysphagia screen  -follow neuro and cardio consults  -follow troponin  -follow lipid profile  -fall precaution    2.  DM  -follow Dxt  -on ISS  -on lantus    3.  h/o A fib  -HR controlled  -continue coumadin    4.  Acute Kidney injury  -give IVF  -follow Creatinine    5.  DVT ppx: On coumadin    6.  Code status: Full code.

## 2019-05-25 NOTE — ED ADULT NURSE NOTE - OBJECTIVE STATEMENT
pt reports upper extremity weakness starting at 11:30 pm 5/24/19, pt has some RUE weakness on neuro exam, code stroke called, pt AOx4

## 2019-05-25 NOTE — H&P ADULT - NSHPPHYSICALEXAM_GEN_ALL_CORE
Vital Signs Last 24 Hrs  T(C): 37 (25 May 2019 03:59), Max: 37 (25 May 2019 03:59)  T(F): 98.6 (25 May 2019 03:59), Max: 98.6 (25 May 2019 03:59)  HR: 64 (25 May 2019 03:59) (64 - 67)  BP: 133/64 (25 May 2019 03:59) (133/64 - 153/45)  RR: 16 (25 May 2019 03:59) (16 - 18)  SpO2: 95% (25 May 2019 03:59) (95% - 98%)

## 2019-05-25 NOTE — SWALLOW BEDSIDE ASSESSMENT ADULT - SWALLOW EVAL: CRITERIA FOR SKILLED INTERVENTION MET
NO NEED FOR FOR SPEECH OR SWALLOWING THERAPY. PT'S SPEECH-LANGUAGE AND OROPHARYNGEAL SWALLOWING ABILITIES ARE WITHIN FUNCTIONAL PARAMETERS. GIVEN ABOVE, WILL NOT ACTIVELY FOLLOW. RECONSULT PRN.

## 2019-05-25 NOTE — SWALLOW BEDSIDE ASSESSMENT ADULT - COMMENTS
The patient was admitted to  with acute onset of RUE/RLE weakness possibly reflective of a CVA. Neuro consult pending. Note that hospital course is also notable for elevated troponin and GURVINDER. She does have an underlying history of DM type 2, HTN, A-Fib s/p PPM, prior hysterectomy and appy.

## 2019-05-25 NOTE — H&P ADULT - HISTORY OF PRESENT ILLNESS
88 year old Female presents to the ED BIBA with symptoms of difficulty opening her right hand and some weakness in the right lower extremity started at 1130 pm. States she was trying to unclasp her bra at 11:30pm and was unable to open her 4th and 5th digits of her right hand fully. Denies any difficulty with words, denies sensory deficit, denies difficulty with balance, denies headache. denies fever. denies neck pain. no chest pain or sob. no abd pain. no n/v/d. no urinary f/u/d. no back pain. denies illicit drug use. no recent travel. no rash. no other acute issues symptoms or concerns.    Family Hx:   patient is unable to provide detail family history at this time.

## 2019-05-25 NOTE — PHYSICAL THERAPY INITIAL EVALUATION ADULT - FUNCTIONAL LIMITATIONS, PT EVAL
pt is somewhat ambidexterous ,Lhand dominant by history but trained herself to eat using R hand to avoid bumping elbows when dining

## 2019-05-26 ENCOUNTER — TRANSCRIPTION ENCOUNTER (OUTPATIENT)
Age: 84
End: 2019-05-26

## 2019-05-26 VITALS
OXYGEN SATURATION: 98 % | DIASTOLIC BLOOD PRESSURE: 54 MMHG | SYSTOLIC BLOOD PRESSURE: 166 MMHG | RESPIRATION RATE: 18 BRPM

## 2019-05-26 LAB
ANION GAP SERPL CALC-SCNC: 7 MMOL/L — SIGNIFICANT CHANGE UP (ref 5–17)
BUN SERPL-MCNC: 28 MG/DL — HIGH (ref 7–23)
CALCIUM SERPL-MCNC: 8.1 MG/DL — LOW (ref 8.5–10.1)
CHLORIDE SERPL-SCNC: 112 MMOL/L — HIGH (ref 96–108)
CO2 SERPL-SCNC: 24 MMOL/L — SIGNIFICANT CHANGE UP (ref 22–31)
CREAT SERPL-MCNC: 1.12 MG/DL — SIGNIFICANT CHANGE UP (ref 0.5–1.3)
GLUCOSE BLDC GLUCOMTR-MCNC: 140 MG/DL — HIGH (ref 70–99)
GLUCOSE BLDC GLUCOMTR-MCNC: 181 MG/DL — HIGH (ref 70–99)
GLUCOSE BLDC GLUCOMTR-MCNC: 90 MG/DL — SIGNIFICANT CHANGE UP (ref 70–99)
GLUCOSE SERPL-MCNC: 87 MG/DL — SIGNIFICANT CHANGE UP (ref 70–99)
INR BLD: 2.39 RATIO — HIGH (ref 0.88–1.16)
POTASSIUM SERPL-MCNC: 4 MMOL/L — SIGNIFICANT CHANGE UP (ref 3.5–5.3)
POTASSIUM SERPL-SCNC: 4 MMOL/L — SIGNIFICANT CHANGE UP (ref 3.5–5.3)
PROTHROM AB SERPL-ACNC: 27.3 SEC — HIGH (ref 10–12.9)
SODIUM SERPL-SCNC: 143 MMOL/L — SIGNIFICANT CHANGE UP (ref 135–145)

## 2019-05-26 PROCEDURE — 99232 SBSQ HOSP IP/OBS MODERATE 35: CPT

## 2019-05-26 RX ORDER — ATORVASTATIN CALCIUM 80 MG/1
1 TABLET, FILM COATED ORAL
Qty: 30 | Refills: 0
Start: 2019-05-26 | End: 2019-06-24

## 2019-05-26 RX ADMIN — Medication 180 MILLIGRAM(S): at 17:19

## 2019-05-26 RX ADMIN — Medication 2: at 17:19

## 2019-05-26 RX ADMIN — Medication 50 MILLIGRAM(S): at 17:18

## 2019-05-26 RX ADMIN — Medication 650 MILLIGRAM(S): at 00:00

## 2019-05-26 NOTE — DISCHARGE NOTE NURSING/CASE MANAGEMENT/SOCIAL WORK - NSDCDPATPORTLINK_GEN_ALL_CORE
You can access the [employer] Patient Portal, offered by [facility], by registering with the following website: [email or web address]

## 2019-05-26 NOTE — PROGRESS NOTE ADULT - SUBJECTIVE AND OBJECTIVE BOX
Pt has noted improvement of right arm/ hand weakness, no other complaints    Carotid dopplers revealed no significant stenosis    ROS: Pertinent positives in HPI, Denies difficulty with words, sensory deficit, difficulty with balance, headache. all other ROS were reviewed and are negative.      MEDICATIONS  (STANDING):  atorvastatin 40 milliGRAM(s) Oral at bedtime  dextrose 5%. 1000 milliLiter(s) (50 mL/Hr) IV Continuous <Continuous>  dextrose 50% Injectable 12.5 Gram(s) IV Push once  dextrose 50% Injectable 25 Gram(s) IV Push once  dextrose 50% Injectable 25 Gram(s) IV Push once  diltiazem    milliGRAM(s) Oral daily  insulin glargine Injectable (LANTUS) 20 Unit(s) SubCutaneous at bedtime  insulin lispro (HumaLOG) corrective regimen sliding scale   SubCutaneous three times a day before meals  metoprolol succinate ER 50 milliGRAM(s) Oral daily  sodium chloride 0.9%. 1000 milliLiter(s) (100 mL/Hr) IV Continuous <Continuous>  warfarin 2.5 milliGRAM(s) Oral daily      Vital Signs Last 24 Hrs  T(C): 36.8 (26 May 2019 04:54), Max: 36.8 (26 May 2019 04:54)  T(F): 98.3 (26 May 2019 04:54), Max: 98.3 (26 May 2019 04:54)  HR: 60 (26 May 2019 04:54) (60 - 75)  BP: 141/54 (26 May 2019 04:54) (141/53 - 161/50)  BP(mean): --  RR: 17 (26 May 2019 04:54) (16 - 18)  SpO2: 100% (26 May 2019 04:54) (99% - 100%)     Neurological exam:  HF: A x O x 3. Appropriately interactive, normal affect. Speech fluent, No Aphasia or paraphasic errors. Naming /repetition intact   CN: TEN, EOMI, VFF, facial sensation normal, no NLFD, tongue midline, Palate moves equally, SCM equal bilaterally  Motor: No pronator drift,  right  4/5, Strength 5/5 in all other ext, normal bulk and tone, no tremor, rigidity or bradykinesia.    Sens: Intact to light touch / PP    Reflexes: Symmetric and normal  Coord:  No FNFA, dysmetria, LOUIE intact   Gait/Balance: not assessed     NIHSS: 1                        13.2   9.45  )-----------( 126      ( 25 May 2019 00:32 )             41.7     05-26    143  |  112<H>  |  28<H>  ----------------------------<  87  4.0   |  24  |  1.12    Ca    8.1<L>      26 May 2019 05:27      05-25 [id number] 6.2    05-25 Chol 144 LDL 81 HDL 39<L> Trig 121    Radiology report:  < from: US Duplex Carotid Arteries Complete, Bilateral (05.25.19 @ 11:26) >  MPRESSION:       1.  Right carotid system:  No hemodynamically significant stenosis is   found.    2.  Left carotid system:  No hemodynamically significant stenosis is   found.    < end of copied text >

## 2019-05-26 NOTE — PROGRESS NOTE ADULT - ASSESSMENT
CVA vs lower motor neuropathy  Mild elevated Trop-I unclear etiology  DM  PAF, SSS, s/p PPM  HTN    Suggest:    Neuro evaluation reg etiology of right hand weakness. GRISELDA if ischemic CVA is the etiology  Continue anticoagulation with coumadin. If an ischemic CVA sec to arterial thromboembolism she would be considered a coumadin failure. In that case will re evaluate for higher INR or switch to Eliquis (s/p TAVR) or LMWH  Statins  Previously normal cors earlier this year. Elevated Trop-I probably from acute ischemic myocardial injury from demand.   Continue to optimize DM. A1c is 6.2  OPtimize HTN  Pacer evaluation for AF burden. But she is already anticoagulated.   No MRI for CVA diagnosis because of pacer. She will get CT and CTA of the brain. Shellfish allergy but has tolerated IV contrast for cardiac cath before with no issues. May premedicate with IV Benadryl and IV Solumedrol   Carotids are non obstructive on Duplex.

## 2019-05-26 NOTE — PROGRESS NOTE ADULT - REASON FOR ADMISSION
complain of right hand weakness and right Lower extremity weakness

## 2019-05-26 NOTE — PROGRESS NOTE ADULT - SUBJECTIVE AND OBJECTIVE BOX
CURRENT CARDIAC WORKUP:       Echo:  Stress Test:  Cardiac Cath:    Allergies:   aspirin (Rash)  shellfish (Anaphylaxis)      MEDICATIONS  (STANDING):  atorvastatin 40 milliGRAM(s) Oral at bedtime  dextrose 5%. 1000 milliLiter(s) (50 mL/Hr) IV Continuous <Continuous>  dextrose 50% Injectable 12.5 Gram(s) IV Push once  dextrose 50% Injectable 25 Gram(s) IV Push once  dextrose 50% Injectable 25 Gram(s) IV Push once  diltiazem    milliGRAM(s) Oral daily  insulin glargine Injectable (LANTUS) 20 Unit(s) SubCutaneous at bedtime  insulin lispro (HumaLOG) corrective regimen sliding scale   SubCutaneous three times a day before meals  metoprolol succinate ER 50 milliGRAM(s) Oral daily  sodium chloride 0.9%. 1000 milliLiter(s) (100 mL/Hr) IV Continuous <Continuous>  warfarin 2.5 milliGRAM(s) Oral daily    MEDICATIONS  (PRN):  dextrose 40% Gel 15 Gram(s) Oral once PRN Blood Glucose LESS THAN 70 milliGRAM(s)/deciliter  glucagon  Injectable 1 milliGRAM(s) IntraMuscular once PRN Glucose LESS THAN 70 milligrams/deciliter      ROS:     .ros      Vital Signs Last 24 Hrs  T(C): 36.8 (26 May 2019 04:54), Max: 36.8 (26 May 2019 04:54)  T(F): 98.3 (26 May 2019 04:54), Max: 98.3 (26 May 2019 04:54)  HR: 60 (26 May 2019 04:54) (60 - 75)  BP: 141/54 (26 May 2019 04:54) (141/53 - 161/50)  BP(mean): --  RR: 17 (26 May 2019 04:54) (16 - 18)  SpO2: 100% (26 May 2019 04:54) (99% - 100%)    I&O's Summary    25 May 2019 07:01  -  26 May 2019 07:00  --------------------------------------------------------  IN: 1199 mL / OUT: 0 mL / NET: 1199 mL        PHYSICAL EXAM:    .phy      TELEMETRY:    ECG:    LABS:                        13.2   9.45  )-----------( 126      ( 25 May 2019 00:32 )             41.7     05-26    143  |  112<H>  |  28<H>  ----------------------------<  87  4.0   |  24  |  1.12    Ca    8.1<L>      26 May 2019 05:27        Lipid Panel  Chl 144  HDL 39  LDL 81  Trg 121        05-25 @ 06:31  Trop-I 0.255  CK     --  CK MB  --    05-25 @ 00:32  Trop-I 0.278  CK     --  CK MB  --      PT/INR - ( 26 May 2019 05:27 )   PT: 27.3 sec;   INR: 2.39 ratio         PTT - ( 25 May 2019 00:32 )  PTT:38.4 sec          RADIOLOGY & ADDITIONAL STUDIES: 88-year-old female admitted with complaints of right hand weakness. Inability to extend the right ring and little finger. She is able to flex the head into a fist. No complaints of motor deficit in the remaining of the right upper extremity. Denies speech difficulty. No headaches. Previous episode of left leg numbness after having transcutaneous aortic valve replacement for which outpatient workup has been negative. Paroxysmal atrial fibrillation, currently on anticoagulation. History of nonobstructive carotids.     Today, feels better. Right ring finger has some improvement in extensor function. No new events otherwise.    PAST MEDICAL & SURGICAL HISTORY:  Type 2 diabetes mellitus with complication, with long-term current use of insulin  Paroxysmal atrial fibrillation  HTN (hypertension)  Artificial cardiac pacemaker  History of appendectomy  H/O: hysterectomy  TAVR for AS March 2019 at [facility]      PREVIOUS CARDIAC WORKUP:      Transthoracic Echocardiogram (01.09.19 @ 11:18) >  Pre TAVR   Summary     Fibrocalcific changes noted to the mitral valve leaflets with minimally restricted leaflet excursion. Trace to mild mitral regurgitation is present.   Significant fibrocalcific changes noted to the aortic valve leaflets with restriction in leaflet excursion. Peak transaortic gradient is 40 mmHg; this finding is consistent with mild aortic stenosis.   Mild to Moderate Tricuspid regurgitation is present.   Pulmonic valve not well seen, probably normal pulmonic valve function.   The left atrium is mildly dilated.   Mild concentric left ventricular hypertrophy is present. Estimated left ventricular ejection fraction is 60-65 %. EA reversal of the mitral inflow consistent with reduced compliance of the left ventricle.   Normal appearing right atrium.   A device wire is seen in the RV and RA.   No evidence of pericardial effusion.   No evidence of pleural effusion.      Cardiac Cath Lab - Adult (02.08.19 @ 11:52) >  Pre TAVR   Normal coronary arteries.   Normal LV systolic function. Estimated LV ejection fraction is 60 %.   Severe aortic valve stenosis.   Mild pulmonary artery hypertension.    Allergies:   aspirin (Rash)  shellfish (Anaphylaxis)      MEDICATIONS  (STANDING):  atorvastatin 40 milliGRAM(s) Oral at bedtime  dextrose 5%. 1000 milliLiter(s) (50 mL/Hr) IV Continuous <Continuous>  dextrose 50% Injectable 12.5 Gram(s) IV Push once  dextrose 50% Injectable 25 Gram(s) IV Push once  dextrose 50% Injectable 25 Gram(s) IV Push once  diltiazem    milliGRAM(s) Oral daily  insulin glargine Injectable (LANTUS) 20 Unit(s) SubCutaneous at bedtime  insulin lispro (HumaLOG) corrective regimen sliding scale   SubCutaneous three times a day before meals  metoprolol succinate ER 50 milliGRAM(s) Oral daily  sodium chloride 0.9%. 1000 milliLiter(s) (100 mL/Hr) IV Continuous <Continuous>  warfarin 2.5 milliGRAM(s) Oral daily    MEDICATIONS  (PRN):  dextrose 40% Gel 15 Gram(s) Oral once PRN Blood Glucose LESS THAN 70 milliGRAM(s)/deciliter  glucagon  Injectable 1 milliGRAM(s) IntraMuscular once PRN Glucose LESS THAN 70 milligrams/deciliter      ROS:     Detailed ten system ROS was performed and was negative except for history as eluded to above.    no fever  no chills  no nausea  no vomiting  no diarrhea  no constipation  no melena  no hematochezia  no chest pain  no palpitations  no sob at rest  no dyspnea on exertion  no cough  no wheezing  no anorexia  no headache  no dizziness  no syncope  no weakness  no myalgia  no dysuria  no polyuria  no hematuria       Vital Signs Last 24 Hrs  T(C): 36.8 (26 May 2019 04:54), Max: 36.8 (26 May 2019 04:54)  T(F): 98.3 (26 May 2019 04:54), Max: 98.3 (26 May 2019 04:54)  HR: 60 (26 May 2019 04:54) (60 - 75)  BP: 141/54 (26 May 2019 04:54) (141/53 - 161/50)  RR: 17 (26 May 2019 04:54) (16 - 18)  SpO2: 100% (26 May 2019 04:54) (99% - 100%)    I&O's Summary    25 May 2019 07:01  -  26 May 2019 07:00  --------------------------------------------------------  IN: 1199 mL / OUT: 0 mL / NET: 1199 mL        PHYSICAL EXAM:    General:                Comfortable, AAO X 3, in no distress.   HEENT:                  Atraumatic, PERRLA, EOMI, conjunctiva clear.    Neck:                     Supple, no adenopathy, no thyromegaly, no JVD, no bruit.   Back:                     Symmetric, non tender.  Chest:                    Clear, B/L symmetric air entry, no tachypnea  Heart:                     S1, S2 normal, no gallop, + murmur.  Abdomen:              Soft, non-tender, bowel sounds active. No palpable masses.  Extremities:           no cyanosis, no edema. Peripheral pulses normal.  Skin:                      Skin color, texture normal. No rashes.  Neurologic:            right 4th and 5th finger weakness to extend. Otherwise normal exam      TELEMETRY:  NSR, PACs    ECG:  Sinus with PACs, LVH    LABS:                        13.2   9.45  )-----------( 126      ( 25 May 2019 00:32 )             41.7     05-26    143  |  112<H>  |  28<H>  ----------------------------<  87  4.0   |  24  |  1.12    Ca    8.1<L>      26 May 2019 05:27        Lipid Panel  Chl 144  HDL 39  LDL 81  Trg 121      05-25 @ 06:31  Trop-I 0.255    05-25 @ 00:32  Trop-I 0.278      PT/INR - ( 26 May 2019 05:27 )   PT: 27.3 sec;   INR: 2.39 ratio    PTT - ( 25 May 2019 00:32 )  PTT:38.4 sec      RADIOLOGY & ADDITIONAL STUDIES:    US Duplex Carotid Arteries Complete, Bilateral (05.25.19 @ 11:26) > IMPRESSION:   1.  Right carotid system:  No hemodynamically significant stenosis is found.   2.  Left carotid system:  No hemodynamically significant stenosis is found.

## 2019-05-26 NOTE — DISCHARGE NOTE PROVIDER - CARE PROVIDER_API CALL
Chanda Norwood)  Neurology  [address], [address]  [place] [address]  Phone: [phone]  Fax: [phone]  Follow Up Time:     Keeley Rodriguez)  Cardiology; Interventional Cardiology  [address] [facility] [address], [place] [address]  Phone: [phone]  Fax: [phone]  Follow Up Time:

## 2019-05-26 NOTE — PROGRESS NOTE ADULT - ASSESSMENT
88 year old female with PMHX of aortic valve replacement 4/2019, on coumadin, also hx of PAF, CAD, PPM, DM, HLD, HTN, carotid stenosis presented to the ED at 00.26 hrs BIBA with symptoms of difficulty opening her right hand; pt states she was trying to unclasp her bra at 11:30 pm and was unable to open her 4th and 5th digits of right hand fully. Denies difficulty with words, denies sensory deficit, denies difficulty with balance, denies headache.     # TIA  vs evolving stroke - not a candidate for TPA due to therapeutic INR and recent surgery of AVR 4/2019. Remakable improvement.  NIHSS 1     # Right wrist drop, radial neuropathy; could be ischemic / compressive    - continue coumadin  - Lipitor 40 mg daily  - echo, lipid panel,  - DVT prophylaxis  - PT/OT right arm  - will cancel CT angio as pt has iodine allergy and not going to cahnge management    F/U with me in 3-4 weeks, may need EMG.NCV studies     D/W pt

## 2019-05-26 NOTE — PROGRESS NOTE ADULT - SUBJECTIVE AND OBJECTIVE BOX
HPI: 88 year old Female with PMHx of HTN, A fib on coumadin, DM2, PPM, TAVR, presents to the ED BIBA with symptoms of difficulty opening her right hand and some weakness in the right lower extremity started at 1130 pm. States she was trying to unclasp her bra at 11:30pm and was unable to open her 4th and 5th digits of her right hand fully. Denies any difficulty with words, denies sensory deficit, denies difficulty with balance, denies headache. denies fever. denies neck pain. no chest pain or sob. no abd pain. no n/v/d. no urinary f/u/d. no back pain. denies illicit drug use. no recent travel. no rash. no other acute issues symptoms or concerns.    5/25: still has right arm and right leg weakness  lipitor 40 mg started  Cr still 1.35, trending down on iv fluids    5/26: right leg weakness resolved; right hand weakness present  pt allergic to shell fish; had severe reaction with sob.   Cr normalized to 1.12  INR 2.39    PHYSICAL EXAM:    Vital Signs Last 24 Hrs  T(C): 36.8 (26 May 2019 04:54), Max: 36.8 (26 May 2019 04:54)  T(F): 98.3 (26 May 2019 04:54), Max: 98.3 (26 May 2019 04:54)  HR: 60 (26 May 2019 04:54) (60 - 75)  BP: 141/54 (26 May 2019 04:54) (141/53 - 161/50)  BP(mean): --  RR: 17 (26 May 2019 04:54) (16 - 18)  SpO2: 100% (26 May 2019 04:54) (99% - 100%)      Constitutional: Well appearing  HEENT: Atraumatic, FLOYD, Normal, No congestion  Respiratory: Breath Sounds normal, no rhonchi/wheeze  Cardiovascular: N S1S2; AMANDEEP present  Gastrointestinal: Abdomen soft, non tender, Bowel Sounds present  Extremities: No edema, peripheral pulses present  Neurological: AAO x 3, right arm 4/5; right leg 5/5  Skin: Non cellulitic, no rash, ulcers  Lymph Nodes: No lymphadenopathy noted  Back: No CVA tenderness   Musculoskeletal: non tender  Breasts: Deferred  Genitourinary: deferred  Rectal: Deferred      Lab Results:  CBC  CBC Full  -  ( 25 May 2019 00:32 )  WBC Count : 9.45 K/uL  RBC Count : 4.63 M/uL  Hemoglobin : 13.2 g/dL  Hematocrit : 41.7 %  Platelet Count - Automated : 126 K/uL  Mean Cell Volume : 90.1 fl  Mean Cell Hemoglobin : 28.5 pg  Mean Cell Hemoglobin Concentration : 31.7 gm/dL  Auto Neutrophil # : 5.85 K/uL  Auto Lymphocyte # : 2.34 K/uL  Auto Monocyte # : 0.95 K/uL  Auto Eosinophil # : 0.20 K/uL  Auto Basophil # : 0.05 K/uL  Auto Neutrophil % : 61.9 %  Auto Lymphocyte % : 24.8 %  Auto Monocyte % : 10.1 %  Auto Eosinophil % : 2.1 %  Auto Basophil % : 0.5 %    .		Differential:	[] Automated		[] Manual  Chemistry                        13.2   9.45  )-----------( 126      ( 25 May 2019 00:32 )             41.7     05-26    143  |  112<H>  |  28<H>  ----------------------------<  87  4.0   |  24  |  1.12    Ca    8.1<L>      26 May 2019 05:27        PT/INR - ( 26 May 2019 05:27 )   PT: 27.3 sec;   INR: 2.39 ratio         PTT - ( 25 May 2019 00:32 )  PTT:38.4 sec          MICROBIOLOGY/CULTURES:      RADIOLOGY RESULTS:                    MEDICATIONS  (STANDING):  atorvastatin 40 milliGRAM(s) Oral at bedtime  dextrose 5%. 1000 milliLiter(s) (50 mL/Hr) IV Continuous <Continuous>  dextrose 50% Injectable 12.5 Gram(s) IV Push once  dextrose 50% Injectable 25 Gram(s) IV Push once  dextrose 50% Injectable 25 Gram(s) IV Push once  diltiazem    milliGRAM(s) Oral daily  insulin glargine Injectable (LANTUS) 20 Unit(s) SubCutaneous at bedtime  insulin lispro (HumaLOG) corrective regimen sliding scale   SubCutaneous three times a day before meals  metoprolol succinate ER 50 milliGRAM(s) Oral daily  sodium chloride 0.9%. 1000 milliLiter(s) (100 mL/Hr) IV Continuous <Continuous>  warfarin 2.5 milliGRAM(s) Oral daily

## 2019-05-26 NOTE — DISCHARGE NOTE PROVIDER - HOSPITAL COURSE
88 year old Female with PMHx of HTN, A fib on coumadin, DM2, PPM, TAVR, presented with right sided weakness.        A/P:        1.    Right sided weakness: unlikely TIA or Stroke as per neurologist    -follow echo as out pt    -follow carotid duplex : normal    - PT eval, no needs    -patient passed dysphagia screen    -appreciate neuro and cardio consults: no CTA head needed as per neuro    -follow lipid profile, LDL 81    - cont lipitor 40            2.    DM:    -home meds        3.    h/o A fib    -HR controlled    -continue coumadin        4.    Acute Kidney injury: normalized        pt cleared by Dr. Alonso for discharge.     Pt wants to go home and would f/u with Dr. Alonso and Dr. Rodriguez as out pt for further workup         total time spent 43 min        poc discussed with pt, team, Dr. Alonso , Dr. Rodriguez

## 2019-05-26 NOTE — DISCHARGE NOTE NURSING/CASE MANAGEMENT/SOCIAL WORK - NSDCPEPTCOWAR_GEN_ALL_CORE
Coumadin/Warfarin - Dietary Advice/Coumadin/Warfarin - Potential for adverse drug reactions and interactions/Coumadin/Warfarin - Compliance/Coumadin/Warfarin - Follow up monitoring

## 2019-05-26 NOTE — PROGRESS NOTE ADULT - ASSESSMENT
88 year old Female with PMHx of HTN, A fib on coumadin, DM2, PPM, TAVR, presented with right sided weakness.    A/P:    1.  Right sided weakness: likely TIA vs Stroke  -will follow in telemetry bed with neuro checks  -follow echo  -follow carotid duplex : normal  - PT eval  -patient passed dysphagia screen  -appreciate neuro and cardio consults: CTA head, GRISELDA likely  -follow lipid profile, LDL 81  -fall precaution  - cont lipitor 40      2.  DM:  -on ISS  -on lantus    3.  h/o A fib  -HR controlled  -continue coumadin    4.  Acute Kidney injury  -give IVF  -follow Creatinine; trending down, normalized    5.  DVT ppx: On coumadin    6.  Code status: Full code.     poc discussed with pt, team,

## 2019-05-27 RX ORDER — DILTIAZEM HCL 120 MG
1 CAPSULE, EXT RELEASE 24 HR ORAL
Qty: 0 | Refills: 0 | DISCHARGE

## 2019-05-27 RX ORDER — DILTIAZEM HCL 120 MG
1 CAPSULE, EXT RELEASE 24 HR ORAL
Qty: 30 | Refills: 0
Start: 2019-05-27 | End: 2019-06-25

## 2019-05-28 ENCOUNTER — CLINICAL ADVICE (OUTPATIENT)
Age: 84
End: 2019-05-28

## 2019-06-03 DIAGNOSIS — Z79.4 LONG TERM (CURRENT) USE OF INSULIN: ICD-10-CM

## 2019-06-03 DIAGNOSIS — Z79.899 OTHER LONG TERM (CURRENT) DRUG THERAPY: ICD-10-CM

## 2019-06-03 DIAGNOSIS — Z88.8 ALLERGY STATUS TO OTHER DRUGS, MEDICAMENTS AND BIOLOGICAL SUBSTANCES: ICD-10-CM

## 2019-06-03 DIAGNOSIS — G81.91 HEMIPLEGIA, UNSPECIFIED AFFECTING RIGHT DOMINANT SIDE: ICD-10-CM

## 2019-06-03 DIAGNOSIS — E11.9 TYPE 2 DIABETES MELLITUS WITHOUT COMPLICATIONS: ICD-10-CM

## 2019-06-03 DIAGNOSIS — G56.91 UNSPECIFIED MONONEUROPATHY OF RIGHT UPPER LIMB: ICD-10-CM

## 2019-06-03 DIAGNOSIS — Z79.01 LONG TERM (CURRENT) USE OF ANTICOAGULANTS: ICD-10-CM

## 2019-06-03 DIAGNOSIS — Z91.013 ALLERGY TO SEAFOOD: ICD-10-CM

## 2019-06-03 DIAGNOSIS — R53.1 WEAKNESS: ICD-10-CM

## 2019-06-03 DIAGNOSIS — N17.9 ACUTE KIDNEY FAILURE, UNSPECIFIED: ICD-10-CM

## 2019-06-03 DIAGNOSIS — I48.0 PAROXYSMAL ATRIAL FIBRILLATION: ICD-10-CM

## 2019-08-05 ENCOUNTER — APPOINTMENT (OUTPATIENT)
Dept: ELECTROPHYSIOLOGY | Facility: CLINIC | Age: 84
End: 2019-08-05
Payer: MEDICARE

## 2019-08-05 PROCEDURE — 93296 REM INTERROG EVL PM/IDS: CPT

## 2019-08-05 PROCEDURE — 93294 REM INTERROG EVL PM/LDLS PM: CPT

## 2019-08-28 ENCOUNTER — APPOINTMENT (OUTPATIENT)
Dept: NEUROLOGY | Facility: CLINIC | Age: 84
End: 2019-08-28
Payer: MEDICARE

## 2019-08-28 VITALS
SYSTOLIC BLOOD PRESSURE: 138 MMHG | HEIGHT: 63.5 IN | HEART RATE: 60 BPM | DIASTOLIC BLOOD PRESSURE: 60 MMHG | BODY MASS INDEX: 38.85 KG/M2 | WEIGHT: 222 LBS

## 2019-08-28 DIAGNOSIS — R42 DIZZINESS AND GIDDINESS: ICD-10-CM

## 2019-08-28 DIAGNOSIS — G56.91 UNSPECIFIED MONONEUROPATHY OF RIGHT UPPER LIMB: ICD-10-CM

## 2019-08-28 DIAGNOSIS — Z00.00 ENCOUNTER FOR GENERAL ADULT MEDICAL EXAMINATION W/OUT ABNORMAL FINDINGS: ICD-10-CM

## 2019-08-28 PROCEDURE — 99214 OFFICE O/P EST MOD 30 MIN: CPT

## 2019-08-28 RX ORDER — LOSARTAN POTASSIUM 100 MG/1
100 TABLET, FILM COATED ORAL
Refills: 0 | Status: DISCONTINUED | COMMUNITY
End: 2019-08-28

## 2019-08-28 NOTE — DISCUSSION/SUMMARY
[FreeTextEntry1] : 89-year-old female with PMH remarkable for HTN, HLD, DM, PPM and atrial fibrillation, is on Coumadin, was admitted to  on 1/8/19 with 2 day history of dizziness associated with nausea, room spinning sensation, symptoms were exacerbated by postural head/neck moments, SBP was > 190, treated with hydralazine and antihypertensives, symptoms of dizziness improved during the hospital stay, CT scan of the head done was unremarkable for acute lesions, after discharge home, PT had occasional dizziness with postural head and neck changes, resolved with Antivert.\par \par # Right upper Mononeuropathy/no entrapment neuropathy/cervical radiculopathy\par \par - Continue occupational therapy\par - NCV studies of upper extre in 3-4 weeks\par \par # Most likely positional paroxysmal vertigo/peripheral vestibular dysfunction; resolved.\par \par # Carotid artery plaques B/L; s/p TAVR\par \par - Recommend  Lipitor in addition to Warfarin \par - may obtain CT angio later (Patient cannot have MRI  as she has a pacemaker)

## 2019-08-28 NOTE — DATA REVIEWED
[de-identified] : 1/8/19: CT head: No acute intracranial hemorrhage, mass effect, acute vascular territory infarction\par 1/9/19: Carotid Dopplers: Bilateral plaque without elevated velocities to suggest hemodynamic significant stenosis, however, given the degree of plaque CTA or MRA can be obtained

## 2019-08-28 NOTE — REVIEW OF SYSTEMS
[Poor Coordination] : poor coordination [Dizziness] : dizziness [Vertigo] : vertigo [Difficulty Walking] : difficulty walking [Shortness Of Breath] : shortness of breath [Negative] : Heme/Lymph [Hand Weakness] :  hand weakness [Numbness] : numbness [FreeTextEntry9] : knee arthritis / pain /LBP

## 2019-08-28 NOTE — HISTORY OF PRESENT ILLNESS
[FreeTextEntry1] : Patient is here for a followup visit today, was last seen in [facility] on 5/27/19 the patient had presented with right hand paresthesias and weakness. Patient's symptoms were thought to be no entrapment neuropathy rather than TIA, she was discharged home with the advice to have occupational therapy; patient has been attending occupational therapy twice weekly since she has noted near total resolution of right hand numbness and weakness.\par \par Patient has noted Resolution of dizziness, she underwent TAVR in 4/2019 at [facility] without any complication.\par \par Patient continues to be on Coumadin, in addition Lipitor was added

## 2019-08-28 NOTE — REASON FOR VISIT
[Post Hospitalization] : a post hospitalization visit [FreeTextEntry1] : for Right hand numbness/weakness

## 2019-08-28 NOTE — PHYSICAL EXAM
[General Appearance - Alert] : alert [General Appearance - In No Acute Distress] : in no acute distress [Oriented To Time, Place, And Person] : oriented to person, place, and time [Impaired Insight] : insight and judgment were intact [Affect] : the affect was normal [Person] : oriented to person [Place] : oriented to place [Time] : oriented to time [Visual Intact] : visual attention was ~T not ~L decreased [Concentration Intact] : normal concentrating ability [Naming Objects] : no difficulty naming common objects [Repeating Phrases] : no difficulty repeating a phrase [Writing A Sentence] : no difficulty writing a sentence [Comprehension] : comprehension intact [Fluency] : fluency intact [Reading] : reading intact [Past History] : adequate knowledge of personal past history [Cranial Nerves Oculomotor (III)] : extraocular motion intact [Cranial Nerves Optic (II)] : visual acuity intact bilaterally,  visual fields full to confrontation, pupils equal round and reactive to light [Cranial Nerves Trigeminal (V)] : facial sensation intact symmetrically [Cranial Nerves Vestibulocochlear (VIII)] : hearing was intact bilaterally [Cranial Nerves Facial (VII)] : face symmetrical [Cranial Nerves Accessory (XI - Cranial And Spinal)] : head turning and shoulder shrug symmetric [Cranial Nerves Glossopharyngeal (IX)] : tongue and palate midline [Cranial Nerves Hypoglossal (XII)] : there was no tongue deviation with protrusion [No Muscle Atrophy] : normal bulk in all four extremities [Sensation Tactile Decrease] : light touch was intact [Pain / Temp Decrease Distal Extremities (Glove & Stocking)] : diminished stocking/glove distribution [Proprioception] : proprioception was intact [Glove / Stocking] : decreased in a glove and stocking distribution [Non-ambulatory] : Non-ambulatory [2+] : Brachioradialis left 2+ [1+] : Patella left 1+ [0] : Ankle jerk left 0 [PERRL With Normal Accommodation] : pupils were equal in size, round, reactive to light, with normal accommodation [No APD] : no afferent pupillary defect [Extraocular Movements] : extraocular movements were intact [Full Visual Field] : full visual field [Outer Ear] : the ears and nose were normal in appearance [Hearing Threshold Finger Rub Not [place]] : hearing was normal [Neck Cervical Mass (___cm)] : no neck mass was observed [Exaggerated Use Of Accessory Muscles For Inspiration] : no accessory muscle use [Heart Rate And Rhythm] : heart rate was normal and rhythm regular [Heart Sounds] : normal S1 and S2 [Edema] : there was no peripheral edema [Arterial Pulses Carotid] : carotid pulses were normal with no bruits [No Spinal Tenderness] : no spinal tenderness [Involuntary Movements] : no involuntary movements were seen [] : no rash [FreeTextEntry1] : OBESE [Pain / Temp Decrease Hand Ulnar 1-1/2 Digits] : diminished right ulnar 1-1/2 digits [Past-pointing] : there was no past-pointing [Plantar Reflex Right Only] : normal on the right [Tremor] : no tremor present [Plantar Reflex Left Only] : normal on the left [FreeTextEntry6] : No pronator drift, B/L upper extremity strength 5/5, lower extremity 5-/5, no rigidity, no tremors, no hypertonia or tremors [FreeTextEntry8] : Uses a wheelchair for getting around

## 2019-09-26 ENCOUNTER — APPOINTMENT (OUTPATIENT)
Dept: NEUROLOGY | Facility: CLINIC | Age: 84
End: 2019-09-26
Payer: MEDICARE

## 2019-09-26 DIAGNOSIS — G62.9 POLYNEUROPATHY, UNSPECIFIED: ICD-10-CM

## 2019-09-26 PROCEDURE — 95886 MUSC TEST DONE W/N TEST COMP: CPT

## 2019-09-26 PROCEDURE — 99214 OFFICE O/P EST MOD 30 MIN: CPT | Mod: 25

## 2019-09-26 PROCEDURE — 95910 NRV CNDJ TEST 7-8 STUDIES: CPT

## 2019-09-26 NOTE — HISTORY OF PRESENT ILLNESS
[FreeTextEntry1] : Patient is here for to follow with her today, was last seen on 8/22/19. Patient was referred to occupational therapy for right hand weakness/paresthesias, she has attended several sessions, has noted Remarkable improvement of right hand weakness, she does admit that she experiences pain in the right elbow when performing movement with OT,  she is here for EMG/MCV studies today.\par \par Patient has not had anymore episodes of vertigo or dizziness, she complains of pain/numbness left leg.

## 2019-09-26 NOTE — DATA REVIEWED
[de-identified] : 1/8/19: CT head: No acute intracranial hemorrhage, mass effect, acute vascular territory infarction\par 1/9/19: Carotid Dopplers: Bilateral plaque without elevated velocities to suggest hemodynamic significant stenosis, however, given the degree of plaque CTA or MRA can be obtained

## 2019-09-26 NOTE — DISCUSSION/SUMMARY
[FreeTextEntry1] : 89-year-old female with PMH remarkable for HTN, HLD, DM, PPM and atrial fibrillation, is on Coumadin, was admitted to  on 1/8/19 with 2 day history of dizziness associated with nausea, room spinning sensation, symptoms were exacerbated by postural head/neck moments, SBP was > 190, treated with hydralazine and antihypertensives, symptoms of dizziness improved during the hospital stay, CT scan of the head done was unremarkable for acute lesions, after discharge home, PT had occasional dizziness with postural head and neck changes, resolved with Antivert.\par \par # Right ulnar neuropathy.\par \par - Continue occupational therapy, may refer to hand surgery later\par \par # Bilateral CTS; 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

## 2019-09-26 NOTE — REASON FOR VISIT
[Procedure: _________] : a [unfilled] procedure visit [FreeTextEntry1] : for EMG/NCV studies of upper extremities for evaluation of CTS/ cervical radic

## 2019-09-26 NOTE — REVIEW OF SYSTEMS
[Poor Coordination] : poor coordination [Numbness] : numbness [Dizziness] : dizziness [Vertigo] : vertigo [Difficulty Walking] : difficulty walking [Shortness Of Breath] : shortness of breath [Negative] : Heme/Lymph [Tingling] : tingling [Hand Weakness] : no hand weakness [FreeTextEntry9] : knee arthritis / pain /LBP

## 2019-09-26 NOTE — PROCEDURE
[FreeTextEntry1] : EMG/NCV studies of the upper extremities were performed today.\par \par This study is abnormal.\par \par The electrophysiological findings are consistent with ulnar neuropathy at / across right elbow; in addition, there is evidence of mild-moderate median neuropathy at the wrists; left > right side. \par \par There is also evidence of sensory neuropathy in upper extremities; most likely related to diabetes.\par \par There is no evidence of acute cervical radiculopathy on the right side.

## 2019-11-19 ENCOUNTER — APPOINTMENT (OUTPATIENT)
Dept: NEUROLOGY | Facility: CLINIC | Age: 84
End: 2019-11-19
Payer: MEDICARE

## 2019-11-19 VITALS — HEART RATE: 65 BPM | DIASTOLIC BLOOD PRESSURE: 80 MMHG | SYSTOLIC BLOOD PRESSURE: 164 MMHG | HEIGHT: 63.5 IN

## 2019-11-19 DIAGNOSIS — G56.21 LESION OF ULNAR NERVE, RIGHT UPPER LIMB: ICD-10-CM

## 2019-11-19 DIAGNOSIS — G56.03 CARPAL TUNNEL SYNDROM,BILATERAL UPPER LIMBS: ICD-10-CM

## 2019-11-19 PROCEDURE — 99214 OFFICE O/P EST MOD 30 MIN: CPT

## 2019-11-19 NOTE — DISCUSSION/SUMMARY
[FreeTextEntry1] : 89-year-old female with PMH remarkable for HTN, HLD, DM, PPM and atrial fibrillation, is on Coumadin, was admitted to  on 1/8/19 with 2 day history of dizziness associated with nausea, room spinning sensation, symptoms were exacerbated by postural head/neck moments, SBP was > 190, treated with hydralazine and antihypertensives, symptoms of dizziness improved during the hospital stay, CT scan of the head done was unremarkable for acute lesions, after discharge home, PT had occasional dizziness with postural head and neck changes, resolved with Antivert.\par \par # Right ulnar neuropathy.\par \par - Continue occupational therapy 4-6 weeks, if sx persist then will  refer to hand surgery\par \par # Bilateral CTS; 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

## 2019-11-19 NOTE — DATA REVIEWED
[de-identified] : 1/8/19: CT head: No acute intracranial hemorrhage, mass effect, acute vascular territory infarction\par 1/9/19: Carotid Dopplers: B/L plaque without elevated velocities to suggest hemodynamic stenosis, given degree of plaque CTA/ MRA can be obtained\par 9/26/19: EMG/NCV studies of the upper extremities. This study is abnormal. The electrophysiological findings are consistent with ulnar neuropathy at / across right elbow; in addition, there is evidence of mild-moderate median neuropathy at the wrists; left > right side. There is also evidence of sensory neuropathy in upper extremities; most likely related to diabetes. There is no evidence of acute cervical radiculopathy on the right side.

## 2019-11-19 NOTE — HISTORY OF PRESENT ILLNESS
[FreeTextEntry1] : Patient is here for to follow with her today, was last seen on 9/26/19. Patient attended 4 sessions of occupational therapy for right hand weakness/paresthesias, she  has noted remarkable improvement of right hand weakness, is able to perform Fine motor movements, she has occasional tingly sensation in the right elbow, She is using the wrist splint at night. Denies neck pain or pain radiating from the neck into the arms.\par \par Patient has not had anymore episodes of vertigo or dizziness, she complains of pain/numbness left leg.

## 2019-11-19 NOTE — REVIEW OF SYSTEMS
[Poor Coordination] : poor coordination [Numbness] : numbness [Dizziness] : dizziness [Tingling] : tingling [Vertigo] : vertigo [Shortness Of Breath] : shortness of breath [Difficulty Walking] : difficulty walking [Negative] : Heme/Lymph [Hand Weakness] :  hand weakness [FreeTextEntry9] : knee arthritis / pain /LBP

## 2019-11-19 NOTE — PHYSICAL EXAM
[General Appearance - Alert] : alert [General Appearance - In No Acute Distress] : in no acute distress [Oriented To Time, Place, And Person] : oriented to person, place, and time [Impaired Insight] : insight and judgment were intact [Affect] : the affect was normal [Person] : oriented to person [Place] : oriented to place [Concentration Intact] : normal concentrating ability [Time] : oriented to time [Visual Intact] : visual attention was ~T not ~L decreased [Naming Objects] : no difficulty naming common objects [Repeating Phrases] : no difficulty repeating a phrase [Writing A Sentence] : no difficulty writing a sentence [Fluency] : fluency intact [Comprehension] : comprehension intact [Reading] : reading intact [Past History] : adequate knowledge of personal past history [Cranial Nerves Optic (II)] : visual acuity intact bilaterally,  visual fields full to confrontation, pupils equal round and reactive to light [Cranial Nerves Oculomotor (III)] : extraocular motion intact [Cranial Nerves Trigeminal (V)] : facial sensation intact symmetrically [Cranial Nerves Glossopharyngeal (IX)] : tongue and palate midline [Cranial Nerves Facial (VII)] : face symmetrical [Cranial Nerves Vestibulocochlear (VIII)] : hearing was intact bilaterally [Cranial Nerves Hypoglossal (XII)] : there was no tongue deviation with protrusion [Cranial Nerves Accessory (XI - Cranial And Spinal)] : head turning and shoulder shrug symmetric [Sensation Tactile Decrease] : light touch was intact [No Muscle Atrophy] : normal bulk in all four extremities [Proprioception] : proprioception was intact [Pain / Temp Decrease Hand Ulnar 1-1/2 Digits] : diminished right ulnar 1-1/2 digits [Pain / Temp Decrease Distal Extremities (Glove & Stocking)] : diminished stocking/glove distribution [Glove / Stocking] : decreased in a glove and stocking distribution [Non-ambulatory] : Non-ambulatory [2+] : Brachioradialis left 2+ [1+] : Patella left 1+ [0] : Ankle jerk left 0 [PERRL With Normal Accommodation] : pupils were equal in size, round, reactive to light, with normal accommodation [Extraocular Movements] : extraocular movements were intact [No APD] : no afferent pupillary defect [Full Visual Field] : full visual field [Outer Ear] : the ears and nose were normal in appearance [Hearing Threshold Finger Rub Not [place]] : hearing was normal [] : the neck was supple [Neck Cervical Mass (___cm)] : no neck mass was observed [Heart Rate And Rhythm] : heart rate was normal and rhythm regular [Heart Sounds] : normal S1 and S2 [Arterial Pulses Carotid] : carotid pulses were normal with no bruits [Edema] : there was no peripheral edema [FreeTextEntry1] : OBESE [Past-pointing] : there was no past-pointing [Tremor] : no tremor present [Plantar Reflex Right Only] : normal on the right [Plantar Reflex Left Only] : normal on the left [FreeTextEntry6] : No pronator drift, B/L upper extremity strength 5/5, lower extremity 5-/5, no rigidity, no tremors, no hypertonia or tremors [FreeTextEntry8] : Uses a wheelchair for getting around

## 2020-02-18 ENCOUNTER — APPOINTMENT (OUTPATIENT)
Dept: ELECTROPHYSIOLOGY | Facility: CLINIC | Age: 85
End: 2020-02-18
Payer: MEDICARE

## 2020-02-18 PROCEDURE — 93294 REM INTERROG EVL PM/LDLS PM: CPT

## 2020-02-18 PROCEDURE — 93296 REM INTERROG EVL PM/IDS: CPT

## 2020-04-22 ENCOUNTER — APPOINTMENT (OUTPATIENT)
Dept: NEUROLOGY | Facility: CLINIC | Age: 85
End: 2020-04-22

## 2020-05-12 RX ORDER — MECLIZINE HYDROCHLORIDE 25 MG/1
25 TABLET ORAL DAILY
Refills: 0 | Status: ACTIVE | COMMUNITY

## 2020-05-12 RX ORDER — INSULIN GLARGINE 100 [IU]/ML
INJECTION, SOLUTION SUBCUTANEOUS
Refills: 0 | Status: ACTIVE | COMMUNITY

## 2020-05-13 ENCOUNTER — APPOINTMENT (OUTPATIENT)
Dept: ELECTROPHYSIOLOGY | Facility: CLINIC | Age: 85
End: 2020-05-13

## 2020-05-15 ENCOUNTER — APPOINTMENT (OUTPATIENT)
Dept: ELECTROPHYSIOLOGY | Facility: CLINIC | Age: 85
End: 2020-05-15
Payer: MEDICARE

## 2020-05-15 VITALS
DIASTOLIC BLOOD PRESSURE: 61 MMHG | HEART RATE: 62 BPM | TEMPERATURE: 98 F | HEIGHT: 63.5 IN | WEIGHT: 206 LBS | BODY MASS INDEX: 36.05 KG/M2 | SYSTOLIC BLOOD PRESSURE: 158 MMHG | OXYGEN SATURATION: 99 %

## 2020-05-15 PROCEDURE — 99205 OFFICE O/P NEW HI 60 MIN: CPT

## 2020-05-15 PROCEDURE — 93280 PM DEVICE PROGR EVAL DUAL: CPT

## 2020-05-15 NOTE — DISCUSSION/SUMMARY
[FreeTextEntry1] : 89-year-old female with history of hypertension diabetes neuropathy sick sinus syndrome paroxysmal A. fib status post dual-chamber pacemaker since 7/2009.  Device interrogation shows SR 60 bpm, no AF episodes recently, battery life 10 months. Lead testing threshold, sensing, impedance wnl and stable in RA and RV. RA pacing 30%, RV pacing minimal\par Continue COumadin and metoprolol\par continue remote monitoring and device check in office in 6 months\par follow with Dr Weaver \par

## 2020-05-15 NOTE — REASON FOR VISIT
[Consultation] : a consultation regarding [Sick Sinus Syndrome] : sick sinus syndrome [FreeTextEntry1] : ref Owen

## 2020-05-15 NOTE — HISTORY OF PRESENT ILLNESS
[FreeTextEntry1] : 89-year-old female with history of hypertension diabetes neuropathy sick sinus syndrome paroxysmal A. fib status post dual-chamber pacemaker since 7/2009, TAVR 4/2019.  Patient is in Coumadin for A. fib.  Chads vascular score is 5. She denies any symptoms, minimal walking due to spinal stenosis.

## 2020-05-15 NOTE — PHYSICAL EXAM
[Normal Appearance] : normal appearance [Normal Oral Mucosa] : normal oral mucosa [Heart Rate And Rhythm] : heart rate and rhythm were normal [Heart Sounds] : normal S1 and S2 [] : no respiratory distress [Bowel Sounds] : normal bowel sounds [Abnormal Walk] : normal gait [Oriented To Time, Place, And Person] : oriented to person, place, and time

## 2020-05-15 NOTE — REVIEW OF SYSTEMS
[Dyspnea on exertion] : dyspnea during exertion [Negative] : Heme/Lymph [Chest Pain] : no chest pain [Shortness Of Breath] : no shortness of breath [Palpitations] : no palpitations

## 2020-08-13 ENCOUNTER — APPOINTMENT (OUTPATIENT)
Dept: ELECTROPHYSIOLOGY | Facility: CLINIC | Age: 85
End: 2020-08-13
Payer: MEDICARE

## 2020-08-14 PROCEDURE — 93296 REM INTERROG EVL PM/IDS: CPT

## 2020-08-14 PROCEDURE — 93294 REM INTERROG EVL PM/LDLS PM: CPT

## 2020-11-16 ENCOUNTER — NON-APPOINTMENT (OUTPATIENT)
Age: 85
End: 2020-11-16

## 2020-11-16 ENCOUNTER — APPOINTMENT (OUTPATIENT)
Dept: ELECTROPHYSIOLOGY | Facility: CLINIC | Age: 85
End: 2020-11-16
Payer: MEDICARE

## 2020-11-16 VITALS
HEART RATE: 70 BPM | SYSTOLIC BLOOD PRESSURE: 170 MMHG | BODY MASS INDEX: 38.67 KG/M2 | HEIGHT: 63.5 IN | OXYGEN SATURATION: 100 % | DIASTOLIC BLOOD PRESSURE: 70 MMHG | WEIGHT: 221 LBS

## 2020-11-16 PROCEDURE — 93000 ELECTROCARDIOGRAM COMPLETE: CPT | Mod: LT,59

## 2020-11-16 PROCEDURE — 99215 OFFICE O/P EST HI 40 MIN: CPT

## 2020-11-16 PROCEDURE — 93280 PM DEVICE PROGR EVAL DUAL: CPT

## 2020-11-16 RX ORDER — METOPROLOL TARTRATE 50 MG/1
50 TABLET, FILM COATED ORAL
Refills: 0 | Status: DISCONTINUED | COMMUNITY
End: 2020-11-16

## 2020-11-16 RX ORDER — FLUTICASONE PROPIONATE 50 UG/1
50 SPRAY, METERED NASAL
Refills: 0 | Status: DISCONTINUED | COMMUNITY
End: 2020-11-16

## 2020-11-16 NOTE — PHYSICAL EXAM
[Normal Appearance] : normal appearance [Normal Oral Mucosa] : normal oral mucosa [] : no respiratory distress [Heart Rate And Rhythm] : heart rate and rhythm were normal [Heart Sounds] : normal S1 and S2 [Bowel Sounds] : normal bowel sounds [Abnormal Walk] : normal gait [Oriented To Time, Place, And Person] : oriented to person, place, and time

## 2020-11-16 NOTE — HISTORY OF PRESENT ILLNESS
[FreeTextEntry1] : 89-year-old female with history of hypertension diabetes neuropathy sick sinus syndrome paroxysmal A. fib status post dual-chamber pacemaker MDT since 7/2009, TAVR 4/2019.  Patient is in Coumadin for A. fib.  Chads vascular score is 5. She does minimal walking due to spinal stenosis. She has mild exertional dyspnea. Denies chest pain, syncope, dizziness.

## 2020-11-16 NOTE — REVIEW OF SYSTEMS
[Dyspnea on exertion] : dyspnea during exertion [Negative] : Heme/Lymph [Shortness Of Breath] : no shortness of breath [Chest Pain] : no chest pain [Palpitations] : no palpitations

## 2020-11-16 NOTE — DISCUSSION/SUMMARY
[FreeTextEntry1] : 89-year-old female with history of hypertension diabetes neuropathy sick sinus syndrome paroxysmal A. fib status post dual-chamber pacemaker MDT since 7/2009.  \par Device interrogation shows SR bpm, no AF episodes recently, battery life 1 month. Lead testing threshold, sensing, impedance wnl and stable in RA and RV. RA pacing 44%, RV pacing minimal\par will schedule PPM generator change, keep INR 2-2.5 and discussed i/r/b/a of PPM gen change and all questions were answered\par Continue COumadin and metoprolol, diltiazem \par continue remote monitoring and device check in office in 6 months\par follow with Dr Weaver \par

## 2020-12-11 ENCOUNTER — RESULT REVIEW (OUTPATIENT)
Age: 85
End: 2020-12-11

## 2020-12-11 ENCOUNTER — OUTPATIENT (OUTPATIENT)
Dept: OUTPATIENT SERVICES | Facility: HOSPITAL | Age: 85
LOS: 1 days | End: 2020-12-11
Payer: MEDICARE

## 2020-12-11 VITALS
RESPIRATION RATE: 16 BRPM | WEIGHT: 223.77 LBS | HEART RATE: 60 BPM | DIASTOLIC BLOOD PRESSURE: 52 MMHG | HEIGHT: 63.5 IN | OXYGEN SATURATION: 99 % | SYSTOLIC BLOOD PRESSURE: 146 MMHG | TEMPERATURE: 97 F

## 2020-12-11 DIAGNOSIS — Z90.49 ACQUIRED ABSENCE OF OTHER SPECIFIED PARTS OF DIGESTIVE TRACT: Chronic | ICD-10-CM

## 2020-12-11 DIAGNOSIS — Z90.710 ACQUIRED ABSENCE OF BOTH CERVIX AND UTERUS: Chronic | ICD-10-CM

## 2020-12-11 DIAGNOSIS — I49.5 SICK SINUS SYNDROME: ICD-10-CM

## 2020-12-11 DIAGNOSIS — Z95.0 PRESENCE OF CARDIAC PACEMAKER: Chronic | ICD-10-CM

## 2020-12-11 DIAGNOSIS — Z01.818 ENCOUNTER FOR OTHER PREPROCEDURAL EXAMINATION: ICD-10-CM

## 2020-12-11 DIAGNOSIS — Z95.2 PRESENCE OF PROSTHETIC HEART VALVE: Chronic | ICD-10-CM

## 2020-12-11 LAB
A1C WITH ESTIMATED AVERAGE GLUCOSE RESULT: 6.9 % — HIGH (ref 4–5.6)
ANION GAP SERPL CALC-SCNC: 4 MMOL/L — LOW (ref 5–17)
BASOPHILS # BLD AUTO: 0.06 K/UL — SIGNIFICANT CHANGE UP (ref 0–0.2)
BASOPHILS NFR BLD AUTO: 0.6 % — SIGNIFICANT CHANGE UP (ref 0–2)
BUN SERPL-MCNC: 24 MG/DL — HIGH (ref 7–23)
CALCIUM SERPL-MCNC: 9.2 MG/DL — SIGNIFICANT CHANGE UP (ref 8.5–10.1)
CHLORIDE SERPL-SCNC: 109 MMOL/L — HIGH (ref 96–108)
CO2 SERPL-SCNC: 29 MMOL/L — SIGNIFICANT CHANGE UP (ref 22–31)
CREAT SERPL-MCNC: 1.16 MG/DL — SIGNIFICANT CHANGE UP (ref 0.5–1.3)
EOSINOPHIL # BLD AUTO: 0.18 K/UL — SIGNIFICANT CHANGE UP (ref 0–0.5)
EOSINOPHIL NFR BLD AUTO: 1.7 % — SIGNIFICANT CHANGE UP (ref 0–6)
ESTIMATED AVERAGE GLUCOSE: 151 MG/DL — HIGH (ref 68–114)
GLUCOSE SERPL-MCNC: 112 MG/DL — HIGH (ref 70–99)
HCT VFR BLD CALC: 41.2 % — SIGNIFICANT CHANGE UP (ref 34.5–45)
HGB BLD-MCNC: 13 G/DL — SIGNIFICANT CHANGE UP (ref 11.5–15.5)
IMM GRANULOCYTES NFR BLD AUTO: 0.6 % — SIGNIFICANT CHANGE UP (ref 0–1.5)
INR BLD: 2.16 RATIO — HIGH (ref 0.88–1.16)
LYMPHOCYTES # BLD AUTO: 1.45 K/UL — SIGNIFICANT CHANGE UP (ref 1–3.3)
LYMPHOCYTES # BLD AUTO: 14.1 % — SIGNIFICANT CHANGE UP (ref 13–44)
MCHC RBC-ENTMCNC: 28.1 PG — SIGNIFICANT CHANGE UP (ref 27–34)
MCHC RBC-ENTMCNC: 31.6 GM/DL — LOW (ref 32–36)
MCV RBC AUTO: 89.2 FL — SIGNIFICANT CHANGE UP (ref 80–100)
MONOCYTES # BLD AUTO: 0.74 K/UL — SIGNIFICANT CHANGE UP (ref 0–0.9)
MONOCYTES NFR BLD AUTO: 7.2 % — SIGNIFICANT CHANGE UP (ref 2–14)
NEUTROPHILS # BLD AUTO: 7.8 K/UL — HIGH (ref 1.8–7.4)
NEUTROPHILS NFR BLD AUTO: 75.8 % — SIGNIFICANT CHANGE UP (ref 43–77)
PLATELET # BLD AUTO: 128 K/UL — LOW (ref 150–400)
POTASSIUM SERPL-MCNC: 5.3 MMOL/L — SIGNIFICANT CHANGE UP (ref 3.5–5.3)
POTASSIUM SERPL-SCNC: 5.3 MMOL/L — SIGNIFICANT CHANGE UP (ref 3.5–5.3)
PROTHROM AB SERPL-ACNC: 24.2 SEC — HIGH (ref 10.6–13.6)
RBC # BLD: 4.62 M/UL — SIGNIFICANT CHANGE UP (ref 3.8–5.2)
RBC # FLD: 15.5 % — HIGH (ref 10.3–14.5)
SARS-COV-2 RNA SPEC QL NAA+PROBE: SIGNIFICANT CHANGE UP
SODIUM SERPL-SCNC: 142 MMOL/L — SIGNIFICANT CHANGE UP (ref 135–145)
WBC # BLD: 10.29 K/UL — SIGNIFICANT CHANGE UP (ref 3.8–10.5)
WBC # FLD AUTO: 10.29 K/UL — SIGNIFICANT CHANGE UP (ref 3.8–10.5)

## 2020-12-11 PROCEDURE — 80048 BASIC METABOLIC PNL TOTAL CA: CPT

## 2020-12-11 PROCEDURE — 93010 ELECTROCARDIOGRAM REPORT: CPT

## 2020-12-11 PROCEDURE — 83036 HEMOGLOBIN GLYCOSYLATED A1C: CPT

## 2020-12-11 PROCEDURE — 36415 COLL VENOUS BLD VENIPUNCTURE: CPT

## 2020-12-11 PROCEDURE — 85025 COMPLETE CBC W/AUTO DIFF WBC: CPT

## 2020-12-11 PROCEDURE — 71046 X-RAY EXAM CHEST 2 VIEWS: CPT

## 2020-12-11 PROCEDURE — U0003: CPT

## 2020-12-11 PROCEDURE — 71046 X-RAY EXAM CHEST 2 VIEWS: CPT | Mod: 26

## 2020-12-11 PROCEDURE — 93005 ELECTROCARDIOGRAM TRACING: CPT

## 2020-12-11 PROCEDURE — G0463: CPT | Mod: 25

## 2020-12-11 PROCEDURE — 85610 PROTHROMBIN TIME: CPT

## 2020-12-11 RX ORDER — LOSARTAN POTASSIUM 100 MG/1
1 TABLET, FILM COATED ORAL
Qty: 0 | Refills: 0 | DISCHARGE

## 2020-12-11 RX ORDER — FLUTICASONE PROPIONATE 50 MCG
1 SPRAY, SUSPENSION NASAL
Qty: 0 | Refills: 0 | DISCHARGE

## 2020-12-11 NOTE — H&P PST ADULT - ASSESSMENT
89 year old female with SSS PAF s/p PPM 2009 battery at end of life ; she presents to PST for planned PPM generator change 89 year old female with SSS PAF s/p PPM 2009 battery at end of life ; she presents to PST for planned PPM generator change   1.  Dr Dover  office  will instruct patient regarding   medication regimen on day of procedure.  2. Endoscopy booking will call patient the day before surgery for arrival instructions   3. NPO post midnight  4. CBC BMP EKG as per Dr Dover   5. Patient instructed to have responsible adult accompany/ drive pt home after procedure 89 year old female with SSS PAF s/p PPM 2009 battery at end of life ; she presents to PST for planned PPM generator change   1.  Dr Dover  office  will instruct patient regarding   medication regimen on day of procedure.  2. Endoscopy booking will call patient the day before surgery for arrival instructions   3. NPO post midnight  4. CBC BMP EKG as per Dr Dover   5. Patient instructed to have responsible adult accompany/ drive pt home after procedure  6. MRSA swab not done NP EJ aware

## 2020-12-11 NOTE — H&P PST ADULT - HEALTH CARE MAINTENANCE
flu vaccine current   [place] Pt denies  travel internationally for the past 21 days

## 2020-12-11 NOTE — H&P PST ADULT - NSICDXPASTMEDICALHX_GEN_ALL_CORE_FT
PAST MEDICAL HISTORY:  Arthritis     AV block, 2nd degree     Carotid atherosclerosis bilateral    Carpal tunnel syndrome     HLD (hyperlipidemia)     HTN (hypertension)     GIACOMO (obstructive sleep apnea)     Paroxysmal atrial fibrillation     Sensory neuropathy     Sick sinus syndrome     Spinal stenosis     Type 2 diabetes mellitus with complication, with long-term current use of insulin     Vertigo

## 2020-12-11 NOTE — H&P PST ADULT - NSICDXPASTSURGICALHX_GEN_ALL_CORE_FT
PAST SURGICAL HISTORY:  Artificial cardiac pacemaker 6/ 2009    H/O: hysterectomy due to fibroids    History of appendectomy     History of cholecystectomy     History of transcatheter aortic valve replacement (TAVR) 4/2019

## 2020-12-11 NOTE — H&P PST ADULT - HISTORY OF PRESENT ILLNESS
89 year old female 89 year old female with SSS PAF s/p PPM 2009 battery at end of life ; she presents to PST for planned PPM generator change

## 2020-12-12 DIAGNOSIS — Z01.818 ENCOUNTER FOR OTHER PREPROCEDURAL EXAMINATION: ICD-10-CM

## 2020-12-12 DIAGNOSIS — I49.5 SICK SINUS SYNDROME: ICD-10-CM

## 2020-12-14 ENCOUNTER — OUTPATIENT (OUTPATIENT)
Dept: OUTPATIENT SERVICES | Facility: HOSPITAL | Age: 85
LOS: 1 days | Discharge: HOME | End: 2020-12-14
Payer: MEDICARE

## 2020-12-14 VITALS
RESPIRATION RATE: 18 BRPM | OXYGEN SATURATION: 98 % | HEART RATE: 62 BPM | DIASTOLIC BLOOD PRESSURE: 67 MMHG | SYSTOLIC BLOOD PRESSURE: 174 MMHG

## 2020-12-14 VITALS
SYSTOLIC BLOOD PRESSURE: 183 MMHG | HEIGHT: 63 IN | DIASTOLIC BLOOD PRESSURE: 46 MMHG | OXYGEN SATURATION: 99 % | TEMPERATURE: 97 F | RESPIRATION RATE: 15 BRPM | HEART RATE: 62 BPM | WEIGHT: 223.11 LBS

## 2020-12-14 DIAGNOSIS — Z95.2 PRESENCE OF PROSTHETIC HEART VALVE: Chronic | ICD-10-CM

## 2020-12-14 DIAGNOSIS — Z90.710 ACQUIRED ABSENCE OF BOTH CERVIX AND UTERUS: Chronic | ICD-10-CM

## 2020-12-14 DIAGNOSIS — Z95.0 PRESENCE OF CARDIAC PACEMAKER: Chronic | ICD-10-CM

## 2020-12-14 DIAGNOSIS — I49.5 SICK SINUS SYNDROME: ICD-10-CM

## 2020-12-14 DIAGNOSIS — Z90.49 ACQUIRED ABSENCE OF OTHER SPECIFIED PARTS OF DIGESTIVE TRACT: Chronic | ICD-10-CM

## 2020-12-14 PROCEDURE — 82962 GLUCOSE BLOOD TEST: CPT

## 2020-12-14 PROCEDURE — C1785: CPT

## 2020-12-14 PROCEDURE — 33228 REMV&REPLC PM GEN DUAL LEAD: CPT | Mod: KX

## 2020-12-14 PROCEDURE — C1889: CPT

## 2020-12-14 RX ORDER — CEFAZOLIN SODIUM 1 G
2000 VIAL (EA) INJECTION ONCE
Refills: 0 | Status: DISCONTINUED | OUTPATIENT
Start: 2020-12-14 | End: 2020-12-14

## 2020-12-14 RX ORDER — CEPHALEXIN 500 MG
500 CAPSULE ORAL EVERY 8 HOURS
Refills: 0 | Status: DISCONTINUED | OUTPATIENT
Start: 2020-12-14 | End: 2020-12-14

## 2020-12-14 NOTE — ASU PATIENT PROFILE, ADULT - PSH
Artificial cardiac pacemaker  6/ 2009  H/O: hysterectomy  due to fibroids  History of appendectomy    History of cholecystectomy    History of transcatheter aortic valve replacement (TAVR)  4/2019

## 2020-12-14 NOTE — ASU PATIENT PROFILE, ADULT - PMH
Arthritis    AV block, 2nd degree    Carotid atherosclerosis  bilateral  Carpal tunnel syndrome    HLD (hyperlipidemia)    HTN (hypertension)    GIACOMO (obstructive sleep apnea)    Paroxysmal atrial fibrillation    Sensory neuropathy    Sick sinus syndrome    Spinal stenosis    Type 2 diabetes mellitus with complication, with long-term current use of insulin    Vertigo

## 2020-12-15 DIAGNOSIS — Z95.4 PRESENCE OF OTHER HEART-VALVE REPLACEMENT: ICD-10-CM

## 2020-12-15 DIAGNOSIS — I65.23 OCCLUSION AND STENOSIS OF BILATERAL CAROTID ARTERIES: ICD-10-CM

## 2020-12-15 DIAGNOSIS — Z87.891 PERSONAL HISTORY OF NICOTINE DEPENDENCE: ICD-10-CM

## 2020-12-15 DIAGNOSIS — Z88.6 ALLERGY STATUS TO ANALGESIC AGENT: ICD-10-CM

## 2020-12-15 DIAGNOSIS — I49.5 SICK SINUS SYNDROME: ICD-10-CM

## 2020-12-15 DIAGNOSIS — Z45.010 ENCOUNTER FOR CHECKING AND TESTING OF CARDIAC PACEMAKER PULSE GENERATOR [BATTERY]: ICD-10-CM

## 2020-12-15 DIAGNOSIS — Z79.01 LONG TERM (CURRENT) USE OF ANTICOAGULANTS: ICD-10-CM

## 2020-12-15 DIAGNOSIS — I44.1 ATRIOVENTRICULAR BLOCK, SECOND DEGREE: ICD-10-CM

## 2020-12-15 DIAGNOSIS — G47.33 OBSTRUCTIVE SLEEP APNEA (ADULT) (PEDIATRIC): ICD-10-CM

## 2020-12-15 DIAGNOSIS — Z79.4 LONG TERM (CURRENT) USE OF INSULIN: ICD-10-CM

## 2020-12-15 DIAGNOSIS — I10 ESSENTIAL (PRIMARY) HYPERTENSION: ICD-10-CM

## 2020-12-15 DIAGNOSIS — E11.40 TYPE 2 DIABETES MELLITUS WITH DIABETIC NEUROPATHY, UNSPECIFIED: ICD-10-CM

## 2020-12-29 ENCOUNTER — APPOINTMENT (OUTPATIENT)
Dept: ELECTROPHYSIOLOGY | Facility: CLINIC | Age: 85
End: 2020-12-29
Payer: MEDICARE

## 2020-12-29 VITALS — SYSTOLIC BLOOD PRESSURE: 128 MMHG | DIASTOLIC BLOOD PRESSURE: 70 MMHG

## 2020-12-29 VITALS — HEIGHT: 63.5 IN | HEART RATE: 65 BPM | OXYGEN SATURATION: 99 %

## 2020-12-29 PROBLEM — M19.90 UNSPECIFIED OSTEOARTHRITIS, UNSPECIFIED SITE: Chronic | Status: ACTIVE | Noted: 2020-12-11

## 2020-12-29 PROBLEM — I44.1 ATRIOVENTRICULAR BLOCK, SECOND DEGREE: Chronic | Status: ACTIVE | Noted: 2020-12-11

## 2020-12-29 PROBLEM — I49.5 SICK SINUS SYNDROME: Chronic | Status: ACTIVE | Noted: 2020-12-11

## 2020-12-29 PROBLEM — G62.9 POLYNEUROPATHY, UNSPECIFIED: Chronic | Status: ACTIVE | Noted: 2020-12-11

## 2020-12-29 PROBLEM — G56.00 CARPAL TUNNEL SYNDROME, UNSPECIFIED UPPER LIMB: Chronic | Status: ACTIVE | Noted: 2020-12-11

## 2020-12-29 PROBLEM — G47.33 OBSTRUCTIVE SLEEP APNEA (ADULT) (PEDIATRIC): Chronic | Status: ACTIVE | Noted: 2020-12-11

## 2020-12-29 PROBLEM — E78.5 HYPERLIPIDEMIA, UNSPECIFIED: Chronic | Status: ACTIVE | Noted: 2020-12-11

## 2020-12-29 PROBLEM — M48.00 SPINAL STENOSIS, SITE UNSPECIFIED: Chronic | Status: ACTIVE | Noted: 2020-12-11

## 2020-12-29 PROBLEM — R42 DIZZINESS AND GIDDINESS: Chronic | Status: ACTIVE | Noted: 2020-12-11

## 2020-12-29 PROBLEM — I65.29 OCCLUSION AND STENOSIS OF UNSPECIFIED CAROTID ARTERY: Chronic | Status: ACTIVE | Noted: 2020-12-11

## 2020-12-29 PROCEDURE — 99024 POSTOP FOLLOW-UP VISIT: CPT

## 2021-01-01 NOTE — ASU PREOP CHECKLIST - AS BP NONINV SITE
"Progress Note NOTE    Patient name: Ryan Carroll  MRN: [id number]  Mother:  Chapo Carroll    Gestational Age: 38w2d male now 38w 4d on DOL# 2 days    Delivery Clinician:  NORMA WILLINGHAM/FP: [facility] [place] [facility] (Mandeep Awad)    PRENATAL / BIRTH HISTORY / DELIVERY   ROM on 2021 at 12:40 PM; Clear   Infant delivered on 2021 at 12:42 PM    Gestational Age: 38w2d term male born by  Repeat  Section to a 23 y.o.   . AROM x 0h 02m . Amniotic fluid was Clear. Cord Information: 3 vessels; Complications: Nuchal. MBT: A+ prenatal labs negative, GBS negative, and prenatal ultrasounds reviewed and normal. Pregnancy complicated by thc use + UDS on admission 21 and 20, anemia and late prenatal care. Mother received  cefazolin during pregnancy and/or labor. Resuscitation at delivery: Suctioning;Tactile Stimulation. Apgars: 8  and 9 .    Mother's COVID-19 results: Negative    VITAL SIGNS & PHYSICAL EXAM:   Birth Wt: 7 lb 6.2 oz (3350 g) T: 98.2 °F (36.8 °C) (Axillary)  HR: 142   RR: 48        Current Weight:    Weight: 3235 g (7 lb 2.1 oz)    Birth Length: 20       Change in weight since birth: -3% Birth Head circumference: Head Circumference: 36.5 cm (14.37\")                  NORMAL  EXAMINATION    UNLESS OTHERWISE NOTED EXCEPTIONS    (AS NOTED)   General/Neuro   In no apparent distress, appears c/w EGA  Exam/reflexes appropriate for age and gestation None   Skin   Clear w/o abnormal rash, jaundice or lesions  Normal perfusion and peripheral pulses Marshallese spot on sacrum   HEENT   Normocephalic w/ nl sutures, eyes open.  RR:red reflex present bilaterally, conjunctiva without erythema, no drainage, sclera white, and no edema  ENT patent w/o obvious defects + s/p frenotomy   Chest   In no apparent respiratory distress  CTA / RRR. No Murmur None   Abdomen/Genitalia   Soft, nondistended w/o organomegaly  Normal appearance for gender and "
[de-identified] : Pt is a 42 y/o F 3 mo s/p VSG who presents today feeling well. Pt states she is doing good at home, tolerating her diet consisting of good protein sources. Pt states she is blending fruits and vegetables, advised pt to eat the whole foods for the fiber. States she is exercising 6 days a week, in the gym and walking at home. States she is taking her vitamins at home. Denies n,v,c,d. States she has lost almost 50 lbs since surgery. 
gestation  normal male, uncircumcised and testes descended   Trunk  Spine  Extremities Straight w/o obvious defects  Active, mobile without deformity none     RECOGNIZED PROBLEMS & IMMEDIATE PLAN(S) OF CARE:     Patient Active Problem List    Diagnosis Date Noted   • *Single liveborn infant, delivered by  2021   •  affected by maternal use of cannabis 2021     Note Last Updated: 2021     Mother's UDS positive for THC on admission- 21 and 20  Infant UDS negative  SW consult completed- cleared to d/c home with Mom.  Plan: cord tox pending SW will follow results  ------------------------------------------------------------------------------       • Tongue tied 2021     Note Last Updated: 2021     frenotomy preformed 21  ------------------------------------------------------------------------------           INTAKE AND OUTPUT     Feeding: breastfeeding and supplementing with formula- Infant has started breast feeding better today after frenotomy    The following education was discussed with Mom:  Using marijuana while breastfeeding can allow harmful chemicals to pass from the mother to the infant through breast milk or secondhand smoke exposure.  The chemicals have the potential to affect a variety of neurodevelopmental processes in the infant. To limit potential risk to the infant breastfeeding mothers should not use marijuana or products containing CBD in any form while breastfeeding. Per the American Academy of Pediatrics.      Intake & Output (last day)        07 -  0700  07 -  0700    P.O. 145     Total Intake(mL/kg) 145 (44.8)     Net +145           Urine Unmeasured Occurrence 3 x 1 x    Stool Unmeasured Occurrence 2 x 1 x          LABS     Infant Blood Type: unknown  TIFFANY: N/A   Passive AB:N/A    Recent Results (from the past 24 hour(s))   Bilirubin,  Panel    Collection Time: 21  2:25 PM    Specimen: Blood   Result 
Value Ref Range    Bilirubin, Direct 0.2 0.0 - 0.8 mg/dL    Bilirubin, Indirect 5.2 mg/dL    Total Bilirubin 5.4 0.0 - 8.0 mg/dL       TCI: Risk assessment of Hyperbilirubinemia  Manual Calculation 's  Age in Hours: 25  TcB Point of Care testin.2  Calculation Age in Hours: 40  Risk Assessment of Patient is: Low intermediate risk zone     TESTING      BP:   76/48 Location: Right Arm          70/53   Location: Right Leg    CCHD Critical Congen Heart Defect Test Result: pass (21 1345)   Car Seat Challenge Test  n/a   Hearing Screen Hearing Screen Date: 21 (21 1000)  Hearing Screen, Left Ear: passed (21 1000)  Hearing Screen, Right Ear: passed (21 1000)    [place] Screen   collected and results pending       Immunization History   Administered Date(s) Administered   • Hep B, Adolescent or Pediatric 2021       As indicated in active problem list and/or as listed as below. The plan of care has been / will be discussed with the family/primary caregiver(s).      FOLLOW UP:     Check/ follow up: cordstat toxicology    Other Issues: GBS Plan: GBS negative, infant clinically well on exam, routine  care.    STACIA Escalera  [facility] -  Nursery  [facility]  Documentation reviewed and electronically signed on 2021 at 10:00 EDT    
right upper arm

## 2021-03-30 ENCOUNTER — NON-APPOINTMENT (OUTPATIENT)
Age: 86
End: 2021-03-30

## 2021-03-30 ENCOUNTER — APPOINTMENT (OUTPATIENT)
Dept: ELECTROPHYSIOLOGY | Facility: CLINIC | Age: 86
End: 2021-03-30
Payer: MEDICARE

## 2021-03-30 PROCEDURE — 93296 REM INTERROG EVL PM/IDS: CPT

## 2021-03-30 PROCEDURE — 93294 REM INTERROG EVL PM/LDLS PM: CPT

## 2021-06-28 ENCOUNTER — APPOINTMENT (OUTPATIENT)
Dept: ELECTROPHYSIOLOGY | Facility: CLINIC | Age: 86
End: 2021-06-28
Payer: MEDICARE

## 2021-06-29 ENCOUNTER — NON-APPOINTMENT (OUTPATIENT)
Age: 86
End: 2021-06-29

## 2021-06-29 PROCEDURE — 93296 REM INTERROG EVL PM/IDS: CPT

## 2021-06-29 PROCEDURE — 93294 REM INTERROG EVL PM/LDLS PM: CPT

## 2021-07-08 NOTE — ED ADULT NURSE NOTE - EXTENSIONS OF SELF_ADULT
[facility]    NAME: Og Lopez  AGE: 32 y o  SEX: male  : 1990     DATE: 2021     Assessment and Plan:     Problem List Items Addressed This Visit        Other    Anxiety - Primary     Ordered xanax prn  pmpd checked  Has not had a controlled substance in past 5 years  Contracted signed         Relevant Medications    ALPRAZolam (XANAX) 0 5 mg tablet      Other Visit Diagnoses     Healthcare maintenance        Relevant Orders    Lipid panel    Comprehensive metabolic panel    CBC and differential    TSH, 3rd generation with Free T4 reflex          Immunizations and preventive care screenings were discussed with patient today  Appropriate education was printed on patient's after visit summary  Counseling:  · Exercise: the importance of regular exercise/physical activity was discussed  Recommend exercise 3-5 times per week for at least 30 minutes  Return in about 6 months (around 2022) for Next scheduled follow up  Chief Complaint:     Chief Complaint   Patient presents with    [facility] Care      History of Present Illness:     Adult Annual Physical   Patient here for a comprehensive physical exam  The patient reports problems - anxiety- needs somethign to take prn  Diet and Physical Activity  · Diet/Nutrition: well balanced diet  · Exercise: walking  Depression Screening  PHQ-9 Depression Screening    PHQ-9:   Frequency of the following problems over the past two weeks:      Little interest or pleasure in doing things: 0 - not at all  Feeling down, depressed, or hopeless: 0 - not at all  PHQ-2 Score: 0       General Health  · Sleep: sleeps well  · Hearing: normal - bilateral   · Vision: no vision problems  · Dental: regular dental visits   Health  · History of STDs?: no      Review of Systems:     Review of Systems   Constitutional: Negative  HENT: Negative      Eyes: Negative  Respiratory: Negative  Cardiovascular: Negative  Gastrointestinal: Negative  Musculoskeletal: Negative  Neurological: Negative  Psychiatric/Behavioral: The patient is nervous/anxious  Past Medical History:     No past medical history on file  Past Surgical History:     Past Surgical History:   Procedure Laterality Date    HAND SURGERY        Social History:     Social History     Socioeconomic History    Marital status: Single     Spouse name: None    Number of children: None    Years of education: None    Highest education level: None   Occupational History    None   Tobacco Use    Smoking status: Former Smoker    Smokeless tobacco: Never Used   Substance and Sexual Activity    Alcohol use: None    Drug use: None    Sexual activity: None   Other Topics Concern    None   Social History Narrative    None     Social Determinants of Health     Financial Resource Strain:     Difficulty of Paying Living Expenses:    Food Insecurity:     Worried About Running Out of Food in the Last Year:     Ran Out of Food in the Last Year:    Transportation Needs:     Lack of Transportation (Medical):  Lack of Transportation (Non-Medical):    Physical Activity:     Days of Exercise per Week:     Minutes of Exercise per Session:    Stress:     Feeling of Stress :    Social Connections:     Frequency of Communication with Friends and Family:     Frequency of Social Gatherings with Friends and Family:     Attends Anabaptist Services:     Active Member of Clubs or Organizations:     Attends Club or Organization Meetings:     Marital Status:    Intimate Partner Violence:     Fear of Current or Ex-Partner:     Emotionally Abused:     Physically Abused:     Sexually Abused:       Family History:     No family history on file     Current Medications:     Current Outpatient Medications   Medication Sig Dispense Refill    naproxen (NAPROSYN) 500 mg tablet Take 1 tablet (500 mg total) by mouth 2 (two) times a day as needed for moderate pain 60 tablet 1    ALPRAZolam (XANAX) 0 5 mg tablet Take 1 tablet (0 5 mg total) by mouth daily at bedtime as needed for anxiety 30 tablet 0     No current facility-administered medications for this visit  Allergies:     No Known Allergies   Physical Exam:     /70   Pulse 72   Temp 97 9 °F (36 6 °C) (Temporal)   Ht 5' 6" (1 676 m)   Wt 60 7 kg (133 lb 12 8 oz)   SpO2 99%   BMI 21 60 kg/m²     Physical Exam  Vitals reviewed  Constitutional:       Appearance: Normal appearance  HENT:      Head: Normocephalic  Right Ear: Tympanic membrane, ear canal and external ear normal       Left Ear: Tympanic membrane, ear canal and external ear normal       Nose: Nose normal       Mouth/Throat:      Mouth: Mucous membranes are moist    Eyes:      Conjunctiva/sclera: Conjunctivae normal       Pupils: Pupils are equal, round, and reactive to light  Cardiovascular:      Rate and Rhythm: Normal rate and regular rhythm  Pulses: Normal pulses  Heart sounds: Normal heart sounds  Pulmonary:      Effort: Pulmonary effort is normal       Breath sounds: Normal breath sounds  Abdominal:      General: Abdomen is flat  Bowel sounds are normal       Palpations: Abdomen is soft  Musculoskeletal:         General: Normal range of motion  Cervical back: Normal range of motion and neck supple  Skin:     General: Skin is warm and dry  Neurological:      General: No focal deficit present  Mental Status: He is alert and oriented to person, place, and time     Psychiatric:         Mood and Affect: Mood normal          Behavior: Behavior normal           [facility], [address] [place] [address] None

## 2021-09-28 ENCOUNTER — APPOINTMENT (OUTPATIENT)
Dept: ELECTROPHYSIOLOGY | Facility: CLINIC | Age: 86
End: 2021-09-28
Payer: MEDICARE

## 2021-09-28 VITALS
OXYGEN SATURATION: 95 % | DIASTOLIC BLOOD PRESSURE: 51 MMHG | HEIGHT: 63.5 IN | SYSTOLIC BLOOD PRESSURE: 182 MMHG | BODY MASS INDEX: 39.2 KG/M2 | WEIGHT: 224 LBS | HEART RATE: 59 BPM

## 2021-09-28 VITALS — DIASTOLIC BLOOD PRESSURE: 70 MMHG | SYSTOLIC BLOOD PRESSURE: 140 MMHG

## 2021-09-28 DIAGNOSIS — I47.2 VENTRICULAR TACHYCARDIA: ICD-10-CM

## 2021-09-28 PROCEDURE — 93280 PM DEVICE PROGR EVAL DUAL: CPT

## 2021-09-28 RX ORDER — SITAGLIPTIN 25 MG/1
25 TABLET, FILM COATED ORAL DAILY
Refills: 0 | Status: ACTIVE | COMMUNITY

## 2021-09-28 RX ORDER — DILTIAZEM HYDROCHLORIDE 180 MG/1
180 CAPSULE, COATED, EXTENDED RELEASE ORAL DAILY
Refills: 0 | Status: ACTIVE | COMMUNITY

## 2021-09-28 RX ORDER — WARFARIN 2 MG/1
2 TABLET ORAL DAILY
Refills: 0 | Status: DISCONTINUED | COMMUNITY
End: 2021-09-28

## 2021-09-28 RX ORDER — METOPROLOL SUCCINATE 50 MG/1
50 TABLET, EXTENDED RELEASE ORAL
Qty: 90 | Refills: 3 | Status: ACTIVE | COMMUNITY

## 2021-09-28 RX ORDER — ATORVASTATIN CALCIUM 20 MG/1
20 TABLET, FILM COATED ORAL
Qty: 90 | Refills: 0 | Status: ACTIVE | COMMUNITY

## 2021-09-30 ENCOUNTER — LABORATORY RESULT (OUTPATIENT)
Age: 86
End: 2021-09-30

## 2021-10-08 NOTE — PATIENT PROFILE ADULT - NSPROALCOHOLUSE2_GEN_A_NUR
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 Denies substance use history. No formal medical history. Patient was bib  as pt feeling more depressed with SI to jump out of the window. Legal status Voluntary.     Impression : MDD severe recurrent precipitated by psychosocial stressors exacerbated by stimulant use (prescribed for several days last week). Will continue regimen as below. pt reports subjective improvement in mood and denies si and hi at this time. no psychotic or manic symptoms. no pain. Patient amenable to staying for treatment.     plan:   -Safety: q15 obs  -Psychopharm:  	Welbutrin XL 150mg daily   -PRNs: Haldol 5mg + Lorazepam 2mg +/- Benadryl PO or IM for non-redirectable agitation  	Lorazepam 0.5mg BID PRN PO for anxiety   -Individual, group, milieu therapy  -Psychoeducation provided to patient regarding indication for medications, alternatives, side effects, adherence.  -Medical:   	PCOS: not on medications   	Asthma: not on medications   -PRN: Tylenol 650mg q6-8hr for moderate pain  -Routine labs  -Disposition: Pending clinical course; coordinate with team social work  -Legal status: vol
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 Denies substance use history. No formal medical history. Patient was bib  as pt feeling more depressed with SI to jump out of the window. Legal status Voluntary.     On exam, patient denies any current SI and stated he feels "cautiously hopeful".     C/w: Welbutrin XL 150mg daily   -PRNs: Haldol 5mg + Lorazepam 2mg +/- Benadryl PO or IM for non-redirectable agitation  	Lorazepam 0.5mg BID PRN PO for anxiety   -Individual, group, milieu therapy  -Psychoeducation provided to patient regarding indication for medications, alternatives, side effects, adherence.  -Medical:   	PCOS: not on medications   	Asthma: not on medications   -PRN: Tylenol 650mg q6-8hr for moderate pain  -Routine labs  -Disposition: Pending clinical course; coordinate with team social work  -Legal status: vol
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 Denies substance use history. No formal medical history. Patient was bib  as pt feeling more depressed with SI to jump out of the window. Legal status Voluntary.     Impression : MDD severe recurrent precipitated by psychosocial stressors exacerbated by stimulant use (d/c while inpatient).  Wellbutrin XL to 300mg daily. pt continues to report subjective improvement in mood and denies si and hi at this time. fluctuating anxiety that is contextual when discussing work and unchanged compared to yesterday. no psychotic or manic symptoms. no pain. Patient amenable to staying for treatment. no 1:1 needed.     plan:   -Safety: q15 obs  -Psychopharm:  	Welbutrin XL 300mg daily   -PRNs: Haldol 5mg + Lorazepam 2mg +/- Benadryl PO or IM for non-redirectable agitation  	Clonazepam 0.25mg BID    -Individual, group, milieu therapy  -Psychoeducation provided to patient regarding indication for medications, alternatives, side effects, adherence.  -Medical:   	PCOS: not on medications   	Asthma: not on medications   -PRN: Tylenol 650mg q6-8hr for moderate pain  -Routine labs  -Disposition: Pending clinical course; coordinate with team social work  -Legal status: vol
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 Denies substance use history. No formal medical history. Patient was bib  as pt feeling more depressed with SI to jump out of the window. Legal status Voluntary.     Impression : MDD severe recurrent precipitated by psychosocial stressors exacerbated by stimulant use (d/c while inpatient).  Wellbutrin XL to 300mg daily. pt continues to report subjective improvement in mood and denies si and hi at this time. fluctuating anxiety that is contextual when discussing work. no psychotic or manic symptoms. no pain. Patient amenable to staying for treatment. no 1:1 needed.     plan:   -Safety: q15 obs  -Psychopharm:  	Welbutrin XL 300mg daily   -PRNs: Haldol 5mg + Lorazepam 2mg +/- Benadryl PO or IM for non-redirectable agitation  	Clonazepam 0.25mg BID    -Individual, group, milieu therapy  -Psychoeducation provided to patient regarding indication for medications, alternatives, side effects, adherence.  -Medical:   	PCOS: not on medications   	Asthma: not on medications   -PRN: Tylenol 650mg q6-8hr for moderate pain  -Routine labs  -Disposition: Pending clinical course; coordinate with team social work  -Legal status: vol
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 Denies substance use history. No formal medical history. Patient was bib  as pt feeling more depressed with SI to jump out of the window. Legal status Voluntary.     Impression : MDD severe recurrent precipitated by psychosocial stressors exacerbated by stimulant use (d/c while inpatient).  Wellbutrin XL to 300mg daily. patient with improved sx of depression and in remission. No SI or HI. Safety assessment documented in discharge summary. No acute safety related issues and patient stable for discharge with outpatient follow up     plan:   -Safety: q15 obs  -Psychopharm:  	Welbutrin XL 300mg daily   -PRNs: Haldol 5mg + Lorazepam 2mg +/- Benadryl PO or IM for non-redirectable agitation  	Clonazepam 0.25mg BID    -Individual, group, milieu therapy  -Psychoeducation provided to patient regarding indication for medications, alternatives, side effects, adherence.  -Medical:   	PCOS: not on medications   	Asthma: not on medications   -PRN: Tylenol 650mg q6-8hr for moderate pain  -Routine labs  -Disposition: discharge today  -Legal status: vol
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 Denies substance use history. No formal medical history. Patient was bib  as pt feeling more depressed with SI to jump out of the window. Legal status Voluntary.     Impression : MDD severe recurrent precipitated by psychosocial stressors exacerbated by stimulant use (d/c while inpatient).  Wellbutrin XL to 300mg daily. pt reports subjective improvement in mood and denies si and hi at this time. fluctuating anxiety. no psychotic or manic symptoms. no pain. Patient amenable to staying for treatment.     plan:   -Safety: q15 obs  -Psychopharm:  	Welbutrin XL 300mg daily   -PRNs: Haldol 5mg + Lorazepam 2mg +/- Benadryl PO or IM for non-redirectable agitation  	Clonazepam 0.25mg BID    -Individual, group, milieu therapy  -Psychoeducation provided to patient regarding indication for medications, alternatives, side effects, adherence.  -Medical:   	PCOS: not on medications   	Asthma: not on medications   -PRN: Tylenol 650mg q6-8hr for moderate pain  -Routine labs  -Disposition: Pending clinical course; coordinate with team social work  -Legal status: vol
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 Denies substance use history. No formal medical history. Patient was bib  as pt feeling more depressed with SI to jump out of the window. Legal status Voluntary.     Impression : MDD severe recurrent precipitated by psychosocial stressors exacerbated by stimulant use (prescribed for several days last week). Will continue regimen as below. pt reports subjective improvement in mood and denies si and hi at this time. no psychotic or manic symptoms. no pain. Patient amenable to staying for treatment.     plan:   -Safety: q15 obs  -Psychopharm:  	Welbutrin XL 150mg daily   -PRNs: Haldol 5mg + Lorazepam 2mg +/- Benadryl PO or IM for non-redirectable agitation  	Lorazepam 0.5mg BID PRN PO for anxiety   -Individual, group, milieu therapy  -Psychoeducation provided to patient regarding indication for medications, alternatives, side effects, adherence.  -Medical:   	PCOS: not on medications   	Asthma: not on medications   -PRN: Tylenol 650mg q6-8hr for moderate pain  -Routine labs  -Disposition: Pending clinical course; coordinate with team social work  -Legal status: vol
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 Denies substance use history. No formal medical history. Patient was bib  as pt feeling more depressed with SI to jump out of the window. Legal status Voluntary.     Impression : MDD severe recurrent precipitated by psychosocial stressors exacerbated by stimulant use (d/c while inpatient). Will INCREASE Wellbutrin XL to 300mg daily. pt reports subjective improvement in mood and denies si and hi at this time. no psychotic or manic symptoms. no pain. Patient amenable to staying for treatment.     plan:   -Safety: q15 obs  -Psychopharm:  	Welbutrin XL 300mg daily   -PRNs: Haldol 5mg + Lorazepam 2mg +/- Benadryl PO or IM for non-redirectable agitation  	Lorazepam 0.5mg BID PRN PO for anxiety   -Individual, group, milieu therapy  -Psychoeducation provided to patient regarding indication for medications, alternatives, side effects, adherence.  -Medical:   	PCOS: not on medications   	Asthma: not on medications   -PRN: Tylenol 650mg q6-8hr for moderate pain  -Routine labs  -Disposition: Pending clinical course; coordinate with team social work  -Legal status: vol
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 Denies substance use history. No formal medical history. Patient was bib  as pt feeling more depressed with SI to jump out of the window. Legal status Voluntary.     On exam, patient denies any current SI and stated he remains "cautiously hopeful".     C/w: Welbutrin XL 150mg daily   -PRNs: Haldol 5mg + Lorazepam 2mg +/- Benadryl PO or IM for non-redirectable agitation  	Lorazepam 0.5mg BID PRN PO for anxiety   -Individual, group, milieu therapy  -Psychoeducation provided to patient regarding indication for medications, alternatives, side effects, adherence.  -Medical:   	PCOS: not on medications   	Asthma: not on medications   -PRN: Tylenol 650mg q6-8hr for moderate pain  -Routine labs  -Disposition: Pending clinical course; coordinate with team social work  -Legal status: vol
yes

## 2021-10-28 ENCOUNTER — EMERGENCY (EMERGENCY)
Facility: HOSPITAL | Age: 86
LOS: 0 days | Discharge: HOME | End: 2021-10-28
Attending: STUDENT IN AN ORGANIZED HEALTH CARE EDUCATION/TRAINING PROGRAM
Payer: MEDICARE

## 2021-10-28 VITALS
OXYGEN SATURATION: 98 % | HEART RATE: 63 BPM | RESPIRATION RATE: 19 BRPM | DIASTOLIC BLOOD PRESSURE: 68 MMHG | SYSTOLIC BLOOD PRESSURE: 193 MMHG | TEMPERATURE: 98 F

## 2021-10-28 VITALS — WEIGHT: 218.04 LBS | HEIGHT: 63 IN

## 2021-10-28 DIAGNOSIS — Z95.2 PRESENCE OF PROSTHETIC HEART VALVE: Chronic | ICD-10-CM

## 2021-10-28 DIAGNOSIS — Z95.0 PRESENCE OF CARDIAC PACEMAKER: Chronic | ICD-10-CM

## 2021-10-28 DIAGNOSIS — Z79.84 LONG TERM (CURRENT) USE OF ORAL HYPOGLYCEMIC DRUGS: ICD-10-CM
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
## 2021-10-28 DIAGNOSIS — Z79.01 LONG TERM (CURRENT) USE OF ANTICOAGULANTS: ICD-10-CM
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
## 2021-10-28 DIAGNOSIS — G47.33 OBSTRUCTIVE SLEEP APNEA (ADULT) (PEDIATRIC): ICD-10-CM

## 2021-10-28 DIAGNOSIS — S32.000A WEDGE COMPRESSION FRACTURE OF UNSPECIFIED LUMBAR VERTEBRA, INITIAL ENCOUNTER FOR CLOSED FRACTURE: ICD-10-CM

## 2021-10-28 DIAGNOSIS — E78.5 HYPERLIPIDEMIA, UNSPECIFIED: ICD-10-CM

## 2021-10-28 DIAGNOSIS — Z91.013 ALLERGY TO SEAFOOD: ICD-10-CM

## 2021-10-28 DIAGNOSIS — M54.50 LOW BACK PAIN, UNSPECIFIED: ICD-10-CM

## 2021-10-28 DIAGNOSIS — E11.9 TYPE 2 DIABETES MELLITUS WITHOUT COMPLICATIONS: ICD-10-CM

## 2021-10-28 DIAGNOSIS — Z90.710 ACQUIRED ABSENCE OF BOTH CERVIX AND UTERUS: Chronic | ICD-10-CM

## 2021-10-28 DIAGNOSIS — I48.91 UNSPECIFIED ATRIAL FIBRILLATION: ICD-10-CM

## 2021-10-28 DIAGNOSIS — M19.90 UNSPECIFIED OSTEOARTHRITIS, UNSPECIFIED SITE: ICD-10-CM

## 2021-10-28 DIAGNOSIS — Z90.710 ACQUIRED ABSENCE OF BOTH CERVIX AND UTERUS: ICD-10-CM

## 2021-10-28 DIAGNOSIS — Z95.0 PRESENCE OF CARDIAC PACEMAKER: ICD-10-CM

## 2021-10-28 DIAGNOSIS — Z90.49 ACQUIRED ABSENCE OF OTHER SPECIFIED PARTS OF DIGESTIVE TRACT: ICD-10-CM

## 2021-10-28 DIAGNOSIS — Z90.49 ACQUIRED ABSENCE OF OTHER SPECIFIED PARTS OF DIGESTIVE TRACT: Chronic | ICD-10-CM

## 2021-10-28 DIAGNOSIS — I44.1 ATRIOVENTRICULAR BLOCK, SECOND DEGREE: ICD-10-CM

## 2021-10-28 DIAGNOSIS — Z88.6 ALLERGY STATUS TO ANALGESIC AGENT: ICD-10-CM

## 2021-10-28 DIAGNOSIS — I10 ESSENTIAL (PRIMARY) HYPERTENSION: ICD-10-CM

## 2021-10-28 PROCEDURE — 72131 CT LUMBAR SPINE W/O DYE: CPT | Mod: 26,MG

## 2021-10-28 PROCEDURE — 99284 EMERGENCY DEPT VISIT MOD MDM: CPT

## 2021-10-28 PROCEDURE — G1004: CPT

## 2021-10-28 PROCEDURE — 70450 CT HEAD/BRAIN W/O DYE: CPT | Mod: MG

## 2021-10-28 PROCEDURE — 99284 EMERGENCY DEPT VISIT MOD MDM: CPT | Mod: 25

## 2021-10-28 PROCEDURE — 70450 CT HEAD/BRAIN W/O DYE: CPT | Mod: 26,MG

## 2021-10-28 PROCEDURE — 72125 CT NECK SPINE W/O DYE: CPT | Mod: 26,MG

## 2021-10-28 PROCEDURE — 72131 CT LUMBAR SPINE W/O DYE: CPT | Mod: MG

## 2021-10-28 PROCEDURE — 72125 CT NECK SPINE W/O DYE: CPT | Mod: MG

## 2021-10-28 RX ORDER — LIDOCAINE 4 G/100G
1 CREAM TOPICAL
Qty: 10 | Refills: 0
Start: 2021-10-28 | End: 2021-11-06

## 2021-10-28 RX ORDER — ACETAMINOPHEN 500 MG
650 TABLET ORAL ONCE
Refills: 0 | Status: COMPLETED | OUTPATIENT
Start: 2021-10-28 | End: 2021-10-28

## 2021-10-28 RX ORDER — LIDOCAINE 4 G/100G
1 CREAM TOPICAL ONCE
Refills: 0 | Status: COMPLETED | OUTPATIENT
Start: 2021-10-28 | End: 2021-10-28

## 2021-10-28 RX ADMIN — Medication 650 MILLIGRAM(S): at 15:43

## 2021-10-28 RX ADMIN — LIDOCAINE 1 PATCH: 4 CREAM TOPICAL at 13:08

## 2021-10-28 RX ADMIN — Medication 650 MILLIGRAM(S): at 13:08

## 2021-10-28 NOTE — ED ADULT NURSE NOTE - NSIMPLEMENTINTERV_GEN_ALL_ED
Implemented All Fall with Harm Risk Interventions:  [place] to call system. Call bell, personal items and telephone within reach. Instruct patient to call for assistance. Room bathroom lighting operational. Non-slip footwear when patient is off stretcher. Physically safe environment: no spills, clutter or unnecessary equipment. Stretcher in lowest position, wheels locked, appropriate side rails in place. Provide visual cue, wrist band, yellow gown, etc. Monitor gait and stability. Monitor for mental status changes and reorient to person, place, and time. Review medications for side effects contributing to fall risk. Reinforce activity limits and safety measures with patient and family. Provide visual clues: red socks.

## 2021-10-28 NOTE — ED PROVIDER NOTE - PROGRESS NOTE DETAILS
Tanner DE LA FUENTE: spoke with spine- Dr. Weinstein regarding CT findings- no acute intervention; pain control; patient can f/u in office; patient to continue to take tylenol prn pain; patient able to pivot in ED; offered sw consult for d/c planning but wanting to go home; patient has walker, cane, wheelchair and has help at home.

## 2021-10-28 NOTE — ED ADULT TRIAGE NOTE - CHIEF COMPLAINT QUOTE
Pt arrives to ED s/p fall one week ago landing on back. Comes in today complaining of back pain. Hx spinal stenosis, ppm, aortic valve replacement- takes coumadin daily.

## 2021-10-28 NOTE — ED PROVIDER NOTE - OBJECTIVE STATEMENT
89 y/o female with a PMHx of Afib on Coumadin, arthritis, 2nd degree AV block, carotid atherosclerosis, carpal tunnel syndrome, DM2, HTN, HLD, GIACOMO, PAF, pacemaker, sensory neuropathy, SSS, spinal stenosis, vertigo, h/o TAVR presents to the ED c/o lower back pain s/p fall 1 week ago. Pt states she bent over to put something in the garbage, lost her balance and fell backwards. Pt hit head. No LOC. Denies bladder/bowel incontinence, CP, SOB. Pt took Tylenol at home, last dose yesterday. Allergies: ASA. No other complaints at this time.

## 2021-10-28 NOTE — ED PROVIDER NOTE - PATIENT PORTAL LINK FT
You can access the FollowMyHealth Patient Portal offered by [facility] by registering at the following website: [email or web address]. By joining [facility]’s FollowMyHealth portal, you will also be able to view your health information using other applications (apps) compatible with our system.

## 2021-12-15 NOTE — PROCEDURE NOTE - PROCEDURE DATE TIME, MLM
Called pt, left voice message, she will read her portal and call us back if she has any questions.   14-Dec-2020 20:37

## 2021-12-27 ENCOUNTER — APPOINTMENT (OUTPATIENT)
Dept: ELECTROPHYSIOLOGY | Facility: CLINIC | Age: 86
End: 2021-12-27
Payer: MEDICARE

## 2021-12-28 ENCOUNTER — NON-APPOINTMENT (OUTPATIENT)
Age: 86
End: 2021-12-28

## 2021-12-28 PROCEDURE — 93294 REM INTERROG EVL PM/LDLS PM: CPT

## 2021-12-28 PROCEDURE — 93296 REM INTERROG EVL PM/IDS: CPT

## 2022-03-28 ENCOUNTER — APPOINTMENT (OUTPATIENT)
Dept: ELECTROPHYSIOLOGY | Facility: CLINIC | Age: 87
End: 2022-03-28
Payer: MEDICARE

## 2022-03-29 ENCOUNTER — NON-APPOINTMENT (OUTPATIENT)
Age: 87
End: 2022-03-29

## 2022-03-29 PROCEDURE — 93294 REM INTERROG EVL PM/LDLS PM: CPT

## 2022-03-29 PROCEDURE — 93296 REM INTERROG EVL PM/IDS: CPT

## 2022-06-28 ENCOUNTER — APPOINTMENT (OUTPATIENT)
Dept: ELECTROPHYSIOLOGY | Facility: CLINIC | Age: 87
End: 2022-06-28

## 2022-06-28 ENCOUNTER — NON-APPOINTMENT (OUTPATIENT)
Age: 87
End: 2022-06-28

## 2022-06-28 PROCEDURE — 93294 REM INTERROG EVL PM/LDLS PM: CPT

## 2022-06-28 PROCEDURE — 93296 REM INTERROG EVL PM/IDS: CPT

## 2022-09-27 ENCOUNTER — NON-APPOINTMENT (OUTPATIENT)
Age: 87
End: 2022-09-27

## 2022-09-27 ENCOUNTER — APPOINTMENT (OUTPATIENT)
Dept: ELECTROPHYSIOLOGY | Facility: CLINIC | Age: 87
End: 2022-09-27

## 2022-09-27 VITALS
HEIGHT: 63.5 IN | WEIGHT: 207 LBS | HEART RATE: 59 BPM | SYSTOLIC BLOOD PRESSURE: 151 MMHG | OXYGEN SATURATION: 100 % | BODY MASS INDEX: 36.22 KG/M2 | DIASTOLIC BLOOD PRESSURE: 62 MMHG | RESPIRATION RATE: 16 BRPM

## 2022-09-27 DIAGNOSIS — I44.1 ATRIOVENTRICULAR BLOCK, SECOND DEGREE: ICD-10-CM

## 2022-09-27 DIAGNOSIS — I48.0 PAROXYSMAL ATRIAL FIBRILLATION: ICD-10-CM

## 2022-09-27 PROCEDURE — 93280 PM DEVICE PROGR EVAL DUAL: CPT

## 2022-09-27 RX ORDER — MAGNESIUM OXIDE 241.3 MG/1000MG
400 TABLET ORAL DAILY
Qty: 30 | Refills: 2 | Status: DISCONTINUED | COMMUNITY
Start: 2021-10-01 | End: 2022-09-27

## 2022-09-27 RX ORDER — WARFARIN 2 MG/1
2 TABLET ORAL
Refills: 0 | Status: ACTIVE | COMMUNITY

## 2022-12-26 ENCOUNTER — APPOINTMENT (OUTPATIENT)
Dept: ELECTROPHYSIOLOGY | Facility: CLINIC | Age: 87
End: 2022-12-26

## 2022-12-27 ENCOUNTER — NON-APPOINTMENT (OUTPATIENT)
Age: 87
End: 2022-12-27

## 2022-12-27 PROCEDURE — 93294 REM INTERROG EVL PM/LDLS PM: CPT

## 2022-12-27 PROCEDURE — 93296 REM INTERROG EVL PM/IDS: CPT

## 2023-01-01 ENCOUNTER — NON-APPOINTMENT (OUTPATIENT)
Age: 88
End: 2023-01-01

## 2023-01-01 ENCOUNTER — APPOINTMENT (OUTPATIENT)
Dept: ELECTROPHYSIOLOGY | Facility: CLINIC | Age: 88
End: 2023-01-01
Payer: MEDICARE

## 2023-01-01 VITALS
DIASTOLIC BLOOD PRESSURE: 84 MMHG | WEIGHT: 190 LBS | BODY MASS INDEX: 33.25 KG/M2 | OXYGEN SATURATION: 98 % | HEIGHT: 63.5 IN | SYSTOLIC BLOOD PRESSURE: 160 MMHG | HEART RATE: 60 BPM | RESPIRATION RATE: 16 BRPM

## 2023-01-01 DIAGNOSIS — Z95.0 PRESENCE OF CARDIAC PACEMAKER: ICD-10-CM

## 2023-01-01 DIAGNOSIS — I49.5 SICK SINUS SYNDROME: ICD-10-CM

## 2023-01-01 PROCEDURE — 93280 PM DEVICE PROGR EVAL DUAL: CPT

## 2023-01-01 PROCEDURE — 93296 REM INTERROG EVL PM/IDS: CPT

## 2023-01-01 PROCEDURE — 93294 REM INTERROG EVL PM/LDLS PM: CPT

## 2023-09-28 PROBLEM — I49.5 SICK SINUS SYNDROME: Status: ACTIVE | Noted: 2019-05-15

## 2023-09-28 PROBLEM — Z95.0 CARDIAC PACEMAKER IN SITU: Status: ACTIVE | Noted: 2017-05-05

## 2023-10-19 NOTE — ED PROVIDER NOTE - RESPIRATORY NEGATIVE STATEMENT, MLM
Outpatient HD unit: [facility]   HD tx days: MWF   HD tx time: 4hrs   HD access: LUE AVF   HD modality: iHD   Residual urine: Anuric   Estimated Creatinine Clearance: 33.8 mL/min (A) (based on SCr of 2.3 mg/dL (H)).    Toelrating iHD well    -- Nephrology consulted for iHD management. Appreciate their assistance   -- CMP daily and trend   -- Avoid nephrotoxins   -- Renally dose all medications to appropriate GFR / CrCl   -- Hgb > 7 and MAP > 65 goals    no chest pain, no cough, and no shortness of breath.

## 2024-01-01 ENCOUNTER — INPATIENT (INPATIENT)
Facility: HOSPITAL | Age: 89
LOS: 11 days | DRG: 69 | End: 2024-03-23
Attending: STUDENT IN AN ORGANIZED HEALTH CARE EDUCATION/TRAINING PROGRAM | Admitting: FAMILY MEDICINE
Payer: MEDICARE

## 2024-01-01 VITALS
HEIGHT: 63 IN | HEART RATE: 72 BPM | SYSTOLIC BLOOD PRESSURE: 202 MMHG | WEIGHT: 207.9 LBS | DIASTOLIC BLOOD PRESSURE: 51 MMHG | RESPIRATION RATE: 18 BRPM | OXYGEN SATURATION: 98 %

## 2024-01-01 VITALS
HEART RATE: 124 BPM | DIASTOLIC BLOOD PRESSURE: 87 MMHG | RESPIRATION RATE: 18 BRPM | OXYGEN SATURATION: 93 % | SYSTOLIC BLOOD PRESSURE: 158 MMHG | TEMPERATURE: 99 F

## 2024-01-01 DIAGNOSIS — Z90.49 ACQUIRED ABSENCE OF OTHER SPECIFIED PARTS OF DIGESTIVE TRACT: Chronic | ICD-10-CM

## 2024-01-01 DIAGNOSIS — G45.9 TRANSIENT CEREBRAL ISCHEMIC ATTACK, UNSPECIFIED: ICD-10-CM

## 2024-01-01 DIAGNOSIS — K56.609 UNSPECIFIED INTESTINAL OBSTRUCTION, UNSPECIFIED AS TO PARTIAL VERSUS COMPLETE OBSTRUCTION: ICD-10-CM

## 2024-01-01 DIAGNOSIS — Z90.710 ACQUIRED ABSENCE OF BOTH CERVIX AND UTERUS: Chronic | ICD-10-CM

## 2024-01-01 DIAGNOSIS — K65.9 PERITONITIS, UNSPECIFIED: ICD-10-CM

## 2024-01-01 DIAGNOSIS — Z95.0 PRESENCE OF CARDIAC PACEMAKER: Chronic | ICD-10-CM

## 2024-01-01 DIAGNOSIS — K55.9 VASCULAR DISORDER OF INTESTINE, UNSPECIFIED: ICD-10-CM

## 2024-01-01 DIAGNOSIS — Z95.2 PRESENCE OF PROSTHETIC HEART VALVE: Chronic | ICD-10-CM

## 2024-01-01 DIAGNOSIS — A41.9 SEPSIS, UNSPECIFIED ORGANISM: ICD-10-CM

## 2024-01-01 LAB
A1C WITH ESTIMATED AVERAGE GLUCOSE RESULT: 7.4 % — HIGH (ref 4–5.6)
A1C WITH ESTIMATED AVERAGE GLUCOSE RESULT: 7.5 % — HIGH (ref 4–5.6)
ADD ON TEST-SPECIMEN IN LAB: SIGNIFICANT CHANGE UP
ALBUMIN SERPL ELPH-MCNC: 1.5 G/DL — LOW (ref 3.3–5)
ALBUMIN SERPL ELPH-MCNC: 1.7 G/DL — LOW (ref 3.3–5)
ALBUMIN SERPL ELPH-MCNC: 2 G/DL — LOW (ref 3.3–5)
ALBUMIN SERPL ELPH-MCNC: 2.3 G/DL — LOW (ref 3.3–5)
ALBUMIN SERPL ELPH-MCNC: 2.3 G/DL — LOW (ref 3.3–5)
ALBUMIN SERPL ELPH-MCNC: 2.5 G/DL — LOW (ref 3.3–5)
ALBUMIN SERPL ELPH-MCNC: 3 G/DL — LOW (ref 3.3–5)
ALP SERPL-CCNC: 104 U/L — SIGNIFICANT CHANGE UP (ref 40–120)
ALP SERPL-CCNC: 132 U/L — HIGH (ref 40–120)
ALP SERPL-CCNC: 73 U/L — SIGNIFICANT CHANGE UP (ref 40–120)
ALP SERPL-CCNC: 80 U/L — SIGNIFICANT CHANGE UP (ref 40–120)
ALP SERPL-CCNC: 81 U/L — SIGNIFICANT CHANGE UP (ref 40–120)
ALP SERPL-CCNC: 87 U/L — SIGNIFICANT CHANGE UP (ref 40–120)
ALP SERPL-CCNC: 99 U/L — SIGNIFICANT CHANGE UP (ref 40–120)
ALT FLD-CCNC: 11 U/L — LOW (ref 12–78)
ALT FLD-CCNC: 11 U/L — LOW (ref 12–78)
ALT FLD-CCNC: 13 U/L — SIGNIFICANT CHANGE UP (ref 12–78)
ALT FLD-CCNC: 15 U/L — SIGNIFICANT CHANGE UP (ref 12–78)
ALT FLD-CCNC: 16 U/L — SIGNIFICANT CHANGE UP (ref 12–78)
ALT FLD-CCNC: 16 U/L — SIGNIFICANT CHANGE UP (ref 12–78)
ALT FLD-CCNC: 19 U/L — SIGNIFICANT CHANGE UP (ref 12–78)
ANION GAP SERPL CALC-SCNC: 10 MMOL/L — SIGNIFICANT CHANGE UP (ref 5–17)
ANION GAP SERPL CALC-SCNC: 11 MMOL/L — SIGNIFICANT CHANGE UP (ref 5–17)
ANION GAP SERPL CALC-SCNC: 11 MMOL/L — SIGNIFICANT CHANGE UP (ref 5–17)
ANION GAP SERPL CALC-SCNC: 5 MMOL/L — SIGNIFICANT CHANGE UP (ref 5–17)
ANION GAP SERPL CALC-SCNC: 5 MMOL/L — SIGNIFICANT CHANGE UP (ref 5–17)
ANION GAP SERPL CALC-SCNC: 6 MMOL/L — SIGNIFICANT CHANGE UP (ref 5–17)
ANION GAP SERPL CALC-SCNC: 7 MMOL/L — SIGNIFICANT CHANGE UP (ref 5–17)
ANION GAP SERPL CALC-SCNC: 8 MMOL/L — SIGNIFICANT CHANGE UP (ref 5–17)
APTT BLD: 20.3 SEC — LOW (ref 24.5–35.6)
APTT BLD: 30 SEC — SIGNIFICANT CHANGE UP (ref 24.5–35.6)
APTT BLD: 30.6 SEC — SIGNIFICANT CHANGE UP (ref 24.5–35.6)
APTT BLD: 35.6 SEC — SIGNIFICANT CHANGE UP (ref 24.5–35.6)
APTT BLD: 45.8 SEC — HIGH (ref 24.5–35.6)
AST SERPL-CCNC: 16 U/L — SIGNIFICANT CHANGE UP (ref 15–37)
AST SERPL-CCNC: 22 U/L — SIGNIFICANT CHANGE UP (ref 15–37)
AST SERPL-CCNC: 26 U/L — SIGNIFICANT CHANGE UP (ref 15–37)
AST SERPL-CCNC: 28 U/L — SIGNIFICANT CHANGE UP (ref 15–37)
AST SERPL-CCNC: 30 U/L — SIGNIFICANT CHANGE UP (ref 15–37)
AST SERPL-CCNC: 33 U/L — SIGNIFICANT CHANGE UP (ref 15–37)
AST SERPL-CCNC: 36 U/L — SIGNIFICANT CHANGE UP (ref 15–37)
B PERT IGG+IGM PNL SER: ABNORMAL
BASE EXCESS BLDA CALC-SCNC: 0.1 MMOL/L — SIGNIFICANT CHANGE UP (ref -2–3)
BASOPHILS # BLD AUTO: 0 K/UL — SIGNIFICANT CHANGE UP (ref 0–0.2)
BASOPHILS # BLD AUTO: 0.05 K/UL — SIGNIFICANT CHANGE UP (ref 0–0.2)
BASOPHILS # BLD AUTO: 0.05 K/UL — SIGNIFICANT CHANGE UP (ref 0–0.2)
BASOPHILS NFR BLD AUTO: 0 % — SIGNIFICANT CHANGE UP (ref 0–2)
BASOPHILS NFR BLD AUTO: 0.4 % — SIGNIFICANT CHANGE UP (ref 0–2)
BASOPHILS NFR BLD AUTO: 0.5 % — SIGNIFICANT CHANGE UP (ref 0–2)
BILIRUB DIRECT SERPL-MCNC: 0.3 MG/DL — SIGNIFICANT CHANGE UP (ref 0–0.3)
BILIRUB INDIRECT FLD-MCNC: 0.7 MG/DL — SIGNIFICANT CHANGE UP (ref 0.2–1)
BILIRUB SERPL-MCNC: 0.4 MG/DL — SIGNIFICANT CHANGE UP (ref 0.2–1.2)
BILIRUB SERPL-MCNC: 0.4 MG/DL — SIGNIFICANT CHANGE UP (ref 0.2–1.2)
BILIRUB SERPL-MCNC: 0.5 MG/DL — SIGNIFICANT CHANGE UP (ref 0.2–1.2)
BILIRUB SERPL-MCNC: 0.6 MG/DL — SIGNIFICANT CHANGE UP (ref 0.2–1.2)
BILIRUB SERPL-MCNC: 0.7 MG/DL — SIGNIFICANT CHANGE UP (ref 0.2–1.2)
BILIRUB SERPL-MCNC: 0.8 MG/DL — SIGNIFICANT CHANGE UP (ref 0.2–1.2)
BILIRUB SERPL-MCNC: 1 MG/DL — SIGNIFICANT CHANGE UP (ref 0.2–1.2)
BLD GP AB SCN SERPL QL: SIGNIFICANT CHANGE UP
BLOOD GAS COMMENTS ARTERIAL: SIGNIFICANT CHANGE UP
BUN SERPL-MCNC: 21 MG/DL — SIGNIFICANT CHANGE UP (ref 7–23)
BUN SERPL-MCNC: 23 MG/DL — SIGNIFICANT CHANGE UP (ref 7–23)
BUN SERPL-MCNC: 25 MG/DL — HIGH (ref 7–23)
BUN SERPL-MCNC: 50 MG/DL — HIGH (ref 7–23)
BUN SERPL-MCNC: 52 MG/DL — HIGH (ref 7–23)
BUN SERPL-MCNC: 53 MG/DL — HIGH (ref 7–23)
BUN SERPL-MCNC: 59 MG/DL — HIGH (ref 7–23)
BUN SERPL-MCNC: 62 MG/DL — HIGH (ref 7–23)
BUN SERPL-MCNC: 77 MG/DL — HIGH (ref 7–23)
BUN SERPL-MCNC: 86 MG/DL — HIGH (ref 7–23)
BUN SERPL-MCNC: 88 MG/DL — HIGH (ref 7–23)
BUN SERPL-MCNC: 94 MG/DL — HIGH (ref 7–23)
C DIFF BY PCR RESULT: SIGNIFICANT CHANGE UP
CALCIUM SERPL-MCNC: 7.9 MG/DL — LOW (ref 8.5–10.1)
CALCIUM SERPL-MCNC: 8 MG/DL — LOW (ref 8.5–10.1)
CALCIUM SERPL-MCNC: 8.4 MG/DL — LOW (ref 8.5–10.1)
CALCIUM SERPL-MCNC: 8.5 MG/DL — SIGNIFICANT CHANGE UP (ref 8.5–10.1)
CALCIUM SERPL-MCNC: 8.6 MG/DL — SIGNIFICANT CHANGE UP (ref 8.5–10.1)
CALCIUM SERPL-MCNC: 8.6 MG/DL — SIGNIFICANT CHANGE UP (ref 8.5–10.1)
CALCIUM SERPL-MCNC: 8.7 MG/DL — SIGNIFICANT CHANGE UP (ref 8.5–10.1)
CALCIUM SERPL-MCNC: 8.7 MG/DL — SIGNIFICANT CHANGE UP (ref 8.5–10.1)
CALCIUM SERPL-MCNC: 8.8 MG/DL — SIGNIFICANT CHANGE UP (ref 8.5–10.1)
CALCIUM SERPL-MCNC: 8.9 MG/DL — SIGNIFICANT CHANGE UP (ref 8.5–10.1)
CALCIUM SERPL-MCNC: 9 MG/DL — SIGNIFICANT CHANGE UP (ref 8.5–10.1)
CALCIUM SERPL-MCNC: 9 MG/DL — SIGNIFICANT CHANGE UP (ref 8.5–10.1)
CHLORIDE SERPL-SCNC: 104 MMOL/L — SIGNIFICANT CHANGE UP (ref 96–108)
CHLORIDE SERPL-SCNC: 104 MMOL/L — SIGNIFICANT CHANGE UP (ref 96–108)
CHLORIDE SERPL-SCNC: 105 MMOL/L — SIGNIFICANT CHANGE UP (ref 96–108)
CHLORIDE SERPL-SCNC: 106 MMOL/L — SIGNIFICANT CHANGE UP (ref 96–108)
CHLORIDE SERPL-SCNC: 107 MMOL/L — SIGNIFICANT CHANGE UP (ref 96–108)
CHLORIDE SERPL-SCNC: 107 MMOL/L — SIGNIFICANT CHANGE UP (ref 96–108)
CHLORIDE SERPL-SCNC: 108 MMOL/L — SIGNIFICANT CHANGE UP (ref 96–108)
CHLORIDE SERPL-SCNC: 109 MMOL/L — HIGH (ref 96–108)
CHLORIDE SERPL-SCNC: 110 MMOL/L — HIGH (ref 96–108)
CHLORIDE SERPL-SCNC: 110 MMOL/L — HIGH (ref 96–108)
CHLORIDE SERPL-SCNC: 113 MMOL/L — HIGH (ref 96–108)
CHLORIDE SERPL-SCNC: 115 MMOL/L — HIGH (ref 96–108)
CHOLEST SERPL-MCNC: 123 MG/DL — SIGNIFICANT CHANGE UP
CO2 SERPL-SCNC: 22 MMOL/L — SIGNIFICANT CHANGE UP (ref 22–31)
CO2 SERPL-SCNC: 23 MMOL/L — SIGNIFICANT CHANGE UP (ref 22–31)
CO2 SERPL-SCNC: 24 MMOL/L — SIGNIFICANT CHANGE UP (ref 22–31)
CO2 SERPL-SCNC: 24 MMOL/L — SIGNIFICANT CHANGE UP (ref 22–31)
CO2 SERPL-SCNC: 25 MMOL/L — SIGNIFICANT CHANGE UP (ref 22–31)
CO2 SERPL-SCNC: 25 MMOL/L — SIGNIFICANT CHANGE UP (ref 22–31)
CO2 SERPL-SCNC: 26 MMOL/L — SIGNIFICANT CHANGE UP (ref 22–31)
CO2 SERPL-SCNC: 27 MMOL/L — SIGNIFICANT CHANGE UP (ref 22–31)
COLOR FLD: YELLOW — SIGNIFICANT CHANGE UP
CREAT SERPL-MCNC: 1.01 MG/DL — SIGNIFICANT CHANGE UP (ref 0.5–1.3)
CREAT SERPL-MCNC: 1.15 MG/DL — SIGNIFICANT CHANGE UP (ref 0.5–1.3)
CREAT SERPL-MCNC: 1.16 MG/DL — SIGNIFICANT CHANGE UP (ref 0.5–1.3)
CREAT SERPL-MCNC: 1.38 MG/DL — HIGH (ref 0.5–1.3)
CREAT SERPL-MCNC: 1.39 MG/DL — HIGH (ref 0.5–1.3)
CREAT SERPL-MCNC: 1.65 MG/DL — HIGH (ref 0.5–1.3)
CREAT SERPL-MCNC: 1.84 MG/DL — HIGH (ref 0.5–1.3)
CREAT SERPL-MCNC: 2.03 MG/DL — HIGH (ref 0.5–1.3)
CREAT SERPL-MCNC: 2.2 MG/DL — HIGH (ref 0.5–1.3)
CREAT SERPL-MCNC: 2.73 MG/DL — HIGH (ref 0.5–1.3)
CREAT SERPL-MCNC: 2.86 MG/DL — HIGH (ref 0.5–1.3)
CREAT SERPL-MCNC: 3.19 MG/DL — HIGH (ref 0.5–1.3)
CRP SERPL-MCNC: 139 MG/L — HIGH
CRP SERPL-MCNC: 194 MG/L — HIGH
EGFR: 13 ML/MIN/1.73M2 — LOW
EGFR: 15 ML/MIN/1.73M2 — LOW
EGFR: 16 ML/MIN/1.73M2 — LOW
EGFR: 20 ML/MIN/1.73M2 — LOW
EGFR: 22 ML/MIN/1.73M2 — LOW
EGFR: 25 ML/MIN/1.73M2 — LOW
EGFR: 29 ML/MIN/1.73M2 — LOW
EGFR: 35 ML/MIN/1.73M2 — LOW
EGFR: 36 ML/MIN/1.73M2 — LOW
EGFR: 44 ML/MIN/1.73M2 — LOW
EGFR: 44 ML/MIN/1.73M2 — LOW
EGFR: 52 ML/MIN/1.73M2 — LOW
EOSINOPHIL # BLD AUTO: 0 K/UL — SIGNIFICANT CHANGE UP (ref 0–0.5)
EOSINOPHIL # BLD AUTO: 0.05 K/UL — SIGNIFICANT CHANGE UP (ref 0–0.5)
EOSINOPHIL # BLD AUTO: 0.16 K/UL — SIGNIFICANT CHANGE UP (ref 0–0.5)
EOSINOPHIL NFR BLD AUTO: 0 % — SIGNIFICANT CHANGE UP (ref 0–6)
EOSINOPHIL NFR BLD AUTO: 0.4 % — SIGNIFICANT CHANGE UP (ref 0–6)
EOSINOPHIL NFR BLD AUTO: 1.7 % — SIGNIFICANT CHANGE UP (ref 0–6)
ERYTHROCYTE [SEDIMENTATION RATE] IN BLOOD: 60 MM/HR — HIGH (ref 0–20)
ERYTHROCYTE [SEDIMENTATION RATE] IN BLOOD: 87 MM/HR — HIGH (ref 0–20)
ESTIMATED AVERAGE GLUCOSE: 166 MG/DL — HIGH (ref 68–114)
ESTIMATED AVERAGE GLUCOSE: 169 MG/DL — HIGH (ref 68–114)
FLUID INTAKE SUBSTANCE CLASS: SIGNIFICANT CHANGE UP
GAS PNL BLDA: SIGNIFICANT CHANGE UP
GI PCR PANEL: SIGNIFICANT CHANGE UP
GLUCOSE BLDC GLUCOMTR-MCNC: 101 MG/DL — HIGH (ref 70–99)
GLUCOSE BLDC GLUCOMTR-MCNC: 110 MG/DL — HIGH (ref 70–99)
GLUCOSE BLDC GLUCOMTR-MCNC: 111 MG/DL — HIGH (ref 70–99)
GLUCOSE BLDC GLUCOMTR-MCNC: 112 MG/DL — HIGH (ref 70–99)
GLUCOSE BLDC GLUCOMTR-MCNC: 119 MG/DL — HIGH (ref 70–99)
GLUCOSE BLDC GLUCOMTR-MCNC: 119 MG/DL — HIGH (ref 70–99)
GLUCOSE BLDC GLUCOMTR-MCNC: 122 MG/DL — HIGH (ref 70–99)
GLUCOSE BLDC GLUCOMTR-MCNC: 138 MG/DL — HIGH (ref 70–99)
GLUCOSE BLDC GLUCOMTR-MCNC: 138 MG/DL — HIGH (ref 70–99)
GLUCOSE BLDC GLUCOMTR-MCNC: 139 MG/DL — HIGH (ref 70–99)
GLUCOSE BLDC GLUCOMTR-MCNC: 140 MG/DL — HIGH (ref 70–99)
GLUCOSE BLDC GLUCOMTR-MCNC: 142 MG/DL — HIGH (ref 70–99)
GLUCOSE BLDC GLUCOMTR-MCNC: 145 MG/DL — HIGH (ref 70–99)
GLUCOSE BLDC GLUCOMTR-MCNC: 149 MG/DL — HIGH (ref 70–99)
GLUCOSE BLDC GLUCOMTR-MCNC: 151 MG/DL — HIGH (ref 70–99)
GLUCOSE BLDC GLUCOMTR-MCNC: 153 MG/DL — HIGH (ref 70–99)
GLUCOSE BLDC GLUCOMTR-MCNC: 153 MG/DL — HIGH (ref 70–99)
GLUCOSE BLDC GLUCOMTR-MCNC: 156 MG/DL — HIGH (ref 70–99)
GLUCOSE BLDC GLUCOMTR-MCNC: 158 MG/DL — HIGH (ref 70–99)
GLUCOSE BLDC GLUCOMTR-MCNC: 159 MG/DL — HIGH (ref 70–99)
GLUCOSE BLDC GLUCOMTR-MCNC: 161 MG/DL — HIGH (ref 70–99)
GLUCOSE BLDC GLUCOMTR-MCNC: 162 MG/DL — HIGH (ref 70–99)
GLUCOSE BLDC GLUCOMTR-MCNC: 162 MG/DL — HIGH (ref 70–99)
GLUCOSE BLDC GLUCOMTR-MCNC: 165 MG/DL — HIGH (ref 70–99)
GLUCOSE BLDC GLUCOMTR-MCNC: 167 MG/DL — HIGH (ref 70–99)
GLUCOSE BLDC GLUCOMTR-MCNC: 168 MG/DL — HIGH (ref 70–99)
GLUCOSE BLDC GLUCOMTR-MCNC: 171 MG/DL — HIGH (ref 70–99)
GLUCOSE BLDC GLUCOMTR-MCNC: 172 MG/DL — HIGH (ref 70–99)
GLUCOSE BLDC GLUCOMTR-MCNC: 173 MG/DL — HIGH (ref 70–99)
GLUCOSE BLDC GLUCOMTR-MCNC: 185 MG/DL — HIGH (ref 70–99)
GLUCOSE BLDC GLUCOMTR-MCNC: 192 MG/DL — HIGH (ref 70–99)
GLUCOSE BLDC GLUCOMTR-MCNC: 195 MG/DL — HIGH (ref 70–99)
GLUCOSE BLDC GLUCOMTR-MCNC: 198 MG/DL — HIGH (ref 70–99)
GLUCOSE BLDC GLUCOMTR-MCNC: 202 MG/DL — HIGH (ref 70–99)
GLUCOSE BLDC GLUCOMTR-MCNC: 213 MG/DL — HIGH (ref 70–99)
GLUCOSE BLDC GLUCOMTR-MCNC: 218 MG/DL — HIGH (ref 70–99)
GLUCOSE BLDC GLUCOMTR-MCNC: 221 MG/DL — HIGH (ref 70–99)
GLUCOSE BLDC GLUCOMTR-MCNC: 235 MG/DL — HIGH (ref 70–99)
GLUCOSE BLDC GLUCOMTR-MCNC: 238 MG/DL — HIGH (ref 70–99)
GLUCOSE BLDC GLUCOMTR-MCNC: 245 MG/DL — HIGH (ref 70–99)
GLUCOSE BLDC GLUCOMTR-MCNC: 263 MG/DL — HIGH (ref 70–99)
GLUCOSE BLDC GLUCOMTR-MCNC: 267 MG/DL — HIGH (ref 70–99)
GLUCOSE BLDC GLUCOMTR-MCNC: 304 MG/DL — HIGH (ref 70–99)
GLUCOSE BLDC GLUCOMTR-MCNC: 341 MG/DL — HIGH (ref 70–99)
GLUCOSE BLDC GLUCOMTR-MCNC: 66 MG/DL — LOW (ref 70–99)
GLUCOSE BLDC GLUCOMTR-MCNC: 72 MG/DL — SIGNIFICANT CHANGE UP (ref 70–99)
GLUCOSE BLDC GLUCOMTR-MCNC: 97 MG/DL — SIGNIFICANT CHANGE UP (ref 70–99)
GLUCOSE BLDC GLUCOMTR-MCNC: 98 MG/DL — SIGNIFICANT CHANGE UP (ref 70–99)
GLUCOSE SERPL-MCNC: 129 MG/DL — HIGH (ref 70–99)
GLUCOSE SERPL-MCNC: 133 MG/DL — HIGH (ref 70–99)
GLUCOSE SERPL-MCNC: 164 MG/DL — HIGH (ref 70–99)
GLUCOSE SERPL-MCNC: 165 MG/DL — HIGH (ref 70–99)
GLUCOSE SERPL-MCNC: 168 MG/DL — HIGH (ref 70–99)
GLUCOSE SERPL-MCNC: 189 MG/DL — HIGH (ref 70–99)
GLUCOSE SERPL-MCNC: 204 MG/DL — HIGH (ref 70–99)
GLUCOSE SERPL-MCNC: 224 MG/DL — HIGH (ref 70–99)
GLUCOSE SERPL-MCNC: 245 MG/DL — HIGH (ref 70–99)
GLUCOSE SERPL-MCNC: 290 MG/DL — HIGH (ref 70–99)
GLUCOSE SERPL-MCNC: 323 MG/DL — HIGH (ref 70–99)
GLUCOSE SERPL-MCNC: 64 MG/DL — LOW (ref 70–99)
GRAM STN FLD: SIGNIFICANT CHANGE UP
HCO3 BLDA-SCNC: 24 MMOL/L — SIGNIFICANT CHANGE UP (ref 21–28)
HCT VFR BLD CALC: 33.1 % — LOW (ref 34.5–45)
HCT VFR BLD CALC: 34.8 % — SIGNIFICANT CHANGE UP (ref 34.5–45)
HCT VFR BLD CALC: 35.1 % — SIGNIFICANT CHANGE UP (ref 34.5–45)
HCT VFR BLD CALC: 37.1 % — SIGNIFICANT CHANGE UP (ref 34.5–45)
HCT VFR BLD CALC: 37.9 % — SIGNIFICANT CHANGE UP (ref 34.5–45)
HCT VFR BLD CALC: 38.3 % — SIGNIFICANT CHANGE UP (ref 34.5–45)
HCT VFR BLD CALC: 38.9 % — SIGNIFICANT CHANGE UP (ref 34.5–45)
HCT VFR BLD CALC: 39 % — SIGNIFICANT CHANGE UP (ref 34.5–45)
HCT VFR BLD CALC: 39.1 % — SIGNIFICANT CHANGE UP (ref 34.5–45)
HCT VFR BLD CALC: 41.1 % — SIGNIFICANT CHANGE UP (ref 34.5–45)
HCT VFR BLD CALC: 42.5 % — SIGNIFICANT CHANGE UP (ref 34.5–45)
HDLC SERPL-MCNC: 50 MG/DL — LOW
HGB BLD-MCNC: 11 G/DL — LOW (ref 11.5–15.5)
HGB BLD-MCNC: 11.2 G/DL — LOW (ref 11.5–15.5)
HGB BLD-MCNC: 11.4 G/DL — LOW (ref 11.5–15.5)
HGB BLD-MCNC: 11.8 G/DL — SIGNIFICANT CHANGE UP (ref 11.5–15.5)
HGB BLD-MCNC: 11.9 G/DL — SIGNIFICANT CHANGE UP (ref 11.5–15.5)
HGB BLD-MCNC: 12.2 G/DL — SIGNIFICANT CHANGE UP (ref 11.5–15.5)
HGB BLD-MCNC: 12.3 G/DL — SIGNIFICANT CHANGE UP (ref 11.5–15.5)
HGB BLD-MCNC: 12.4 G/DL — SIGNIFICANT CHANGE UP (ref 11.5–15.5)
HGB BLD-MCNC: 12.5 G/DL — SIGNIFICANT CHANGE UP (ref 11.5–15.5)
HGB BLD-MCNC: 12.8 G/DL — SIGNIFICANT CHANGE UP (ref 11.5–15.5)
HGB BLD-MCNC: 13.1 G/DL — SIGNIFICANT CHANGE UP (ref 11.5–15.5)
IMM GRANULOCYTES NFR BLD AUTO: 0.4 % — SIGNIFICANT CHANGE UP (ref 0–0.9)
IMM GRANULOCYTES NFR BLD AUTO: 0.4 % — SIGNIFICANT CHANGE UP (ref 0–0.9)
INR BLD: 1.4 RATIO — HIGH (ref 0.85–1.18)
INR BLD: 1.62 RATIO — HIGH (ref 0.85–1.18)
INR BLD: 1.82 RATIO — HIGH (ref 0.85–1.18)
INR BLD: 1.98 RATIO — HIGH (ref 0.85–1.18)
INR BLD: 10.3 RATIO — CRITICAL HIGH (ref 0.85–1.18)
INR BLD: 2.21 RATIO — HIGH (ref 0.85–1.18)
INR BLD: 2.24 RATIO — HIGH (ref 0.85–1.18)
LACTATE SERPL-SCNC: 1.1 MMOL/L — SIGNIFICANT CHANGE UP (ref 0.7–2)
LACTATE SERPL-SCNC: 1.7 MMOL/L — SIGNIFICANT CHANGE UP (ref 0.7–2)
LACTATE SERPL-SCNC: 2 MMOL/L — SIGNIFICANT CHANGE UP (ref 0.7–2)
LACTATE SERPL-SCNC: 2.2 MMOL/L — HIGH (ref 0.7–2)
LG PLATELETS BLD QL AUTO: SIGNIFICANT CHANGE UP
LIPID PNL WITH DIRECT LDL SERPL: 56 MG/DL — SIGNIFICANT CHANGE UP
LYMPHOCYTES # BLD AUTO: 0.95 K/UL — LOW (ref 1–3.3)
LYMPHOCYTES # BLD AUTO: 1.25 K/UL — SIGNIFICANT CHANGE UP (ref 1–3.3)
LYMPHOCYTES # BLD AUTO: 1.54 K/UL — SIGNIFICANT CHANGE UP (ref 1–3.3)
LYMPHOCYTES # BLD AUTO: 16.4 % — SIGNIFICANT CHANGE UP (ref 13–44)
LYMPHOCYTES # BLD AUTO: 4 % — LOW (ref 13–44)
LYMPHOCYTES # BLD AUTO: 9.3 % — LOW (ref 13–44)
LYMPHOCYTES # FLD: 2 % — SIGNIFICANT CHANGE UP
MAGNESIUM SERPL-MCNC: 1.7 MG/DL — SIGNIFICANT CHANGE UP (ref 1.6–2.6)
MAGNESIUM SERPL-MCNC: 2.2 MG/DL — SIGNIFICANT CHANGE UP (ref 1.6–2.6)
MAGNESIUM SERPL-MCNC: 2.3 MG/DL — SIGNIFICANT CHANGE UP (ref 1.6–2.6)
MANUAL SMEAR VERIFICATION: SIGNIFICANT CHANGE UP
MANUAL SMEAR VERIFICATION: SIGNIFICANT CHANGE UP
MCHC RBC-ENTMCNC: 26.5 PG — LOW (ref 27–34)
MCHC RBC-ENTMCNC: 26.5 PG — LOW (ref 27–34)
MCHC RBC-ENTMCNC: 26.6 PG — LOW (ref 27–34)
MCHC RBC-ENTMCNC: 26.9 PG — LOW (ref 27–34)
MCHC RBC-ENTMCNC: 26.9 PG — LOW (ref 27–34)
MCHC RBC-ENTMCNC: 27.2 PG — SIGNIFICANT CHANGE UP (ref 27–34)
MCHC RBC-ENTMCNC: 27.2 PG — SIGNIFICANT CHANGE UP (ref 27–34)
MCHC RBC-ENTMCNC: 27.4 PG — SIGNIFICANT CHANGE UP (ref 27–34)
MCHC RBC-ENTMCNC: 28.7 PG — SIGNIFICANT CHANGE UP (ref 27–34)
MCHC RBC-ENTMCNC: 30.8 GM/DL — LOW (ref 32–36)
MCHC RBC-ENTMCNC: 31.1 GM/DL — LOW (ref 32–36)
MCHC RBC-ENTMCNC: 31.1 GM/DL — LOW (ref 32–36)
MCHC RBC-ENTMCNC: 31.3 GM/DL — LOW (ref 32–36)
MCHC RBC-ENTMCNC: 31.6 GM/DL — LOW (ref 32–36)
MCHC RBC-ENTMCNC: 31.7 GM/DL — LOW (ref 32–36)
MCHC RBC-ENTMCNC: 32.1 GM/DL — SIGNIFICANT CHANGE UP (ref 32–36)
MCHC RBC-ENTMCNC: 32.5 GM/DL — SIGNIFICANT CHANGE UP (ref 32–36)
MCHC RBC-ENTMCNC: 33.8 GM/DL — SIGNIFICANT CHANGE UP (ref 32–36)
MCV RBC AUTO: 83.6 FL — SIGNIFICANT CHANGE UP (ref 80–100)
MCV RBC AUTO: 83.8 FL — SIGNIFICANT CHANGE UP (ref 80–100)
MCV RBC AUTO: 83.9 FL — SIGNIFICANT CHANGE UP (ref 80–100)
MCV RBC AUTO: 83.9 FL — SIGNIFICANT CHANGE UP (ref 80–100)
MCV RBC AUTO: 84.8 FL — SIGNIFICANT CHANGE UP (ref 80–100)
MCV RBC AUTO: 84.9 FL — SIGNIFICANT CHANGE UP (ref 80–100)
MCV RBC AUTO: 85.1 FL — SIGNIFICANT CHANGE UP (ref 80–100)
MCV RBC AUTO: 85.4 FL — SIGNIFICANT CHANGE UP (ref 80–100)
MCV RBC AUTO: 85.6 FL — SIGNIFICANT CHANGE UP (ref 80–100)
MCV RBC AUTO: 85.9 FL — SIGNIFICANT CHANGE UP (ref 80–100)
MCV RBC AUTO: 86 FL — SIGNIFICANT CHANGE UP (ref 80–100)
MONOCYTES # BLD AUTO: 0.47 K/UL — SIGNIFICANT CHANGE UP (ref 0–0.9)
MONOCYTES # BLD AUTO: 0.66 K/UL — SIGNIFICANT CHANGE UP (ref 0–0.9)
MONOCYTES # BLD AUTO: 1.64 K/UL — HIGH (ref 0–0.9)
MONOCYTES NFR BLD AUTO: 12.2 % — SIGNIFICANT CHANGE UP (ref 2–14)
MONOCYTES NFR BLD AUTO: 2 % — SIGNIFICANT CHANGE UP (ref 2–14)
MONOCYTES NFR BLD AUTO: 7 % — SIGNIFICANT CHANGE UP (ref 2–14)
NEUTROPHILS # BLD AUTO: 10.38 K/UL — HIGH (ref 1.8–7.4)
NEUTROPHILS # BLD AUTO: 22.23 K/UL — HIGH (ref 1.8–7.4)
NEUTROPHILS # BLD AUTO: 6.92 K/UL — SIGNIFICANT CHANGE UP (ref 1.8–7.4)
NEUTROPHILS NFR BLD AUTO: 74 % — SIGNIFICANT CHANGE UP (ref 43–77)
NEUTROPHILS NFR BLD AUTO: 77.3 % — HIGH (ref 43–77)
NEUTROPHILS NFR BLD AUTO: 92 % — HIGH (ref 43–77)
NEUTROPHILS-BODY FLUID: 98 % — SIGNIFICANT CHANGE UP
NEUTS BAND # BLD: 2 % — SIGNIFICANT CHANGE UP (ref 0–8)
NON HDL CHOLESTEROL: 73 MG/DL — SIGNIFICANT CHANGE UP
NRBC # BLD: 0 /100 WBCS — SIGNIFICANT CHANGE UP (ref 0–0)
NRBC # BLD: SIGNIFICANT CHANGE UP /100 WBCS (ref 0–0)
PCO2 BLDA: 35 MMHG — SIGNIFICANT CHANGE UP (ref 32–45)
PH BLDA: 7.44 — SIGNIFICANT CHANGE UP (ref 7.35–7.45)
PHOSPHATE SERPL-MCNC: 3.3 MG/DL — SIGNIFICANT CHANGE UP (ref 2.5–4.5)
PHOSPHATE SERPL-MCNC: 4.5 MG/DL — SIGNIFICANT CHANGE UP (ref 2.5–4.5)
PHOSPHATE SERPL-MCNC: 5 MG/DL — HIGH (ref 2.5–4.5)
PLAT MORPH BLD: NORMAL — SIGNIFICANT CHANGE UP
PLAT MORPH BLD: NORMAL — SIGNIFICANT CHANGE UP
PLATELET # BLD AUTO: 139 K/UL — LOW (ref 150–400)
PLATELET # BLD AUTO: 140 K/UL — LOW (ref 150–400)
PLATELET # BLD AUTO: 183 K/UL — SIGNIFICANT CHANGE UP (ref 150–400)
PLATELET # BLD AUTO: 201 K/UL — SIGNIFICANT CHANGE UP (ref 150–400)
PLATELET # BLD AUTO: 202 K/UL — SIGNIFICANT CHANGE UP (ref 150–400)
PLATELET # BLD AUTO: 203 K/UL — SIGNIFICANT CHANGE UP (ref 150–400)
PLATELET # BLD AUTO: 227 K/UL — SIGNIFICANT CHANGE UP (ref 150–400)
PLATELET # BLD AUTO: 234 K/UL — SIGNIFICANT CHANGE UP (ref 150–400)
PLATELET # BLD AUTO: 234 K/UL — SIGNIFICANT CHANGE UP (ref 150–400)
PLATELET # BLD AUTO: 236 K/UL — SIGNIFICANT CHANGE UP (ref 150–400)
PLATELET # BLD AUTO: 255 K/UL — SIGNIFICANT CHANGE UP (ref 150–400)
PLATELET COUNT - ESTIMATE: NORMAL — SIGNIFICANT CHANGE UP
PO2 BLDA: 112 MMHG — HIGH (ref 83–108)
POTASSIUM SERPL-MCNC: 3.2 MMOL/L — LOW (ref 3.5–5.3)
POTASSIUM SERPL-MCNC: 3.6 MMOL/L — SIGNIFICANT CHANGE UP (ref 3.5–5.3)
POTASSIUM SERPL-MCNC: 3.7 MMOL/L — SIGNIFICANT CHANGE UP (ref 3.5–5.3)
POTASSIUM SERPL-MCNC: 3.8 MMOL/L — SIGNIFICANT CHANGE UP (ref 3.5–5.3)
POTASSIUM SERPL-MCNC: 3.8 MMOL/L — SIGNIFICANT CHANGE UP (ref 3.5–5.3)
POTASSIUM SERPL-MCNC: 3.9 MMOL/L — SIGNIFICANT CHANGE UP (ref 3.5–5.3)
POTASSIUM SERPL-MCNC: 4 MMOL/L — SIGNIFICANT CHANGE UP (ref 3.5–5.3)
POTASSIUM SERPL-MCNC: 4.1 MMOL/L — SIGNIFICANT CHANGE UP (ref 3.5–5.3)
POTASSIUM SERPL-MCNC: 4.1 MMOL/L — SIGNIFICANT CHANGE UP (ref 3.5–5.3)
POTASSIUM SERPL-MCNC: 4.3 MMOL/L — SIGNIFICANT CHANGE UP (ref 3.5–5.3)
POTASSIUM SERPL-MCNC: 4.3 MMOL/L — SIGNIFICANT CHANGE UP (ref 3.5–5.3)
POTASSIUM SERPL-MCNC: 4.4 MMOL/L — SIGNIFICANT CHANGE UP (ref 3.5–5.3)
POTASSIUM SERPL-SCNC: 3.2 MMOL/L — LOW (ref 3.5–5.3)
POTASSIUM SERPL-SCNC: 3.6 MMOL/L — SIGNIFICANT CHANGE UP (ref 3.5–5.3)
POTASSIUM SERPL-SCNC: 3.7 MMOL/L — SIGNIFICANT CHANGE UP (ref 3.5–5.3)
POTASSIUM SERPL-SCNC: 3.8 MMOL/L — SIGNIFICANT CHANGE UP (ref 3.5–5.3)
POTASSIUM SERPL-SCNC: 3.8 MMOL/L — SIGNIFICANT CHANGE UP (ref 3.5–5.3)
POTASSIUM SERPL-SCNC: 3.9 MMOL/L — SIGNIFICANT CHANGE UP (ref 3.5–5.3)
POTASSIUM SERPL-SCNC: 4 MMOL/L — SIGNIFICANT CHANGE UP (ref 3.5–5.3)
POTASSIUM SERPL-SCNC: 4.1 MMOL/L — SIGNIFICANT CHANGE UP (ref 3.5–5.3)
POTASSIUM SERPL-SCNC: 4.1 MMOL/L — SIGNIFICANT CHANGE UP (ref 3.5–5.3)
POTASSIUM SERPL-SCNC: 4.3 MMOL/L — SIGNIFICANT CHANGE UP (ref 3.5–5.3)
POTASSIUM SERPL-SCNC: 4.3 MMOL/L — SIGNIFICANT CHANGE UP (ref 3.5–5.3)
POTASSIUM SERPL-SCNC: 4.4 MMOL/L — SIGNIFICANT CHANGE UP (ref 3.5–5.3)
PROT SERPL-MCNC: 6.3 GM/DL — SIGNIFICANT CHANGE UP (ref 6–8.3)
PROT SERPL-MCNC: 6.4 GM/DL — SIGNIFICANT CHANGE UP (ref 6–8.3)
PROT SERPL-MCNC: 6.8 GM/DL — SIGNIFICANT CHANGE UP (ref 6–8.3)
PROT SERPL-MCNC: 6.9 GM/DL — SIGNIFICANT CHANGE UP (ref 6–8.3)
PROT SERPL-MCNC: 7 GM/DL — SIGNIFICANT CHANGE UP (ref 6–8.3)
PROT SERPL-MCNC: 7.5 GM/DL — SIGNIFICANT CHANGE UP (ref 6–8.3)
PROT SERPL-MCNC: 7.6 GM/DL — SIGNIFICANT CHANGE UP (ref 6–8.3)
PROTHROM AB SERPL-ACNC: 108.7 SEC — HIGH (ref 9.5–13)
PROTHROM AB SERPL-ACNC: 15.7 SEC — HIGH (ref 9.5–13)
PROTHROM AB SERPL-ACNC: 18.1 SEC — HIGH (ref 9.5–13)
PROTHROM AB SERPL-ACNC: 20.2 SEC — HIGH (ref 9.5–13)
PROTHROM AB SERPL-ACNC: 21.9 SEC — HIGH (ref 9.5–13)
PROTHROM AB SERPL-ACNC: 24.4 SEC — HIGH (ref 9.5–13)
PROTHROM AB SERPL-ACNC: 24.7 SEC — HIGH (ref 9.5–13)
RBC # BLD: 3.9 M/UL — SIGNIFICANT CHANGE UP (ref 3.8–5.2)
RBC # BLD: 4.05 M/UL — SIGNIFICANT CHANGE UP (ref 3.8–5.2)
RBC # BLD: 4.19 M/UL — SIGNIFICANT CHANGE UP (ref 3.8–5.2)
RBC # BLD: 4.19 M/UL — SIGNIFICANT CHANGE UP (ref 3.8–5.2)
RBC # BLD: 4.42 M/UL — SIGNIFICANT CHANGE UP (ref 3.8–5.2)
RBC # BLD: 4.43 M/UL — SIGNIFICANT CHANGE UP (ref 3.8–5.2)
RBC # BLD: 4.57 M/UL — SIGNIFICANT CHANGE UP (ref 3.8–5.2)
RBC # BLD: 4.58 M/UL — SIGNIFICANT CHANGE UP (ref 3.8–5.2)
RBC # BLD: 4.6 M/UL — SIGNIFICANT CHANGE UP (ref 3.8–5.2)
RBC # BLD: 4.66 M/UL — SIGNIFICANT CHANGE UP (ref 3.8–5.2)
RBC # BLD: 4.81 M/UL — SIGNIFICANT CHANGE UP (ref 3.8–5.2)
RBC # BLD: 4.94 M/UL — SIGNIFICANT CHANGE UP (ref 3.8–5.2)
RBC # FLD: 16.3 % — HIGH (ref 10.3–14.5)
RBC # FLD: 16.4 % — HIGH (ref 10.3–14.5)
RBC # FLD: 16.5 % — HIGH (ref 10.3–14.5)
RBC # FLD: 16.5 % — HIGH (ref 10.3–14.5)
RBC # FLD: 16.6 % — HIGH (ref 10.3–14.5)
RBC # FLD: 16.7 % — HIGH (ref 10.3–14.5)
RBC # FLD: 16.8 % — HIGH (ref 10.3–14.5)
RBC # FLD: 16.9 % — HIGH (ref 10.3–14.5)
RBC # FLD: 17 % — HIGH (ref 10.3–14.5)
RBC BLD AUTO: NORMAL — SIGNIFICANT CHANGE UP
RBC BLD AUTO: SIGNIFICANT CHANGE UP
RCV VOL RI: HIGH /UL (ref 0–0)
RETICS #: 54.9 K/UL — SIGNIFICANT CHANGE UP (ref 25–125)
RETICS/RBC NFR: 1.3 % — SIGNIFICANT CHANGE UP (ref 0.5–2.5)
SAO2 % BLDA: 98 % — SIGNIFICANT CHANGE UP (ref 94–98)
SODIUM SERPL-SCNC: 138 MMOL/L — SIGNIFICANT CHANGE UP (ref 135–145)
SODIUM SERPL-SCNC: 138 MMOL/L — SIGNIFICANT CHANGE UP (ref 135–145)
SODIUM SERPL-SCNC: 139 MMOL/L — SIGNIFICANT CHANGE UP (ref 135–145)
SODIUM SERPL-SCNC: 140 MMOL/L — SIGNIFICANT CHANGE UP (ref 135–145)
SODIUM SERPL-SCNC: 143 MMOL/L — SIGNIFICANT CHANGE UP (ref 135–145)
SODIUM SERPL-SCNC: 144 MMOL/L — SIGNIFICANT CHANGE UP (ref 135–145)
SODIUM SERPL-SCNC: 146 MMOL/L — HIGH (ref 135–145)
SPECIMEN SOURCE: SIGNIFICANT CHANGE UP
SYNOVIAL CRYSTALS CLARITY: ABNORMAL
SYNOVIAL CRYSTALS COLOR: ABNORMAL
SYNOVIAL CRYSTALS ID: SIGNIFICANT CHANGE UP
SYNOVIAL CRYSTALS TUBE: SIGNIFICANT CHANGE UP
TOTAL NUCLEATED CELL COUNT, BODY FLUID: SIGNIFICANT CHANGE UP /UL
TRIGL SERPL-MCNC: 193 MG/DL — HIGH
TRIGL SERPL-MCNC: 91 MG/DL — SIGNIFICANT CHANGE UP
TROPONIN I, HIGH SENSITIVITY RESULT: 204.77 NG/L — HIGH
TROPONIN I, HIGH SENSITIVITY RESULT: 204.9 NG/L — HIGH
TROPONIN I, HIGH SENSITIVITY RESULT: 231 NG/L — HIGH
TROPONIN I, HIGH SENSITIVITY RESULT: 243.15 NG/L — HIGH
TROPONIN I, HIGH SENSITIVITY RESULT: 248.41 NG/L — HIGH
TSH SERPL-MCNC: 0.6 UU/ML — SIGNIFICANT CHANGE UP (ref 0.34–4.82)
VIT B12 SERPL-MCNC: 378 PG/ML — SIGNIFICANT CHANGE UP (ref 232–1245)
WBC # BLD: 11.4 K/UL — HIGH (ref 3.8–10.5)
WBC # BLD: 12.65 K/UL — HIGH (ref 3.8–10.5)
WBC # BLD: 13.43 K/UL — HIGH (ref 3.8–10.5)
WBC # BLD: 13.73 K/UL — HIGH (ref 3.8–10.5)
WBC # BLD: 15.03 K/UL — HIGH (ref 3.8–10.5)
WBC # BLD: 15.26 K/UL — HIGH (ref 3.8–10.5)
WBC # BLD: 22 K/UL — HIGH (ref 3.8–10.5)
WBC # BLD: 23.65 K/UL — HIGH (ref 3.8–10.5)
WBC # BLD: 8.03 K/UL — SIGNIFICANT CHANGE UP (ref 3.8–10.5)
WBC # BLD: 9.37 K/UL — SIGNIFICANT CHANGE UP (ref 3.8–10.5)
WBC # BLD: 9.69 K/UL — SIGNIFICANT CHANGE UP (ref 3.8–10.5)
WBC # FLD AUTO: 11.4 K/UL — HIGH (ref 3.8–10.5)
WBC # FLD AUTO: 12.65 K/UL — HIGH (ref 3.8–10.5)
WBC # FLD AUTO: 13.43 K/UL — HIGH (ref 3.8–10.5)
WBC # FLD AUTO: 13.73 K/UL — HIGH (ref 3.8–10.5)
WBC # FLD AUTO: 15.03 K/UL — HIGH (ref 3.8–10.5)
WBC # FLD AUTO: 15.26 K/UL — HIGH (ref 3.8–10.5)
WBC # FLD AUTO: 22 K/UL — HIGH (ref 3.8–10.5)
WBC # FLD AUTO: 23.65 K/UL — HIGH (ref 3.8–10.5)
WBC # FLD AUTO: 8.03 K/UL — SIGNIFICANT CHANGE UP (ref 3.8–10.5)
WBC # FLD AUTO: 9.37 K/UL — SIGNIFICANT CHANGE UP (ref 3.8–10.5)
WBC # FLD AUTO: 9.69 K/UL — SIGNIFICANT CHANGE UP (ref 3.8–10.5)

## 2024-01-01 PROCEDURE — 99239 HOSP IP/OBS DSCHRG MGMT >30: CPT

## 2024-01-01 PROCEDURE — 70544 MR ANGIOGRAPHY HEAD W/O DYE: CPT | Mod: 26,XU

## 2024-01-01 PROCEDURE — 99232 SBSQ HOSP IP/OBS MODERATE 35: CPT

## 2024-01-01 PROCEDURE — 97116 GAIT TRAINING THERAPY: CPT | Mod: GP

## 2024-01-01 PROCEDURE — 99233 SBSQ HOSP IP/OBS HIGH 50: CPT

## 2024-01-01 PROCEDURE — 84443 ASSAY THYROID STIM HORMONE: CPT

## 2024-01-01 PROCEDURE — 71045 X-RAY EXAM CHEST 1 VIEW: CPT | Mod: 26,77

## 2024-01-01 PROCEDURE — 71045 X-RAY EXAM CHEST 1 VIEW: CPT | Mod: 26

## 2024-01-01 PROCEDURE — 93306 TTE W/DOPPLER COMPLETE: CPT | Mod: 26

## 2024-01-01 PROCEDURE — 99497 ADVNCD CARE PLAN 30 MIN: CPT | Mod: 25

## 2024-01-01 PROCEDURE — 92523 SPEECH SOUND LANG COMPREHEN: CPT | Mod: GN

## 2024-01-01 PROCEDURE — 86900 BLOOD TYPING SEROLOGIC ABO: CPT

## 2024-01-01 PROCEDURE — 85045 AUTOMATED RETICULOCYTE COUNT: CPT

## 2024-01-01 PROCEDURE — 99291 CRITICAL CARE FIRST HOUR: CPT

## 2024-01-01 PROCEDURE — 89051 BODY FLUID CELL COUNT: CPT

## 2024-01-01 PROCEDURE — 74250 X-RAY XM SM INT 1CNTRST STD: CPT | Mod: 26

## 2024-01-01 PROCEDURE — 74018 RADEX ABDOMEN 1 VIEW: CPT | Mod: 26,59

## 2024-01-01 PROCEDURE — 80076 HEPATIC FUNCTION PANEL: CPT

## 2024-01-01 PROCEDURE — 87040 BLOOD CULTURE FOR BACTERIA: CPT

## 2024-01-01 PROCEDURE — 85610 PROTHROMBIN TIME: CPT

## 2024-01-01 PROCEDURE — 85652 RBC SED RATE AUTOMATED: CPT

## 2024-01-01 PROCEDURE — 73562 X-RAY EXAM OF KNEE 3: CPT | Mod: 26,LT

## 2024-01-01 PROCEDURE — 86140 C-REACTIVE PROTEIN: CPT

## 2024-01-01 PROCEDURE — 36600 WITHDRAWAL OF ARTERIAL BLOOD: CPT

## 2024-01-01 PROCEDURE — 74176 CT ABD & PELVIS W/O CONTRAST: CPT | Mod: MC

## 2024-01-01 PROCEDURE — 97530 THERAPEUTIC ACTIVITIES: CPT | Mod: GP

## 2024-01-01 PROCEDURE — 83036 HEMOGLOBIN GLYCOSYLATED A1C: CPT

## 2024-01-01 PROCEDURE — 71045 X-RAY EXAM CHEST 1 VIEW: CPT

## 2024-01-01 PROCEDURE — 83735 ASSAY OF MAGNESIUM: CPT

## 2024-01-01 PROCEDURE — 93010 ELECTROCARDIOGRAM REPORT: CPT

## 2024-01-01 PROCEDURE — 74176 CT ABD & PELVIS W/O CONTRAST: CPT | Mod: 26

## 2024-01-01 PROCEDURE — 85730 THROMBOPLASTIN TIME PARTIAL: CPT

## 2024-01-01 PROCEDURE — 86850 RBC ANTIBODY SCREEN: CPT

## 2024-01-01 PROCEDURE — 99497 ADVNCD CARE PLAN 30 MIN: CPT

## 2024-01-01 PROCEDURE — 73562 X-RAY EXAM OF KNEE 3: CPT | Mod: LT

## 2024-01-01 PROCEDURE — 87493 C DIFF AMPLIFIED PROBE: CPT

## 2024-01-01 PROCEDURE — 92610 EVALUATE SWALLOWING FUNCTION: CPT | Mod: GN

## 2024-01-01 PROCEDURE — 87070 CULTURE OTHR SPECIMN AEROBIC: CPT

## 2024-01-01 PROCEDURE — 74250 X-RAY XM SM INT 1CNTRST STD: CPT

## 2024-01-01 PROCEDURE — 84484 ASSAY OF TROPONIN QUANT: CPT

## 2024-01-01 PROCEDURE — 93306 TTE W/DOPPLER COMPLETE: CPT

## 2024-01-01 PROCEDURE — 83605 ASSAY OF LACTIC ACID: CPT

## 2024-01-01 PROCEDURE — 87507 IADNA-DNA/RNA PROBE TQ 12-25: CPT

## 2024-01-01 PROCEDURE — 85027 COMPLETE CBC AUTOMATED: CPT

## 2024-01-01 PROCEDURE — 93880 EXTRACRANIAL BILAT STUDY: CPT

## 2024-01-01 PROCEDURE — 70551 MRI BRAIN STEM W/O DYE: CPT | Mod: 26

## 2024-01-01 PROCEDURE — 70547 MR ANGIOGRAPHY NECK W/O DYE: CPT | Mod: MC

## 2024-01-01 PROCEDURE — 74018 RADEX ABDOMEN 1 VIEW: CPT | Mod: 26

## 2024-01-01 PROCEDURE — 84100 ASSAY OF PHOSPHORUS: CPT

## 2024-01-01 PROCEDURE — 73700 CT LOWER EXTREMITY W/O DYE: CPT | Mod: MC,LT

## 2024-01-01 PROCEDURE — 85025 COMPLETE CBC W/AUTO DIFF WBC: CPT

## 2024-01-01 PROCEDURE — 70551 MRI BRAIN STEM W/O DYE: CPT | Mod: MC

## 2024-01-01 PROCEDURE — 84478 ASSAY OF TRIGLYCERIDES: CPT

## 2024-01-01 PROCEDURE — 93880 EXTRACRANIAL BILAT STUDY: CPT | Mod: 26

## 2024-01-01 PROCEDURE — 82803 BLOOD GASES ANY COMBINATION: CPT

## 2024-01-01 PROCEDURE — 74018 RADEX ABDOMEN 1 VIEW: CPT

## 2024-01-01 PROCEDURE — 82962 GLUCOSE BLOOD TEST: CPT

## 2024-01-01 PROCEDURE — 80061 LIPID PANEL: CPT

## 2024-01-01 PROCEDURE — 99223 1ST HOSP IP/OBS HIGH 75: CPT

## 2024-01-01 PROCEDURE — ZZZZZ: CPT

## 2024-01-01 PROCEDURE — 99498 ADVNCD CARE PLAN ADDL 30 MIN: CPT

## 2024-01-01 PROCEDURE — 89060 EXAM SYNOVIAL FLUID CRYSTALS: CPT

## 2024-01-01 PROCEDURE — 97162 PT EVAL MOD COMPLEX 30 MIN: CPT | Mod: GP

## 2024-01-01 PROCEDURE — 73700 CT LOWER EXTREMITY W/O DYE: CPT | Mod: 26,LT

## 2024-01-01 PROCEDURE — 87075 CULTR BACTERIA EXCEPT BLOOD: CPT

## 2024-01-01 PROCEDURE — 70450 CT HEAD/BRAIN W/O DYE: CPT | Mod: 26,MC

## 2024-01-01 PROCEDURE — 76376 3D RENDER W/INTRP POSTPROCES: CPT

## 2024-01-01 PROCEDURE — 93280 PM DEVICE PROGR EVAL DUAL: CPT | Mod: 26

## 2024-01-01 PROCEDURE — 80048 BASIC METABOLIC PNL TOTAL CA: CPT

## 2024-01-01 PROCEDURE — 86901 BLOOD TYPING SEROLOGIC RH(D): CPT

## 2024-01-01 PROCEDURE — 36415 COLL VENOUS BLD VENIPUNCTURE: CPT

## 2024-01-01 PROCEDURE — 70547 MR ANGIOGRAPHY NECK W/O DYE: CPT | Mod: 26

## 2024-01-01 PROCEDURE — 76376 3D RENDER W/INTRP POSTPROCES: CPT | Mod: 26

## 2024-01-01 PROCEDURE — 80053 COMPREHEN METABOLIC PANEL: CPT

## 2024-01-01 PROCEDURE — 70544 MR ANGIOGRAPHY HEAD W/O DYE: CPT | Mod: MC

## 2024-01-01 PROCEDURE — 82607 VITAMIN B-12: CPT

## 2024-01-01 RX ORDER — SODIUM CHLORIDE 9 MG/ML
1000 INJECTION INTRAMUSCULAR; INTRAVENOUS; SUBCUTANEOUS ONCE
Refills: 0 | Status: DISCONTINUED | OUTPATIENT
Start: 2024-01-01 | End: 2024-01-01

## 2024-01-01 RX ORDER — INSULIN GLARGINE 100 [IU]/ML
10 INJECTION, SOLUTION SUBCUTANEOUS AT BEDTIME
Refills: 0 | Status: DISCONTINUED | OUTPATIENT
Start: 2024-01-01 | End: 2024-01-01

## 2024-01-01 RX ORDER — DILTIAZEM HCL 120 MG
300 CAPSULE, EXT RELEASE 24 HR ORAL DAILY
Refills: 0 | Status: DISCONTINUED | OUTPATIENT
Start: 2024-01-01 | End: 2024-01-01

## 2024-01-01 RX ORDER — SODIUM CHLORIDE 9 MG/ML
1000 INJECTION INTRAMUSCULAR; INTRAVENOUS; SUBCUTANEOUS
Refills: 0 | Status: DISCONTINUED | OUTPATIENT
Start: 2024-01-01 | End: 2024-01-01

## 2024-01-01 RX ORDER — ROBINUL 0.2 MG/ML
0.4 INJECTION INTRAMUSCULAR; INTRAVENOUS
Refills: 0 | Status: DISCONTINUED | OUTPATIENT
Start: 2024-01-01 | End: 2024-01-01

## 2024-01-01 RX ORDER — DEXTROSE 50 % IN WATER 50 %
25 SYRINGE (ML) INTRAVENOUS ONCE
Refills: 0 | Status: DISCONTINUED | OUTPATIENT
Start: 2024-01-01 | End: 2024-01-01

## 2024-01-01 RX ORDER — DILTIAZEM HCL 120 MG
60 CAPSULE, EXT RELEASE 24 HR ORAL THREE TIMES A DAY
Refills: 0 | Status: DISCONTINUED | OUTPATIENT
Start: 2024-01-01 | End: 2024-01-01

## 2024-01-01 RX ORDER — DILTIAZEM HCL 120 MG
10 CAPSULE, EXT RELEASE 24 HR ORAL EVERY 8 HOURS
Refills: 0 | Status: DISCONTINUED | OUTPATIENT
Start: 2024-01-01 | End: 2024-01-01

## 2024-01-01 RX ORDER — PIPERACILLIN AND TAZOBACTAM 4; .5 G/20ML; G/20ML
3.38 INJECTION, POWDER, LYOPHILIZED, FOR SOLUTION INTRAVENOUS ONCE
Refills: 0 | Status: COMPLETED | OUTPATIENT
Start: 2024-01-01 | End: 2024-01-01

## 2024-01-01 RX ORDER — SODIUM CHLORIDE 9 MG/ML
1000 INJECTION, SOLUTION INTRAVENOUS
Refills: 0 | Status: DISCONTINUED | OUTPATIENT
Start: 2024-01-01 | End: 2024-01-01

## 2024-01-01 RX ORDER — TRAMADOL HYDROCHLORIDE 50 MG/1
25 TABLET ORAL ONCE
Refills: 0 | Status: DISCONTINUED | OUTPATIENT
Start: 2024-01-01 | End: 2024-01-01

## 2024-01-01 RX ORDER — ACETAMINOPHEN 500 MG
1000 TABLET ORAL ONCE
Refills: 0 | Status: COMPLETED | OUTPATIENT
Start: 2024-01-01 | End: 2024-01-01

## 2024-01-01 RX ORDER — LABETALOL HCL 100 MG
100 TABLET ORAL ONCE
Refills: 0 | Status: COMPLETED | OUTPATIENT
Start: 2024-01-01 | End: 2024-01-01

## 2024-01-01 RX ORDER — ENOXAPARIN SODIUM 100 MG/ML
80 INJECTION SUBCUTANEOUS EVERY 24 HOURS
Refills: 0 | Status: DISCONTINUED | OUTPATIENT
Start: 2024-01-01 | End: 2024-01-01

## 2024-01-01 RX ORDER — ELECTROLYTE SOLUTION,INJ
1 VIAL (ML) INTRAVENOUS
Refills: 0 | Status: DISCONTINUED | OUTPATIENT
Start: 2024-01-01 | End: 2024-01-01

## 2024-01-01 RX ORDER — ENOXAPARIN SODIUM 100 MG/ML
80 INJECTION SUBCUTANEOUS ONCE
Refills: 0 | Status: COMPLETED | OUTPATIENT
Start: 2024-01-01 | End: 2024-01-01

## 2024-01-01 RX ORDER — POTASSIUM CHLORIDE 20 MEQ
40 PACKET (EA) ORAL ONCE
Refills: 0 | Status: COMPLETED | OUTPATIENT
Start: 2024-01-01 | End: 2024-01-01

## 2024-01-01 RX ORDER — POTASSIUM CHLORIDE 20 MEQ
10 PACKET (EA) ORAL ONCE
Refills: 0 | Status: COMPLETED | OUTPATIENT
Start: 2024-01-01 | End: 2024-01-01

## 2024-01-01 RX ORDER — APIXABAN 2.5 MG/1
5 TABLET, FILM COATED ORAL
Refills: 0 | Status: DISCONTINUED | OUTPATIENT
Start: 2024-01-01 | End: 2024-01-01

## 2024-01-01 RX ORDER — ATORVASTATIN CALCIUM 80 MG/1
1 TABLET, FILM COATED ORAL
Refills: 0 | DISCHARGE

## 2024-01-01 RX ORDER — SITAGLIPTIN 50 MG/1
1 TABLET, FILM COATED ORAL
Qty: 0 | Refills: 0 | DISCHARGE

## 2024-01-01 RX ORDER — DILTIAZEM HCL 120 MG
60 CAPSULE, EXT RELEASE 24 HR ORAL
Refills: 0 | Status: DISCONTINUED | OUTPATIENT
Start: 2024-01-01 | End: 2024-01-01

## 2024-01-01 RX ORDER — I.V. FAT EMULSION 20 G/100ML
0.92 EMULSION INTRAVENOUS
Qty: 75 | Refills: 0 | Status: DISCONTINUED | OUTPATIENT
Start: 2024-01-01 | End: 2024-01-01

## 2024-01-01 RX ORDER — LANOLIN ALCOHOL/MO/W.PET/CERES
3 CREAM (GRAM) TOPICAL AT BEDTIME
Refills: 0 | Status: DISCONTINUED | OUTPATIENT
Start: 2024-01-01 | End: 2024-01-01

## 2024-01-01 RX ORDER — METOCLOPRAMIDE HCL 10 MG
10 TABLET ORAL THREE TIMES A DAY
Refills: 0 | Status: DISCONTINUED | OUTPATIENT
Start: 2024-01-01 | End: 2024-01-01

## 2024-01-01 RX ORDER — SENNA PLUS 8.6 MG/1
2 TABLET ORAL AT BEDTIME
Refills: 0 | Status: DISCONTINUED | OUTPATIENT
Start: 2024-01-01 | End: 2024-01-01

## 2024-01-01 RX ORDER — METOPROLOL TARTRATE 50 MG
1 TABLET ORAL
Refills: 0 | DISCHARGE

## 2024-01-01 RX ORDER — ATORVASTATIN CALCIUM 80 MG/1
40 TABLET, FILM COATED ORAL AT BEDTIME
Refills: 0 | Status: DISCONTINUED | OUTPATIENT
Start: 2024-01-01 | End: 2024-01-01

## 2024-01-01 RX ORDER — HYDROMORPHONE HYDROCHLORIDE 2 MG/ML
0.5 INJECTION INTRAMUSCULAR; INTRAVENOUS; SUBCUTANEOUS
Refills: 0 | Status: DISCONTINUED | OUTPATIENT
Start: 2024-01-01 | End: 2024-01-01

## 2024-01-01 RX ORDER — INSULIN LISPRO 100/ML
VIAL (ML) SUBCUTANEOUS EVERY 6 HOURS
Refills: 0 | Status: DISCONTINUED | OUTPATIENT
Start: 2024-01-01 | End: 2024-01-01

## 2024-01-01 RX ORDER — IOHEXOL 300 MG/ML
30 INJECTION, SOLUTION INTRAVENOUS ONCE
Refills: 0 | Status: COMPLETED | OUTPATIENT
Start: 2024-01-01 | End: 2024-01-01

## 2024-01-01 RX ORDER — ACETAMINOPHEN 500 MG
2 TABLET ORAL
Refills: 0 | DISCHARGE

## 2024-01-01 RX ORDER — GLUCAGON INJECTION, SOLUTION 0.5 MG/.1ML
1 INJECTION, SOLUTION SUBCUTANEOUS ONCE
Refills: 0 | Status: DISCONTINUED | OUTPATIENT
Start: 2024-01-01 | End: 2024-01-01

## 2024-01-01 RX ORDER — DILTIAZEM HCL 120 MG
180 CAPSULE, EXT RELEASE 24 HR ORAL DAILY
Refills: 0 | Status: DISCONTINUED | OUTPATIENT
Start: 2024-01-01 | End: 2024-01-01

## 2024-01-01 RX ORDER — LIDOCAINE 4 G/100G
1 CREAM TOPICAL DAILY
Refills: 0 | Status: DISCONTINUED | OUTPATIENT
Start: 2024-01-01 | End: 2024-01-01

## 2024-01-01 RX ORDER — HYDROMORPHONE HYDROCHLORIDE 2 MG/ML
0.2 INJECTION INTRAMUSCULAR; INTRAVENOUS; SUBCUTANEOUS EVERY 4 HOURS
Refills: 0 | Status: DISCONTINUED | OUTPATIENT
Start: 2024-01-01 | End: 2024-01-01

## 2024-01-01 RX ORDER — INSULIN LISPRO 100/ML
VIAL (ML) SUBCUTANEOUS AT BEDTIME
Refills: 0 | Status: DISCONTINUED | OUTPATIENT
Start: 2024-01-01 | End: 2024-01-01

## 2024-01-01 RX ORDER — MECLIZINE HCL 12.5 MG
25 TABLET ORAL EVERY 8 HOURS
Refills: 0 | Status: DISCONTINUED | OUTPATIENT
Start: 2024-01-01 | End: 2024-01-01

## 2024-01-01 RX ORDER — ENOXAPARIN SODIUM 100 MG/ML
90 INJECTION SUBCUTANEOUS ONCE
Refills: 0 | Status: DISCONTINUED | OUTPATIENT
Start: 2024-01-01 | End: 2024-01-01

## 2024-01-01 RX ORDER — DEXTROSE 50 % IN WATER 50 %
15 SYRINGE (ML) INTRAVENOUS ONCE
Refills: 0 | Status: DISCONTINUED | OUTPATIENT
Start: 2024-01-01 | End: 2024-01-01

## 2024-01-01 RX ORDER — ONDANSETRON 8 MG/1
4 TABLET, FILM COATED ORAL EVERY 8 HOURS
Refills: 0 | Status: DISCONTINUED | OUTPATIENT
Start: 2024-01-01 | End: 2024-01-01

## 2024-01-01 RX ORDER — DEXTROSE 50 % IN WATER 50 %
12.5 SYRINGE (ML) INTRAVENOUS ONCE
Refills: 0 | Status: DISCONTINUED | OUTPATIENT
Start: 2024-01-01 | End: 2024-01-01

## 2024-01-01 RX ORDER — POLYETHYLENE GLYCOL 3350 17 G/17G
17 POWDER, FOR SOLUTION ORAL DAILY
Refills: 0 | Status: DISCONTINUED | OUTPATIENT
Start: 2024-01-01 | End: 2024-01-01

## 2024-01-01 RX ORDER — INSULIN LISPRO 100/ML
VIAL (ML) SUBCUTANEOUS
Refills: 0 | Status: DISCONTINUED | OUTPATIENT
Start: 2024-01-01 | End: 2024-01-01

## 2024-01-01 RX ORDER — PIPERACILLIN AND TAZOBACTAM 4; .5 G/20ML; G/20ML
3.38 INJECTION, POWDER, LYOPHILIZED, FOR SOLUTION INTRAVENOUS EVERY 8 HOURS
Refills: 0 | Status: DISCONTINUED | OUTPATIENT
Start: 2024-01-01 | End: 2024-01-01

## 2024-01-01 RX ORDER — DEXTROSE 50 % IN WATER 50 %
12.5 SYRINGE (ML) INTRAVENOUS ONCE
Refills: 0 | Status: COMPLETED | OUTPATIENT
Start: 2024-01-01 | End: 2024-01-01

## 2024-01-01 RX ORDER — WARFARIN SODIUM 2.5 MG/1
0.5 TABLET ORAL
Refills: 0 | DISCHARGE

## 2024-01-01 RX ORDER — METOPROLOL TARTRATE 50 MG
50 TABLET ORAL DAILY
Refills: 0 | Status: DISCONTINUED | OUTPATIENT
Start: 2024-01-01 | End: 2024-01-01

## 2024-01-01 RX ORDER — ACETAMINOPHEN 500 MG
650 TABLET ORAL EVERY 6 HOURS
Refills: 0 | Status: DISCONTINUED | OUTPATIENT
Start: 2024-01-01 | End: 2024-01-01

## 2024-01-01 RX ORDER — OXYCODONE HYDROCHLORIDE 5 MG/1
5 TABLET ORAL EVERY 6 HOURS
Refills: 0 | Status: DISCONTINUED | OUTPATIENT
Start: 2024-01-01 | End: 2024-01-01

## 2024-01-01 RX ORDER — LABETALOL HCL 100 MG
5 TABLET ORAL ONCE
Refills: 0 | Status: COMPLETED | OUTPATIENT
Start: 2024-01-01 | End: 2024-01-01

## 2024-01-01 RX ORDER — METOPROLOL TARTRATE 50 MG
5 TABLET ORAL ONCE
Refills: 0 | Status: COMPLETED | OUTPATIENT
Start: 2024-01-01 | End: 2024-01-01

## 2024-01-01 RX ORDER — PHYTONADIONE (VIT K1) 5 MG
5 TABLET ORAL ONCE
Refills: 0 | Status: COMPLETED | OUTPATIENT
Start: 2024-01-01 | End: 2024-01-01

## 2024-01-01 RX ADMIN — HYDROMORPHONE HYDROCHLORIDE 0.2 MILLIGRAM(S): 2 INJECTION INTRAMUSCULAR; INTRAVENOUS; SUBCUTANEOUS at 17:48

## 2024-01-01 RX ADMIN — ATORVASTATIN CALCIUM 40 MILLIGRAM(S): 80 TABLET, FILM COATED ORAL at 21:31

## 2024-01-01 RX ADMIN — Medication 10 MILLIGRAM(S): at 00:44

## 2024-01-01 RX ADMIN — Medication 300 MILLIGRAM(S): at 10:01

## 2024-01-01 RX ADMIN — SODIUM CHLORIDE 75 MILLILITER(S): 9 INJECTION INTRAMUSCULAR; INTRAVENOUS; SUBCUTANEOUS at 09:41

## 2024-01-01 RX ADMIN — Medication 10 MILLIGRAM(S): at 13:56

## 2024-01-01 RX ADMIN — Medication 2: at 18:02

## 2024-01-01 RX ADMIN — Medication 0.5 MILLIGRAM(S): at 18:54

## 2024-01-01 RX ADMIN — Medication 1: at 12:25

## 2024-01-01 RX ADMIN — IOHEXOL 30 MILLILITER(S): 300 INJECTION, SOLUTION INTRAVENOUS at 16:30

## 2024-01-01 RX ADMIN — LIDOCAINE 1 PATCH: 4 CREAM TOPICAL at 19:45

## 2024-01-01 RX ADMIN — Medication 650 MILLIGRAM(S): at 14:15

## 2024-01-01 RX ADMIN — Medication 10 MILLIGRAM(S): at 12:51

## 2024-01-01 RX ADMIN — Medication 10 MILLIGRAM(S): at 06:46

## 2024-01-01 RX ADMIN — APIXABAN 5 MILLIGRAM(S): 2.5 TABLET, FILM COATED ORAL at 10:02

## 2024-01-01 RX ADMIN — Medication 60 MILLIGRAM(S): at 12:25

## 2024-01-01 RX ADMIN — ONDANSETRON 4 MILLIGRAM(S): 8 TABLET, FILM COATED ORAL at 00:32

## 2024-01-01 RX ADMIN — OXYCODONE HYDROCHLORIDE 5 MILLIGRAM(S): 5 TABLET ORAL at 12:58

## 2024-01-01 RX ADMIN — Medication 2: at 12:25

## 2024-01-01 RX ADMIN — Medication 60 MILLIGRAM(S): at 00:37

## 2024-01-01 RX ADMIN — PIPERACILLIN AND TAZOBACTAM 25 GRAM(S): 4; .5 INJECTION, POWDER, LYOPHILIZED, FOR SOLUTION INTRAVENOUS at 22:26

## 2024-01-01 RX ADMIN — TRAMADOL HYDROCHLORIDE 25 MILLIGRAM(S): 50 TABLET ORAL at 14:23

## 2024-01-01 RX ADMIN — SODIUM CHLORIDE 75 MILLILITER(S): 9 INJECTION, SOLUTION INTRAVENOUS at 02:52

## 2024-01-01 RX ADMIN — HYDROMORPHONE HYDROCHLORIDE 0.2 MILLIGRAM(S): 2 INJECTION INTRAMUSCULAR; INTRAVENOUS; SUBCUTANEOUS at 02:36

## 2024-01-01 RX ADMIN — OXYCODONE HYDROCHLORIDE 5 MILLIGRAM(S): 5 TABLET ORAL at 12:32

## 2024-01-01 RX ADMIN — Medication 10 MILLIGRAM(S): at 06:17

## 2024-01-01 RX ADMIN — Medication 60 MILLIGRAM(S): at 05:17

## 2024-01-01 RX ADMIN — INSULIN GLARGINE 10 UNIT(S): 100 INJECTION, SOLUTION SUBCUTANEOUS at 23:09

## 2024-01-01 RX ADMIN — APIXABAN 5 MILLIGRAM(S): 2.5 TABLET, FILM COATED ORAL at 21:49

## 2024-01-01 RX ADMIN — Medication 10 MILLIGRAM(S): at 06:15

## 2024-01-01 RX ADMIN — Medication 4: at 07:01

## 2024-01-01 RX ADMIN — Medication 1: at 17:04

## 2024-01-01 RX ADMIN — SODIUM CHLORIDE 75 MILLILITER(S): 9 INJECTION, SOLUTION INTRAVENOUS at 12:50

## 2024-01-01 RX ADMIN — INSULIN GLARGINE 10 UNIT(S): 100 INJECTION, SOLUTION SUBCUTANEOUS at 00:53

## 2024-01-01 RX ADMIN — SODIUM CHLORIDE 75 MILLILITER(S): 9 INJECTION, SOLUTION INTRAVENOUS at 15:27

## 2024-01-01 RX ADMIN — Medication 60 MILLIGRAM(S): at 19:14

## 2024-01-01 RX ADMIN — Medication 10 MILLIGRAM(S): at 05:58

## 2024-01-01 RX ADMIN — INSULIN GLARGINE 10 UNIT(S): 100 INJECTION, SOLUTION SUBCUTANEOUS at 23:05

## 2024-01-01 RX ADMIN — Medication 101 MILLIGRAM(S): at 23:01

## 2024-01-01 RX ADMIN — Medication 3 MILLIGRAM(S): at 21:45

## 2024-01-01 RX ADMIN — I.V. FAT EMULSION 31.3 GM/KG/DAY: 20 EMULSION INTRAVENOUS at 10:57

## 2024-01-01 RX ADMIN — Medication 100 MILLIEQUIVALENT(S): at 11:16

## 2024-01-01 RX ADMIN — LIDOCAINE 1 PATCH: 4 CREAM TOPICAL at 03:57

## 2024-01-01 RX ADMIN — HYDROMORPHONE HYDROCHLORIDE 0.2 MILLIGRAM(S): 2 INJECTION INTRAMUSCULAR; INTRAVENOUS; SUBCUTANEOUS at 06:52

## 2024-01-01 RX ADMIN — Medication 300 MILLIGRAM(S): at 11:20

## 2024-01-01 RX ADMIN — Medication 60 MILLIGRAM(S): at 01:10

## 2024-01-01 RX ADMIN — Medication 10 MILLIGRAM(S): at 22:43

## 2024-01-01 RX ADMIN — HYDROMORPHONE HYDROCHLORIDE 0.2 MILLIGRAM(S): 2 INJECTION INTRAMUSCULAR; INTRAVENOUS; SUBCUTANEOUS at 14:46

## 2024-01-01 RX ADMIN — INSULIN GLARGINE 15 UNIT(S): 100 INJECTION, SOLUTION SUBCUTANEOUS at 22:21

## 2024-01-01 RX ADMIN — PIPERACILLIN AND TAZOBACTAM 25 GRAM(S): 4; .5 INJECTION, POWDER, LYOPHILIZED, FOR SOLUTION INTRAVENOUS at 01:58

## 2024-01-01 RX ADMIN — Medication 650 MILLIGRAM(S): at 05:54

## 2024-01-01 RX ADMIN — INSULIN GLARGINE 15 UNIT(S): 100 INJECTION, SOLUTION SUBCUTANEOUS at 22:47

## 2024-01-01 RX ADMIN — Medication 100 MILLIGRAM(S): at 16:11

## 2024-01-01 RX ADMIN — ENOXAPARIN SODIUM 80 MILLIGRAM(S): 100 INJECTION SUBCUTANEOUS at 22:36

## 2024-01-01 RX ADMIN — Medication 50 MILLIGRAM(S): at 09:13

## 2024-01-01 RX ADMIN — HYDROMORPHONE HYDROCHLORIDE 0.2 MILLIGRAM(S): 2 INJECTION INTRAMUSCULAR; INTRAVENOUS; SUBCUTANEOUS at 23:14

## 2024-01-01 RX ADMIN — ENOXAPARIN SODIUM 80 MILLIGRAM(S): 100 INJECTION SUBCUTANEOUS at 22:25

## 2024-01-01 RX ADMIN — Medication 50 MILLIGRAM(S): at 12:59

## 2024-01-01 RX ADMIN — APIXABAN 5 MILLIGRAM(S): 2.5 TABLET, FILM COATED ORAL at 09:11

## 2024-01-01 RX ADMIN — PIPERACILLIN AND TAZOBACTAM 25 GRAM(S): 4; .5 INJECTION, POWDER, LYOPHILIZED, FOR SOLUTION INTRAVENOUS at 01:09

## 2024-01-01 RX ADMIN — I.V. FAT EMULSION 31.3 GM/KG/DAY: 20 EMULSION INTRAVENOUS at 01:37

## 2024-01-01 RX ADMIN — Medication 3 MILLIGRAM(S): at 21:41

## 2024-01-01 RX ADMIN — LIDOCAINE 1 PATCH: 4 CREAM TOPICAL at 11:13

## 2024-01-01 RX ADMIN — OXYCODONE HYDROCHLORIDE 5 MILLIGRAM(S): 5 TABLET ORAL at 09:19

## 2024-01-01 RX ADMIN — Medication 60 MILLIGRAM(S): at 19:03

## 2024-01-01 RX ADMIN — Medication 50 MILLIGRAM(S): at 09:23

## 2024-01-01 RX ADMIN — Medication 50 MILLIGRAM(S): at 11:14

## 2024-01-01 RX ADMIN — LIDOCAINE 1 PATCH: 4 CREAM TOPICAL at 23:14

## 2024-01-01 RX ADMIN — ATORVASTATIN CALCIUM 40 MILLIGRAM(S): 80 TABLET, FILM COATED ORAL at 21:49

## 2024-01-01 RX ADMIN — PIPERACILLIN AND TAZOBACTAM 25 GRAM(S): 4; .5 INJECTION, POWDER, LYOPHILIZED, FOR SOLUTION INTRAVENOUS at 11:08

## 2024-01-01 RX ADMIN — OXYCODONE HYDROCHLORIDE 5 MILLIGRAM(S): 5 TABLET ORAL at 13:40

## 2024-01-01 RX ADMIN — Medication 300 MILLIGRAM(S): at 09:11

## 2024-01-01 RX ADMIN — OXYCODONE HYDROCHLORIDE 5 MILLIGRAM(S): 5 TABLET ORAL at 10:00

## 2024-01-01 RX ADMIN — SODIUM CHLORIDE 75 MILLILITER(S): 9 INJECTION INTRAMUSCULAR; INTRAVENOUS; SUBCUTANEOUS at 10:05

## 2024-01-01 RX ADMIN — APIXABAN 5 MILLIGRAM(S): 2.5 TABLET, FILM COATED ORAL at 09:13

## 2024-01-01 RX ADMIN — Medication 1: at 12:54

## 2024-01-01 RX ADMIN — ONDANSETRON 4 MILLIGRAM(S): 8 TABLET, FILM COATED ORAL at 04:13

## 2024-01-01 RX ADMIN — Medication 1: at 17:25

## 2024-01-01 RX ADMIN — PIPERACILLIN AND TAZOBACTAM 200 GRAM(S): 4; .5 INJECTION, POWDER, LYOPHILIZED, FOR SOLUTION INTRAVENOUS at 20:36

## 2024-01-01 RX ADMIN — Medication 12.5 GRAM(S): at 09:07

## 2024-01-01 RX ADMIN — Medication 3 MILLIGRAM(S): at 21:31

## 2024-01-01 RX ADMIN — Medication 10 MILLIGRAM(S): at 15:26

## 2024-01-01 RX ADMIN — Medication 400 MILLIGRAM(S): at 22:35

## 2024-01-01 RX ADMIN — Medication 50 MILLIGRAM(S): at 09:21

## 2024-01-01 RX ADMIN — Medication 2: at 09:26

## 2024-01-01 RX ADMIN — Medication 1: at 18:50

## 2024-01-01 RX ADMIN — Medication 650 MILLIGRAM(S): at 22:13

## 2024-01-01 RX ADMIN — Medication 1 EACH: at 01:32

## 2024-01-01 RX ADMIN — Medication 60 MILLIGRAM(S): at 20:26

## 2024-01-01 RX ADMIN — ATORVASTATIN CALCIUM 40 MILLIGRAM(S): 80 TABLET, FILM COATED ORAL at 22:22

## 2024-01-01 RX ADMIN — SODIUM CHLORIDE 75 MILLILITER(S): 9 INJECTION, SOLUTION INTRAVENOUS at 01:44

## 2024-01-01 RX ADMIN — Medication 60 MILLIGRAM(S): at 05:20

## 2024-01-01 RX ADMIN — ATORVASTATIN CALCIUM 40 MILLIGRAM(S): 80 TABLET, FILM COATED ORAL at 20:26

## 2024-01-01 RX ADMIN — Medication 1 EACH: at 01:37

## 2024-01-01 RX ADMIN — Medication 1: at 13:09

## 2024-01-01 RX ADMIN — Medication 10 MILLIGRAM(S): at 14:46

## 2024-01-01 RX ADMIN — Medication 1: at 12:51

## 2024-01-01 RX ADMIN — LIDOCAINE 1 PATCH: 4 CREAM TOPICAL at 16:34

## 2024-01-01 RX ADMIN — Medication 1: at 08:19

## 2024-01-01 RX ADMIN — Medication 3 MILLIGRAM(S): at 22:22

## 2024-01-01 RX ADMIN — Medication 3: at 08:41

## 2024-01-01 RX ADMIN — Medication 5 MILLIGRAM(S): at 21:56

## 2024-01-01 RX ADMIN — Medication 10 MILLIGRAM(S): at 23:06

## 2024-01-01 RX ADMIN — INSULIN GLARGINE 15 UNIT(S): 100 INJECTION, SOLUTION SUBCUTANEOUS at 21:30

## 2024-01-01 RX ADMIN — APIXABAN 5 MILLIGRAM(S): 2.5 TABLET, FILM COATED ORAL at 23:06

## 2024-01-01 RX ADMIN — Medication 10 MILLIGRAM(S): at 22:33

## 2024-01-01 RX ADMIN — APIXABAN 5 MILLIGRAM(S): 2.5 TABLET, FILM COATED ORAL at 13:00

## 2024-01-01 RX ADMIN — Medication 10 MILLIGRAM(S): at 06:52

## 2024-01-01 RX ADMIN — INSULIN GLARGINE 10 UNIT(S): 100 INJECTION, SOLUTION SUBCUTANEOUS at 22:42

## 2024-01-01 RX ADMIN — Medication 50 MILLIGRAM(S): at 09:11

## 2024-01-01 RX ADMIN — Medication 300 MILLIGRAM(S): at 09:14

## 2024-01-01 RX ADMIN — ONDANSETRON 4 MILLIGRAM(S): 8 TABLET, FILM COATED ORAL at 17:52

## 2024-01-01 RX ADMIN — Medication 650 MILLIGRAM(S): at 21:43

## 2024-01-01 RX ADMIN — LIDOCAINE 1 PATCH: 4 CREAM TOPICAL at 09:11

## 2024-01-01 RX ADMIN — PIPERACILLIN AND TAZOBACTAM 25 GRAM(S): 4; .5 INJECTION, POWDER, LYOPHILIZED, FOR SOLUTION INTRAVENOUS at 17:50

## 2024-01-01 RX ADMIN — Medication 2: at 14:53

## 2024-01-01 RX ADMIN — TRAMADOL HYDROCHLORIDE 25 MILLIGRAM(S): 50 TABLET ORAL at 15:00

## 2024-01-01 RX ADMIN — Medication 1: at 08:42

## 2024-01-01 RX ADMIN — LIDOCAINE 1 PATCH: 4 CREAM TOPICAL at 11:08

## 2024-01-01 RX ADMIN — INSULIN GLARGINE 15 UNIT(S): 100 INJECTION, SOLUTION SUBCUTANEOUS at 21:46

## 2024-01-01 RX ADMIN — APIXABAN 5 MILLIGRAM(S): 2.5 TABLET, FILM COATED ORAL at 22:22

## 2024-01-01 RX ADMIN — OXYCODONE HYDROCHLORIDE 5 MILLIGRAM(S): 5 TABLET ORAL at 14:10

## 2024-01-01 RX ADMIN — Medication 50 MILLIGRAM(S): at 10:15

## 2024-01-01 RX ADMIN — INSULIN GLARGINE 10 UNIT(S): 100 INJECTION, SOLUTION SUBCUTANEOUS at 22:25

## 2024-01-01 RX ADMIN — HYDROMORPHONE HYDROCHLORIDE 0.2 MILLIGRAM(S): 2 INJECTION INTRAMUSCULAR; INTRAVENOUS; SUBCUTANEOUS at 22:25

## 2024-01-01 RX ADMIN — Medication 180 MILLIGRAM(S): at 09:21

## 2024-01-01 RX ADMIN — ONDANSETRON 4 MILLIGRAM(S): 8 TABLET, FILM COATED ORAL at 21:49

## 2024-01-01 RX ADMIN — Medication 3 MILLIGRAM(S): at 23:06

## 2024-01-01 RX ADMIN — Medication 10 MILLIGRAM(S): at 14:23

## 2024-01-01 RX ADMIN — I.V. FAT EMULSION 47.1 GM/KG/DAY: 20 EMULSION INTRAVENOUS at 01:31

## 2024-01-01 RX ADMIN — INSULIN GLARGINE 15 UNIT(S): 100 INJECTION, SOLUTION SUBCUTANEOUS at 21:48

## 2024-01-01 RX ADMIN — Medication 2: at 15:25

## 2024-01-01 RX ADMIN — Medication 5 MILLIGRAM(S): at 22:34

## 2024-01-01 RX ADMIN — Medication 650 MILLIGRAM(S): at 13:10

## 2024-01-01 RX ADMIN — Medication 50 MILLIGRAM(S): at 10:52

## 2024-01-01 RX ADMIN — Medication 50 MILLIGRAM(S): at 10:02

## 2024-01-01 RX ADMIN — Medication 10 MILLIGRAM(S): at 23:08

## 2024-01-01 RX ADMIN — Medication 650 MILLIGRAM(S): at 22:15

## 2024-01-01 RX ADMIN — Medication 3 MILLIGRAM(S): at 21:49

## 2024-01-01 RX ADMIN — Medication 4: at 22:34

## 2024-01-01 RX ADMIN — Medication 10 MILLIGRAM(S): at 06:44

## 2024-01-01 RX ADMIN — PIPERACILLIN AND TAZOBACTAM 25 GRAM(S): 4; .5 INJECTION, POWDER, LYOPHILIZED, FOR SOLUTION INTRAVENOUS at 06:52

## 2024-01-01 RX ADMIN — Medication 10 MILLIGRAM(S): at 22:26

## 2024-01-01 RX ADMIN — APIXABAN 5 MILLIGRAM(S): 2.5 TABLET, FILM COATED ORAL at 21:31

## 2024-01-01 RX ADMIN — Medication 60 MILLIGRAM(S): at 11:15

## 2024-01-01 RX ADMIN — Medication 1: at 19:03

## 2024-01-01 RX ADMIN — ATORVASTATIN CALCIUM 40 MILLIGRAM(S): 80 TABLET, FILM COATED ORAL at 23:06

## 2024-01-01 RX ADMIN — Medication 650 MILLIGRAM(S): at 09:09

## 2024-01-01 RX ADMIN — ONDANSETRON 4 MILLIGRAM(S): 8 TABLET, FILM COATED ORAL at 10:05

## 2024-01-01 RX ADMIN — Medication 400 MILLIGRAM(S): at 06:31

## 2024-01-01 RX ADMIN — Medication 650 MILLIGRAM(S): at 21:45

## 2024-01-01 RX ADMIN — Medication 40 MILLIEQUIVALENT(S): at 11:14

## 2024-01-01 RX ADMIN — INSULIN GLARGINE 15 UNIT(S): 100 INJECTION, SOLUTION SUBCUTANEOUS at 20:26

## 2024-01-01 RX ADMIN — PIPERACILLIN AND TAZOBACTAM 25 GRAM(S): 4; .5 INJECTION, POWDER, LYOPHILIZED, FOR SOLUTION INTRAVENOUS at 14:57

## 2024-01-01 RX ADMIN — ATORVASTATIN CALCIUM 40 MILLIGRAM(S): 80 TABLET, FILM COATED ORAL at 21:41

## 2024-01-01 RX ADMIN — PIPERACILLIN AND TAZOBACTAM 25 GRAM(S): 4; .5 INJECTION, POWDER, LYOPHILIZED, FOR SOLUTION INTRAVENOUS at 06:45

## 2024-03-11 NOTE — H&P ADULT - ASSESSMENT
89 y/o F w/ PMH of HTN, dyslipidemia, PPM, paroxysmal a-fib (on coumadin), TIAs, DM2, p/w aphasia    *Aphasia w/o ho TIA  -Patient evaluated by Neuro, recommends MRI / MRA. However, patient believes that PPM is not compatible with MRI.   -CTH:   -Neuro checks  -Neuro consult  -ASA allergy (patient states she develops hives w/ ASA use)   -Statin  -Lipid panel / A1c  -Tele monitoring  -Echo  -PT consult  -Speech and swallow consult  -Fall precautions     *Elevated troponin   -Trend troponins  -ASA / Statin  -Cardio consult  -Tele monitoring  -Echo  -EKG Reviewed   -EP consult to interrogate PPM     *Paroxysmal A-fib  -INR therapeutic on coumadin     *DM2  -Humalog ISS + Basal insulin   -Diabetic diet     *H/o dyslipidemia  -C/w home meds     *Mild thrombocytopenia   -F/u outpatient for further management if remains stable     *DVT ppx   -Therapeutic INR on coumadin     >75 mins required for admission      89 y/o F w/ PMH of HTN, dyslipidemia, PPM, paroxysmal a-fib (on coumadin), TIAs, DM2, p/w aphasia    *Aphasia w/o ho TIA  -Patient evaluated by Neuro, recommends MRI / MRA. However, patient believes that PPM is not compatible with MRI. F/u EP   -CTH: No acute findings   -Neuro checks  -Neuro consult  -ASA allergy (patient states she develops hives w/ ASA use)   -Statin  -Lipid panel / A1c  -Tele monitoring  -Echo  -PT consult  -Speech and swallow consult  -Fall precautions     *Elevated troponin   -Trend troponins  -Therapeutic INR on coumadin   -ASA allergy   -Cardio consult  -Tele monitoring  -Echo  -EKG Reviewed   -EP consult to interrogate PPM     *Paroxysmal A-fib  -INR therapeutic on coumadin     *DM2  -Humalog ISS + Basal insulin   -Diabetic diet     *H/o dyslipidemia  -C/w home meds     *Mild thrombocytopenia   -F/u outpatient for further management if remains stable     *DVT ppx   -Therapeutic INR on coumadin     >75 mins required for admission    89 y/o F w/ PMH of HTN, dyslipidemia, PPM, paroxysmal a-fib (on coumadin), TIAs, DM2, p/w aphasia    *Aphasia w/o ho TIA  -Patient evaluated by Neuro, recommends MRI / MRA. However, patient believes that PPM is not compatible with MRI. F/u EP   -CTH: No acute findings   -Neuro checks  -Neuro consult  -ASA allergy (patient states she develops hives w/ ASA use)   -Statin  -Lipid panel / A1c  -Tele monitoring  -Echo  -PT consult  -Speech and swallow consult  -Fall precautions     *Elevated troponin   -Trend troponins  -Therapeutic INR on coumadin   -ASA allergy   -Cardio consult  -Tele monitoring  -Echo  -EKG Reviewed   -EP consult to interrogate PPM     *Paroxysmal A-fib  -INR therapeutic on coumadin     *DM2  -Humalog ISS + Basal insulin   -Diabetic diet     *H/o dyslipidemia  -C/w home meds     *Mild thrombocytopenia   -F/u outpatient for further management if remains stable     *DVT ppx   -Therapeutic INR on coumadin     >75 mins required for admission

## 2024-03-11 NOTE — ED PROVIDER NOTE - OBJECTIVE STATEMENT
89 y/o female with PMHx of HTN, paroxysmal A-fib, carotid atherosclerosis B/L, HLD, DM2, SSS, spinal stenosis, TIA; presents to c/o aphasia sudden onset 18:00, self-resolved. No cp, sob, or weakness x4 extremities. +AC Warfarin.

## 2024-03-11 NOTE — H&P ADULT - HISTORY OF PRESENT ILLNESS
89 y/o F w/ PMH of HTN, dyslipidemia, PPM, paroxysmal a-fib (on coumadin), TIAs, DM2, p/w aphasia. Patient states she finished her dinner and was feeling like her normal self, and then around 6pm her speech became abnormal when speaking to her . States her speech was coming out garbled, and this lasted for 10 minutes before resolving spontaneously. States she was able to walk herself to the bathroom and back after the episode. Denies any facial droop, weakness in arms / legs, sensory deficits, dysphadia, CP, SOB, nausea, vomiting, abdominal pain       PSH: TAVR, PPM placement, hysterectomy, appendectomy     Social Hx: Denies tobacco / etoh / drugs     Family Hx: No pertinent hx

## 2024-03-11 NOTE — CHART NOTE - NSCHARTNOTEFT_GEN_A_CORE
Discussion with: Dr. Christal Hart at 7:55 pm on 3/11/2024 via audio only consult    HISTORY OF PRESENT ILLNESS:  Patient is a 89 y/o F with a PMHx of HTN, HLD, paroxysmal A.fib on Coumadin, TIAs, who p/w an acute onset of aphasia. At 6 pm tonight patient had a sudden onset of aphasia which resolved on its own. As per Dr. Hart, patient has a current NIHSS of 0. Initial BP of 202/51. Unable to obtain CTA/CTP due to contrast allergy.     PMH/PSH is PAST MEDICAL & SURGICAL HISTORY:  HTN (hypertension)      Paroxysmal atrial fibrillation      Type 2 diabetes mellitus with complication, with long-term current use of insulin      Carotid atherosclerosis  bilateral      AV block, 2nd degree      Carpal tunnel syndrome      HLD (hyperlipidemia)      Sensory neuropathy      Sick sinus syndrome      Vertigo      Arthritis      Spinal stenosis      GIACOMO (obstructive sleep apnea)      H/O: hysterectomy  due to fibroids      History of appendectomy      Artificial cardiac pacemaker  6/ 2009      History of transcatheter aortic valve replacement (TAVR)  4/2019      History of cholecystectomy    CT HEAD IMAGING RESULTS:  CT Head:  Mild volume loss, microvascular disease, no acute hemorrhage or   midline shift.    Labs:  CAPILLARY BLOOD GLUCOSE      POCT Blood Glucose.: 166 mg/dL (11 Mar 2024 19:25)    Platelet Count - Automated: 140 K/uL (03-11-24 @ 20:11)      NEUROLOGIC EXAMINATION deferred – interprofessional audio discussion only     Inclusion Criteria:  Clearly defined time onset within 4.5 hours of treatment Yes [x] No []    Ischemic stroke with a measurable deficit on NIHSS or a non-measurable deficit that is deemed disabling?  Yes [] no [x]  or  Unclear time of onset wake-up with stroke symptoms yes [] no[]  If yes:  [] Treatment can be initiated within 4.5 hrs. from symptom recognition  [] MRI can be obtained acutely from the emergency department  [] Not an endovascular stroke therapy candidate  [] Baseline functional independence.  Pre-stroke mRS of 0-1  [] NIHSS less than 26  [] DW-MRI with diffusion-positive. FLAIR – negative lesions indicating timing of stroke onset less than 4.5 hrs.  [] MRI mismatch between abnormal signal on DW-MRI and no visible signal change on FLAIR        Review thrombolytic CONTRAINDICATIONS  [] None    ABSOLUTE EXCLUSION CRITERIA:  [] Patient outside of the appropriate time window for IV thrombolysis  [] Evidence of intracranial hemorrhage on pretreatment CT scan (CT must be read by an attending radiologist or attending neurologist)  [] Head CT findings suggesting an established acute cerebral infarction as evidenced by jalil hypodensity, regardless of size.  [] Clinical presentation consistent with subarachnoid hemorrhage, even with normal CT scan  [] Active internal bleeding  [x] Known bleeding diathesis (including but not limited to platelet count < 100,000, use of oral anticoagulants with INR>1.7, use of full dose low molecular       weight heparin within the last 24 hours, use of unfractionated heparin AND a prolonged PTT (> 40sec), use of direct thrombin inhibitor (e.g. dabigatran) or oral direct Factor Xa inhibitor (e.g. rivaroxaban, apixaban) within 48 hours) with any degree of abnormality on any coagulation test; any DOAC taken within 3 hours of presentation, regardless of coagulation test results  [] Systolic pressure >= 185 mmHg or diastolic pressure 110 mmHg on repeated measurements at the time treatment is to begin  [] Care team unable to determine eligibility for IV thrombolysis  [] Patient, family, or surrogate declines and understands risks and benefits of treatment.  [] For wake-up Protocol patients: DW-MRI lesions larger than one-third of the MCA territory    RELATIVE EXCLUSION CRITERIA  [] Isolated neurological deficits (except for aphasia or hemianopsia)  [x] stroke severity too mild (non-disabling)  [] Seizure at onset with post-ictal residual neurological impairment  [] Gastrointestinal, genitourinary or respiratory hemorrhage within 21 days   [] Major surgery within 14 days   [] Blood glucose level < 50 or > 400 mG/dL  [] CPR with chest compressions or minor surgery (including liver and kidney biopsy, thoracocentesis, lumbar puncture) within 10 days   [] Arterial puncture at non-compressible site within 7 days   [] Evidence of acute trauma (fracture)   Significant head trauma or prior stroke in the previous 3 months  History of previous intracranial hemorrhage  Intracranial or intraspinal surgery within past 3 months[] Diabetic hemorrhagic retinopathy or ophthalmic bleeding  [] Pregnancy or peripartum; no nursing post- treatment  [] Post-myocardial infarction pericarditis  [] Peritoneal dialysis or hemodialysis or severe hepatic disease   [] Known bacterial endocarditis   [] Life expectancy less than 6 months or sever co-morbid illness   [] Known cerebral vascular malformation, untreated intracranial aneurysm or brain tumor (may consider IV alteplase in patients with CNS lesions that have a very low likelihood of hemorrhage, such as small un-ruptured aneurysms or benign tumors with low vascularity.  [] Social/Yazidi reason  [] Other ____________________    ASSESSMENT: Patient is a 89 y/o F with a PMHx of HTN, HLD, paroxysmal A.fib on Coumadin, TIAs, who p/w an acute onset of aphasia, LKW at 6 pm. In the ED, patient has a current NIHSS of 0. CTH read no acute infarct. CTA/CTP could not be completed due to patient's contrast allergy. Patient is not a current TNK candidate as she does not current have any focal disabling deficit or deficits on exam. In addition, she has an elevated INR of 1.98 and elevated PT of 21.9. Patient is not a current neuro IR thrombectomy candidate as she has a current NIHSS of 0.     RECOMMENDATIONS:  [] consider TIA/stroke workup   [] obtain MRI brain w/o contrast, MRA head w/o contrast, and MRA neck with/without contrast tomorrow, restart Coumadin once stroke burden has been determined with MRI brain. Discussion with: Dr. Christal Hart at 7:55 pm on 3/11/2024 via audio only consult    HISTORY OF PRESENT ILLNESS:  Patient is a 89 y/o F with a PMHx of HTN, HLD, paroxysmal A.fib on Coumadin, TIAs, who p/w an acute onset of aphasia. At 6 pm tonight patient had a sudden onset of aphasia which resolved on its own. As per Dr. Hart, patient has a current NIHSS of 0. Initial BP of 202/51. Unable to obtain CTA/CTP due to contrast allergy.     PMH/PSH is PAST MEDICAL & SURGICAL HISTORY:  HTN (hypertension)      Paroxysmal atrial fibrillation      Type 2 diabetes mellitus with complication, with long-term current use of insulin      Carotid atherosclerosis  bilateral      AV block, 2nd degree      Carpal tunnel syndrome      HLD (hyperlipidemia)      Sensory neuropathy      Sick sinus syndrome      Vertigo      Arthritis      Spinal stenosis      GIACOMO (obstructive sleep apnea)      H/O: hysterectomy  due to fibroids      History of appendectomy      Artificial cardiac pacemaker  6/ 2009      History of transcatheter aortic valve replacement (TAVR)  4/2019      History of cholecystectomy    CT HEAD IMAGING RESULTS:  CT Head:  Mild volume loss, microvascular disease, no acute hemorrhage or   midline shift.    Labs:  CAPILLARY BLOOD GLUCOSE      POCT Blood Glucose.: 166 mg/dL (11 Mar 2024 19:25)    Platelet Count - Automated: 140 K/uL (03-11-24 @ 20:11)      NEUROLOGIC EXAMINATION deferred – interprofessional audio discussion only     Inclusion Criteria:  Clearly defined time onset within 4.5 hours of treatment Yes [x] No []    Ischemic stroke with a measurable deficit on NIHSS or a non-measurable deficit that is deemed disabling?  Yes [] no [x]  or  Unclear time of onset wake-up with stroke symptoms yes [] no[]  If yes:  [] Treatment can be initiated within 4.5 hrs. from symptom recognition  [] MRI can be obtained acutely from the emergency department  [] Not an endovascular stroke therapy candidate  [] Baseline functional independence.  Pre-stroke mRS of 0-1  [] NIHSS less than 26  [] DW-MRI with diffusion-positive. FLAIR – negative lesions indicating timing of stroke onset less than 4.5 hrs.  [] MRI mismatch between abnormal signal on DW-MRI and no visible signal change on FLAIR        Review thrombolytic CONTRAINDICATIONS  [] None    ABSOLUTE EXCLUSION CRITERIA:  [] Patient outside of the appropriate time window for IV thrombolysis  [] Evidence of intracranial hemorrhage on pretreatment CT scan (CT must be read by an attending radiologist or attending neurologist)  [] Head CT findings suggesting an established acute cerebral infarction as evidenced by jalil hypodensity, regardless of size.  [] Clinical presentation consistent with subarachnoid hemorrhage, even with normal CT scan  [] Active internal bleeding  [x] Known bleeding diathesis (including but not limited to platelet count < 100,000, use of oral anticoagulants with INR>1.7, use of full dose low molecular       weight heparin within the last 24 hours, use of unfractionated heparin AND a prolonged PTT (> 40sec), use of direct thrombin inhibitor (e.g. dabigatran) or oral direct Factor Xa inhibitor (e.g. rivaroxaban, apixaban) within 48 hours) with any degree of abnormality on any coagulation test; any DOAC taken within 3 hours of presentation, regardless of coagulation test results  [] Systolic pressure >= 185 mmHg or diastolic pressure 110 mmHg on repeated measurements at the time treatment is to begin  [] Care team unable to determine eligibility for IV thrombolysis  [] Patient, family, or surrogate declines and understands risks and benefits of treatment.  [] For wake-up Protocol patients: DW-MRI lesions larger than one-third of the MCA territory    RELATIVE EXCLUSION CRITERIA  [] Isolated neurological deficits (except for aphasia or hemianopsia)  [x] stroke severity too mild (non-disabling)  [] Seizure at onset with post-ictal residual neurological impairment  [] Gastrointestinal, genitourinary or respiratory hemorrhage within 21 days   [] Major surgery within 14 days   [] Blood glucose level < 50 or > 400 mG/dL  [] CPR with chest compressions or minor surgery (including liver and kidney biopsy, thoracocentesis, lumbar puncture) within 10 days   [] Arterial puncture at non-compressible site within 7 days   [] Evidence of acute trauma (fracture)   Significant head trauma or prior stroke in the previous 3 months  History of previous intracranial hemorrhage  Intracranial or intraspinal surgery within past 3 months[] Diabetic hemorrhagic retinopathy or ophthalmic bleeding  [] Pregnancy or peripartum; no nursing post- treatment  [] Post-myocardial infarction pericarditis  [] Peritoneal dialysis or hemodialysis or severe hepatic disease   [] Known bacterial endocarditis   [] Life expectancy less than 6 months or sever co-morbid illness   [] Known cerebral vascular malformation, untreated intracranial aneurysm or brain tumor (may consider IV alteplase in patients with CNS lesions that have a very low likelihood of hemorrhage, such as small un-ruptured aneurysms or benign tumors with low vascularity.  [] Social/B[employer]ist reason  [] Other ____________________    ASSESSMENT: Patient is a 89 y/o F with a PMHx of HTN, HLD, paroxysmal A.fib on Coumadin, TIAs, who p/w an acute onset of aphasia, LKW at 6 pm. In the ED, patient has a current NIHSS of 0. CTH read no acute infarct. CTA/CTP could not be completed due to patient's contrast allergy. Patient is not a current TNK candidate as she does not current have any focal disabling deficit or deficits on exam. In addition, she has an elevated INR of 1.98 and elevated PT of 21.9. Patient is not a current neuro IR thrombectomy candidate as she has a current NIHSS of 0.     RECOMMENDATIONS:  [] TIA/stroke workup   [] If PPM is compatible, then would obtain MRI brain w/o contrast, MRA head/neck with/without contrast; if not compatible, then obtain carotid US doppler  c/w AC Discussion with: Dr. Christal Hart at 7:55 pm on 3/11/2024 via audio only consult    HISTORY OF PRESENT ILLNESS:  Patient is a 89 y/o F with a PMHx of HTN, HLD, paroxysmal A.fib on Coumadin, TIAs, who p/w an acute onset of aphasia. At 6 pm tonight patient had a sudden onset of aphasia which resolved on its own. As per Dr. Hart, patient has a current NIHSS of 0. Initial BP of 202/51. Unable to obtain CTA/CTP due to contrast allergy.     PMH/PSH is PAST MEDICAL & SURGICAL HISTORY:  HTN (hypertension)      Paroxysmal atrial fibrillation      Type 2 diabetes mellitus with complication, with long-term current use of insulin      Carotid atherosclerosis  bilateral      AV block, 2nd degree      Carpal tunnel syndrome      HLD (hyperlipidemia)      Sensory neuropathy      Sick sinus syndrome      Vertigo      Arthritis      Spinal stenosis      GIACOMO (obstructive sleep apnea)      H/O: hysterectomy  due to fibroids      History of appendectomy      Artificial cardiac pacemaker  6/ 2009      History of transcatheter aortic valve replacement (TAVR)  4/2019      History of cholecystectomy    CT HEAD IMAGING RESULTS:  CT Head:  Mild volume loss, microvascular disease, no acute hemorrhage or   midline shift.    Labs:  CAPILLARY BLOOD GLUCOSE      POCT Blood Glucose.: 166 mg/dL (11 Mar 2024 19:25)    Platelet Count - Automated: 140 K/uL (03-11-24 @ 20:11)      NEUROLOGIC EXAMINATION deferred – interprofessional audio discussion only     Inclusion Criteria:  Clearly defined time onset within 4.5 hours of treatment Yes [x] No []    Ischemic stroke with a measurable deficit on NIHSS or a non-measurable deficit that is deemed disabling?  Yes [] no [x]  or  Unclear time of onset wake-up with stroke symptoms yes [] no[]  If yes:  [] Treatment can be initiated within 4.5 hrs. from symptom recognition  [] MRI can be obtained acutely from the emergency department  [] Not an endovascular stroke therapy candidate  [] Baseline functional independence.  Pre-stroke mRS of 0-1  [] NIHSS less than 26  [] DW-MRI with diffusion-positive. FLAIR – negative lesions indicating timing of stroke onset less than 4.5 hrs.  [] MRI mismatch between abnormal signal on DW-MRI and no visible signal change on FLAIR        Review thrombolytic CONTRAINDICATIONS  [] None    ABSOLUTE EXCLUSION CRITERIA:  [] Patient outside of the appropriate time window for IV thrombolysis  [] Evidence of intracranial hemorrhage on pretreatment CT scan (CT must be read by an attending radiologist or attending neurologist)  [] Head CT findings suggesting an established acute cerebral infarction as evidenced by jalil hypodensity, regardless of size.  [] Clinical presentation consistent with subarachnoid hemorrhage, even with normal CT scan  [] Active internal bleeding  [x] Known bleeding diathesis (including but not limited to platelet count < 100,000, use of oral anticoagulants with INR>1.7, use of full dose low molecular       weight heparin within the last 24 hours, use of unfractionated heparin AND a prolonged PTT (> 40sec), use of direct thrombin inhibitor (e.g. dabigatran) or oral direct Factor Xa inhibitor (e.g. rivaroxaban, apixaban) within 48 hours) with any degree of abnormality on any coagulation test; any DOAC taken within 3 hours of presentation, regardless of coagulation test results  [] Systolic pressure >= 185 mmHg or diastolic pressure 110 mmHg on repeated measurements at the time treatment is to begin  [] Care team unable to determine eligibility for IV thrombolysis  [] Patient, family, or surrogate declines and understands risks and benefits of treatment.  [] For wake-up Protocol patients: DW-MRI lesions larger than one-third of the MCA territory    RELATIVE EXCLUSION CRITERIA  [] Isolated neurological deficits (except for aphasia or hemianopsia)  [x] stroke severity too mild (non-disabling)  [] Seizure at onset with post-ictal residual neurological impairment  [] Gastrointestinal, genitourinary or respiratory hemorrhage within 21 days   [] Major surgery within 14 days   [] Blood glucose level < 50 or > 400 mG/dL  [] CPR with chest compressions or minor surgery (including liver and kidney biopsy, thoracocentesis, lumbar puncture) within 10 days   [] Arterial puncture at non-compressible site within 7 days   [] Evidence of acute trauma (fracture)   Significant head trauma or prior stroke in the previous 3 months  History of previous intracranial hemorrhage  Intracranial or intraspinal surgery within past 3 months[] Diabetic hemorrhagic retinopathy or ophthalmic bleeding  [] Pregnancy or peripartum; no nursing post- treatment  [] Post-myocardial infarction pericarditis  [] Peritoneal dialysis or hemodialysis or severe hepatic disease   [] Known bacterial endocarditis   [] Life expectancy less than 6 months or sever co-morbid illness   [] Known cerebral vascular malformation, untreated intracranial aneurysm or brain tumor (may consider IV alteplase in patients with CNS lesions that have a very low likelihood of hemorrhage, such as small un-ruptured aneurysms or benign tumors with low vascularity.  [] Social/Restorationism reason  [] Other ____________________    ASSESSMENT: Patient is a 89 y/o F with a PMHx of HTN, HLD, paroxysmal A.fib on Coumadin, TIAs, who p/w an acute onset of aphasia, LKW at 6 pm. In the ED, patient has a current NIHSS of 0. CTH read no acute infarct. CTA/CTP could not be completed due to patient's contrast allergy. Patient is not a current TNK candidate as she does not current have any focal disabling deficit or deficits on exam. In addition, she has an elevated INR of 1.98 and elevated PT of 21.9. Patient is not a current neuro IR thrombectomy candidate as she has a current NIHSS of 0.     RECOMMENDATIONS:  [] TIA/stroke workup; inpatient neurology consultation  [] If PPM is compatible, then would obtain MRI brain w/o contrast, MRA head/neck with/without contrast; if not compatible, then obtain carotid US doppler  c/w AC  trend troponins, echo, cardiology followup, telemetry  medical care per primary team

## 2024-03-11 NOTE — ED PROVIDER NOTE - CLINICAL SUMMARY MEDICAL DECISION MAKING FREE TEXT BOX
89 y/o female presents to ED c/o episode of aphasia now resolved. EKG, CT imaging, labs, admit. 89 y/o female presents to ED c/o episode of aphasia now resolved. EKG, CT imaging, labs, admit.    Tanner DO: spoke with tele stroke; rec admission for tia/stroke work up; pending labs at this time.

## 2024-03-11 NOTE — ED ADULT TRIAGE NOTE - CHIEF COMPLAINT QUOTE
89 y/o BIBEMS for stroke-like symptoms. Pt states LKW at 6 PM. Pt reported not being able to complete sentences and changes in vision. Pt reports that symptoms improved after 1 hour. MD Hart at bedside evaluating pt. Code stroke called at 19:19. -BEFAST in triage. BGM WNL. PMHx TIA. Pt on wafarin. Allergy to aspirin.

## 2024-03-11 NOTE — ED ADULT NURSE NOTE - OBJECTIVE STATEMENT
pt is A/Ox4 from home and is ambulatory with a walker. pt to the ED with c/o of speech and vision changes starting 6PM tonight that has since resolved. pt with Equal  strength bilaterally, +ROM in all extremities. No noted facial droop, slurred speech, or bilateral arm/leg drift. pt with positive Sensation and strength intact. pt denies CP or SOB. pt with PMH of past TIA and hypertension. pt respirations even and unlabored. pt in NAD. Per MD orders pt IV placed, labs sent, Cardiac monitor placed, pt medicated and EKG completed.

## 2024-03-11 NOTE — ED PROVIDER NOTE - NEUROLOGICAL, MLM
Alert and oriented, no focal deficits, no motor or sensory deficits. Alert and oriented, no focal deficits, no motor or sensory deficits. NIH SS = 0

## 2024-03-12 NOTE — SWALLOW BEDSIDE ASSESSMENT ADULT - COMMENTS
The patient was admitted to  with acute onset of verbal expression difficulties that precipitated this hospitalization but now reportedly resolved. Pt was previously hospitalized at this facility in 5/19 with transient RUE/RLE weakness that resolved, suspect for a TIA. Other prior  underlying medical history is remarkable for vertigo, GIACOMO, spinal stenosis, DM type 2 with neuropathy, bilateral Carotid stenosis, HTN, HLD, A-Fib, AV heart block(2nd degree), SSS s/p PPM, valvular heart disease s/p TAVR, prior hysterectomy and appy.

## 2024-03-12 NOTE — SWALLOW BEDSIDE ASSESSMENT ADULT - SWALLOW EVAL: DIAGNOSIS
1) The pt exhibits functional Oropharyngeal Swallowing abilities for age. No behavioral aspiration signs exhibited. Bolus formation/transfer were mechanically functional for age, pt cleared oral debris after age acceptable piecemeal deglutition, swallow triggered in acceptable time frame for age, & laryngeal lift on palpation was felt to be functional for age as well. No behavioral aspiration signs exhibited, as previously stated.  No change in O2 sats noted. Odynophagia was denied.   2) On encounter, a mild right lip lag was apparent that did not hinder speech integrity. The pt is alert, interactive & pleasant. Pt is oriented x3+ and able to verbalize during communicative probes as well as in conversation. At these times, her motor speech abilities were functional, her speech intelligibility was good, her verbalizations were linguistically intact/contextually appropriate & she feels that she is at communicative baseline. The pt was able to effectively verbalize her intents.

## 2024-03-12 NOTE — SWALLOW BEDSIDE ASSESSMENT ADULT - ASR SWALLOW LABIAL MOBILITY
A very mild right lip lag was apparent but lip strength was functional for speech/deglutition purposes.

## 2024-03-12 NOTE — PHYSICAL THERAPY INITIAL EVALUATION ADULT - PERTINENT HX OF CURRENT PROBLEM, REHAB EVAL
89 y/o F w/ PMH of HTN, dyslipidemia, PPM, paroxysmal a-fib (on coumadin), TIAs, DM2, p/w aphasia. Patient states she finished her dinner and was feeling like her normal self, and then around 6pm her speech became abnormal when speaking to her . States her speech was coming out garbled, and this lasted for 10 minutes before resolving spontaneously. States she was able to walk herself to the bathroom and back after the episode. Denies any facial droop, weakness in arms / legs, sensory deficits, dysphadia, CP, SOB, nausea, vomiting, abdominal pain

## 2024-03-12 NOTE — PROGRESS NOTE ADULT - SUBJECTIVE AND OBJECTIVE BOX
CC: called for hypertension when arriving on floor  new one time order for labetalol, 3/12 3:30 pm  new order to change Cartia CD to short act tid as re calibrate according to symptoms  on   new order for am labs    ROS:  + palpitations,   no chest pain, dyspnea or headache  Rest of 14 ros are negative    telemetry reviewed, range v-paced, pvc, pacs, ~66 BPM  VSS, afebrile, oxygenating well    PHYSICAL EXAM:  Constitutional: NAD, well-groomed, well-developed, wig with glasses  HEENT: PERRLA, EOMI, Normal Hearing,   Neck: No LAD, No JVD  Back: Normal spine flexure, No CVA tenderness  Respiratory: CTAB  Cardiovascular: limited, S1 and S2, RRR, no M/G/R  Gastrointestinal: BS+, soft, NT/ND  Extremities: No peripheral edema  Vascular: 2+ peripheral pulses  Neurological: A/O x 3, no focal deficits  Psychiatric: Normal mood, normal affect  Musculoskeletal: 5/5 strength b/l upper and lower extremities  Skin: No rashes    Labs & rads reviewed and compared, of note...  abnormal coagulation profile  POCT 171  Tropin 243 from 248    CT lumbar spine  Mild compression fracture along the superior endplate of L4 of indeterminate age, likely acute on chronic. There is approximately 30% vertebral body height loss. No bony retropulsion.    Multilevel posterior disc osteophyte complexes and severe bilateral facet facet arthropathy are present at L3/L4 through L5/S1 resulting in severe right and mild left foraminal stenosis at L3/L4, and mild bilateral foraminal stenosis at L4/L5 and L5/S1. There is moderate to severe central spinal canal stenosis at L3/L4 and L4/L5.

## 2024-03-12 NOTE — SWALLOW BEDSIDE ASSESSMENT ADULT - SLP GENERAL OBSERVATIONS
On encounter, a mild right lip lag was apparent that did not hinder speech integrity. The pt is alert, interactive & pleasant. Pt is oriented x3+ and able to verbalize during communicative probes as well as in conversation. At these times, her motor speech abilities were functional, her speech intelligibility was good, her verbalizations were linguistically intact/contextually appropriate & she feels that she is at communicative baseline. The pt was able to effectively verbalize her intents.

## 2024-03-12 NOTE — CONSULT NOTE ADULT - NS ATTEND AMEND GEN_ALL_CORE FT
89 yr old woman hx of a-fib on coumadin, ADM with transient difficulty speaking. Non focal exam, head Ct shows no acute findings. ? TIA. If PPM compatible do MR head/MRA head and neck. Agree with above.

## 2024-03-12 NOTE — CONSULT NOTE ADULT - ASSESSMENT
89 y/o F w/ PMH of HTN, dyslipidemia, PPM, paroxysmal a-fib (on coumadin-last INR here 1.98), TIA 2020 presenting with expressive aphasia, DM2, p/w aphasia.   Patient states she finished her dinner and was feeling like her normal self, and then around 6pm yesterday she was not able to get words out when speaking to her . This lasted 10 minutes and then self resolved.  There was not facial droop, focal weakness/numbness, diplopia, unsteady gait, dizziness.  States she was able to walk herself to the bathroom and back after the episode.  In the ED code stroke was called.  NIHSS 0, initial /51.  CTH was neg for acute bleed, ischemia.  CTA/P not done due to contrast allergy. She was not a thrombolytics candidate because she did not have any focal disabling deficits, and her INR was 1.98 and elevated PT of 21.9.  She was not a thrombectomy candidate as her NIHSS was 0.        #TIA likely    Recommendations  -EP to interrogate and see if PPM can have an MRI  -If patient cannot have MRI can get carotid US  -2D Echo  -monitor on tele  -Pt. has an ASA allergy so will need to consider adding plavix or changing coumadin to Eliquis  -F/U cardiology cs  -Dysphagia screen  -LDL is 56 so will continue Atorvastatin 40mg QD.  A1c 7.5  -PT eval  -Neurochecks/VS q 4, FS q 6 hours x 24 hours    D/W Dr. Nesbitt    D/W Dr. Nesbitt 89 y/o F w/ PMH of HTN, dyslipidemia, PPM, paroxysmal a-fib (on coumadin-last INR here 1.98), TIA 2020 presenting with expressive aphasia, DM2, p/w aphasia.   Patient states she finished her dinner and was feeling like her normal self, and then around 6pm yesterday she was not able to get words out when speaking to her . This lasted 10 minutes and then self resolved.  There was not facial droop, focal weakness/numbness, diplopia, unsteady gait, dizziness.  States she was able to walk herself to the bathroom and back after the episode.  In the ED code stroke was called.  NIHSS 0, initial /51.  CTH was neg for acute bleed, ischemia.  CTA/P not done due to contrast allergy. She was not a thrombolytics candidate because she did not have any focal disabling deficits, and her INR was 1.98 and elevated PT of 21.9.  She was not a thrombectomy candidate as her NIHSS was 0.        #TIA likely    Recommendations  -EP to interrogate and see if PPM can have an MRI  -If patient cannot have MRI can get carotid US  -2D Echo  -monitor on tele  -Consider switching coumadin to Eliquis   -F/U cardiology cs  -Dysphagia screen  -LDL is 56 so will continue Atorvastatin 40mg QD.  A1c 7.5  -PT eval  -Neurochecks/VS q 4, FS q 6 hours x 24 hours    D/W Dr. Nesbitt    D/W Dr. Nesbitt 89 y/o F w/ PMH of HTN, dyslipidemia, PPM, paroxysmal a-fib (on coumadin-last INR here 1.98), TIA 2020 presenting with expressive aphasia, DM2, p/w aphasia.   Patient states she finished her dinner and was feeling like her normal self, and then around 6pm yesterday she was not able to get words out when speaking to her . This lasted 10 minutes and then self resolved.  There was not facial droop, focal weakness/numbness, diplopia, unsteady gait, dizziness.  States she was able to walk herself to the bathroom and back after the episode.  In the ED code stroke was called.  NIHSS 0, initial /51.  CTH was neg for acute bleed, ischemia.  CTA/P not done due to contrast allergy. She was not a thrombolytics candidate because she did not have any focal disabling deficits, and her INR was 1.98 and elevated PT of 21.9.  She was not a thrombectomy candidate as her NIHSS was 0.        #TIA likely    Recommendations  -EP to interrogate and see if PPM can have an MRI  -If patient cannot have MRI can get carotid US  -2D Echo  -monitor on tele  -Consider switching coumadin to Eliquis   -F/U cardiology cs  -Dysphagia screen  -LDL is 56 so will continue Atorvastatin 40mg QD.  A1c 7.5  -PT eval  -Neurochecks/VS q 4, FS q 6 hours x 24 hours    D/W Dr. Nesbitt

## 2024-03-12 NOTE — PATIENT PROFILE ADULT - FUNCTIONAL ASSESSMENT - DAILY ACTIVITY SECTION LABEL
Giovanna Urbina Patient Age: 55 year old  MESSAGE:   Left message for patient to return call and schedule with a Therapist per Dr White       WEIGHT AND HEIGHT:   Wt Readings from Last 1 Encounters:   No data found for Wt     Ht Readings from Last 1 Encounters:   No data found for Ht     BMI Readings from Last 1 Encounters:   No data found for BMI       ALLERGIES:  Patient has no allergy information on record.  No current outpatient medications on file.     No current facility-administered medications for this visit.     PHARMACY to use:            Pharmacy preference(s) on file: No Pharmacies Listed    CALL BACK INFO: DO NOT LEAVE A MESSAGE - contact patient directly  ROUTING: Patient's physician/staff        PCP: Verify Pcp         INS: Payor: [employer] / Plan: OPEN ACCESS PLUS / Product Type: POS MISC   PATIENT ADDRESS:  [id number] [facility] [id number]    
Patient called back, PSR relayed message. Patient stated that our office was supposed to call her to see if her insurance covers therapy.  PSR also told her she can contact her insurance provider as well, but wanted me to send a message anyway.   
Patient stated that she would call back in 20 minutes    Checked on the Provider's Credentialing list and [employer] was accepted - All thought patient needs to check with her insurance plan    Please schedule with a therapist when she calls back  
.

## 2024-03-12 NOTE — PATIENT PROFILE ADULT - OVER THE PAST TWO WEEKS, HAVE YOU FELT LITTLE INTEREST OR PLEASURE IN DOING THINGS?
Due to two recent visits to  for intially SOB and CP and now concurrent LLE Pain we need to rule out CLOT TO LUNGS; other and I am sending you for STAT CT ANGIO chest.    Please also see CARDIOLOGY as soon as you can and schedule a FOLLOW UP visit with me to review your results of labs and the scan which should be done this evening.  If it is after 6:30 PM it will be called to the physician on call and they will contact you; if earlier you will hear from me directly.   no

## 2024-03-12 NOTE — SWALLOW BEDSIDE ASSESSMENT ADULT - SWALLOW EVAL: RECOMMENDED DIET
SUGGEST A REGULAR TEXTURE DIET WITH THIN LIQUID CONSISTENCIES AS THE PATIENT TOLERATED THE SAME FROM AN OROPHARYNGEAL SWALLOWING STANCE ON EXAM.

## 2024-03-12 NOTE — PROGRESS NOTE ADULT - ASSESSMENT
89 y/o F w/ PMH of HTN, dyslipidemia, PPM, paroxysmal a-fib (on coumadin), TIAs, DM2, p/w aphasia HEAD CT: Mild volume loss, microvascular disease, no acute hemorrhage or midline shift..    *Aphasia, resolved, verbal on my exam w/o ho TIA  -Patient evaluated by Neuro, recommends MRI / MRA. However, patient believes that PPM is not compatible with MRI. F/u EP   -CTH: No acute findings   -Neuro checks  -Neuro consult  -ASA allergy (patient states she develops hives w/ ASA use)   -Statin  -Lipid panel / A1c  -Tele monitoring  -Echo  -PT consult  -Speech and swallow consult  -Fall precautions     *Essential Hypertension  -changed Cardizem CD to short acting to acutely address episodic systolic exacerbations, 2nd one time dose of labetolol  -re-assess antihypertensive regimen during stay    *Elevated troponin   -Trend troponins  -Therapeutic INR on coumadin   -ASA allergy   -Cardio consult  -Tele monitoring  -Echo  -EKG Reviewed   -EP consult to interrogate PPM     *Paroxysmal A-fib  -INR therapeutic on coumadin     *DM2  -Humalog ISS + Basal insulin   -Diabetic diet     *H/o dyslipidemia  -C/w home meds     *Mild thrombocytopenia   -F/u outpatient for further management if remains stable     *DVT ppx   -Therapeutic INR on coumadin     >75 mins required for admission

## 2024-03-12 NOTE — PHYSICAL THERAPY INITIAL EVALUATION ADULT - RANGE OF MOTION EXAMINATION, REHAB EVAL
left knee 0-60 deg/bilateral upper extremity ROM was WFL (within functional limits)/bilateral lower extremity ROM was WFL (within functional limits)

## 2024-03-12 NOTE — SWALLOW BEDSIDE ASSESSMENT ADULT - NS SPL SWALLOW CLINIC TRIAL FT
The pt exhibited functional Oropharyngeal Swallowing abilities for age. No behavioral aspiration signs exhibited. Bolus formation/transfer were mechanically functional for age, pt cleared oral debris after age acceptable piecemeal deglutition, swallow triggered in acceptable time frame for age, & laryngeal lift on palpation was felt to be functional for age as well. No  behavioral aspiration signs exhibited as previously stated. No change in O2 sats noted. Odynophagia was denied.

## 2024-03-12 NOTE — SWALLOW BEDSIDE ASSESSMENT ADULT - SWALLOW EVAL: CRITERIA FOR SKILLED INTERVENTION MET
NO NEED FOR FOR SPEECH OR SWALLOWING THERAPY IN ACUTE HOSPITAL SETTING AT THIS TIME. PT'S SPEECH-LANGUAGE AND OROPHARYNGEAL SWALLOWING ABILITIES ARE WITHIN FUNCTIONAL PARAMETERS. PT IS AT COMMUNICATIVE/SWALLOWING BASELINE AND ACUTE ST WOULD NOT BE OF BENEFIT IN HOSPITAL. GIVEN ABOVE, THIS SERVICE WILL NOT ACTIVELY FOLLOW. RECONSULT PRN SHOULD STATUS CHANGE AND CONDITION WARRANT.

## 2024-03-12 NOTE — CONSULT NOTE ADULT - SUBJECTIVE AND OBJECTIVE BOX
Patient is a 89y old  Female who presents with a chief complaint of aphasia (11 Mar 2024 23:55)      HPI:  89 y/o F w/ PMH of HTN, dyslipidemia, PPM, paroxysmal a-fib (on coumadin-last INR here 1.98), TIA 2020 presenting with expressive aphasia, DM2, p/w aphasia.   Patient states she finished her dinner and was feeling like her normal self, and then around 6pm yesterday she was not able to get words out when speaking to her . This lasted 10 minutes and then self resolved.  There was not facial droop, focal weakness/numbness, diplopia, unsteady gait, dizziness.  States she was able to walk herself to the bathroom and back after the episode.  In the ED code stroke was called.  NIHSS 0, initial /51.  CTH was neg for acute bleed, ischemia.  CTA/P not done due to contrast allergy. She was not a thrombolytics candidate because she did not have any focal disabling deficits, and her INR was 1.98 and elevated PT of 21.9.  She was not a thrombectomy candidate as her NIHSS was 0.        PSH: TAVR, PPM placement, hysterectomy, appendectomy     Social Hx: Denies tobacco / etoh / drugs     Family Hx: No pertinent hx    (11 Mar 2024 23:55)      PAST MEDICAL & SURGICAL HISTORY:  HTN (hypertension)      Paroxysmal atrial fibrillation      Type 2 diabetes mellitus with complication, with long-term current use of insulin      Carotid atherosclerosis  bilateral      AV block, 2nd degree      Carpal tunnel syndrome      HLD (hyperlipidemia)      Sensory neuropathy      Sick sinus syndrome      Vertigo      Arthritis      Spinal stenosis      GIACOMO (obstructive sleep apnea)      H/O: hysterectomy  due to fibroids      History of appendectomy      Artificial cardiac pacemaker  6/ 2009      History of transcatheter aortic valve replacement (TAVR)  4/2019      History of cholecystectomy          FAMILY HISTORY: Non-contributory      Social Hx:  Nonsmoker, no drug or alcohol use    MEDICATIONS  (STANDING):  atorvastatin 40 milliGRAM(s) Oral at bedtime  dextrose 5%. 1000 milliLiter(s) (50 mL/Hr) IV Continuous <Continuous>  dextrose 5%. 1000 milliLiter(s) (100 mL/Hr) IV Continuous <Continuous>  dextrose 50% Injectable 25 Gram(s) IV Push once  dextrose 50% Injectable 12.5 Gram(s) IV Push once  dextrose 50% Injectable 25 Gram(s) IV Push once  diltiazem    Tablet 60 milliGRAM(s) Oral three times a day  glucagon  Injectable 1 milliGRAM(s) IntraMuscular once  insulin glargine Injectable (LANTUS) 15 Unit(s) SubCutaneous at bedtime  insulin lispro (ADMELOG) corrective regimen sliding scale   SubCutaneous three times a day before meals  insulin lispro (ADMELOG) corrective regimen sliding scale   SubCutaneous at bedtime  labetalol 100 milliGRAM(s) Oral once  metoprolol succinate ER 50 milliGRAM(s) Oral daily       Allergies  shellfish (Anaphylaxis)  aspirin (Rash)        ROS: Pertinent positives in HPI, all other ROS were reviewed and are negative.      Vital Signs Last 24 Hrs  T(C): 36.6 (12 Mar 2024 12:52), Max: 36.9 (11 Mar 2024 19:50)  T(F): 97.9 (12 Mar 2024 12:52), Max: 98.4 (11 Mar 2024 19:50)  HR: 66 (12 Mar 2024 12:52) (60 - 76)  BP: 182/57 (12 Mar 2024 12:52) (137/32 - 202/51)  BP(mean): 90 (12 Mar 2024 12:52) (83 - 101)  RR: 18 (12 Mar 2024 12:52) (16 - 20)  SpO2: 98% (12 Mar 2024 12:52) (95% - 100%)        Constitutional: awake, NAD  HEAD: Normocephalic  Neck: Supple.  Extremities:  no edema  Musculoskeletal: no abnormal movements  Skin: No rashes    Neurological exam:  HF: A x O x 3. Appropriately interactive, normal affect. Speech fluent, No Aphasia or paraphasic errors. Naming /repetition intact   CN: TEN, EOMI, VFF, facial sensation normal, no NLFD, tongue midline, Palate moves equally, SCM equal bilaterally  Motor: No pronator drift, Strength 5/5 in all 4 ext  Sens: Intact to light touch   Reflexes: Symmetric and normal, downgoing toes b/l  Coord:  No FNFA, unable to do HTS 2/2 LE arthritic pain  Gait/Balance: Cannot test    NIHSS: 0          Labs:   03-12    139  |  109<H>  |  23  ----------------------------<  165<H>  3.9   |  25  |  1.16    Ca    8.7      12 Mar 2024 07:27    TPro  7.6  /  Alb  3.0<L>  /  TBili  0.5  /  DBili  x   /  AST  26  /  ALT  19  /  AlkPhos  132<H>  03-11    03-12 Chol 123 LDL -- HDL 50<L> Trig 91                          12.5   9.37  )-----------( 140      ( 11 Mar 2024 20:11 )             38.9       Radiology:  < from: CT Brain Stroke Protocol (03.11.24 @ 19:36) >  IMPRESSION:  HEAD CT: Mild volume loss, microvascular disease, no acute hemorrhage or   midline shift.        A/P:    No IV tpa given because…    -ASA/PLAVIX  -Atorvastatin  -DVT prophylaxis  -Dysphagia screen  -Speech and swallow eval  -PT eval/ rehab eval    Mangement d/w Pt / family /     Total Critical Care Time spent:   Patient is a 89y old  Female who presents with a chief complaint of aphasia (11 Mar 2024 23:55)      HPI:  89 y/o F w/ PMH of HTN, dyslipidemia, PPM, paroxysmal a-fib (on coumadin-last INR here 1.98), TIA 2020 presenting with expressive aphasia, DM2, p/w aphasia.   Patient states she finished her dinner and was feeling like her normal self, and then around 6pm yesterday she was not able to get words out when speaking to her . This lasted 10 minutes and then self resolved.  There was not facial droop, focal weakness/numbness, diplopia, unsteady gait, dizziness.  States she was able to walk herself to the bathroom and back after the episode.  In the ED code stroke was called.  NIHSS 0, initial /51.  CTH was neg for acute bleed, ischemia.  CTA/P not done due to contrast allergy. She was not a thrombolytics candidate because she did not have any focal disabling deficits, and her INR was 1.98 and elevated PT of 21.9.  She was not a thrombectomy candidate as her NIHSS was 0.        PSH: TAVR, PPM placement, hysterectomy, appendectomy     Social Hx: Denies tobacco / etoh / drugs     Family Hx: No pertinent hx    (11 Mar 2024 23:55)      PAST MEDICAL & SURGICAL HISTORY:  HTN (hypertension)      Paroxysmal atrial fibrillation      Type 2 diabetes mellitus with complication, with long-term current use of insulin      Carotid atherosclerosis  bilateral      AV block, 2nd degree      Carpal tunnel syndrome      HLD (hyperlipidemia)      Sensory neuropathy      Sick sinus syndrome      Vertigo      Arthritis      Spinal stenosis      GIACOMO (obstructive sleep apnea)      H/O: hysterectomy  due to fibroids      History of appendectomy      Artificial cardiac pacemaker  6/ 2009      History of transcatheter aortic valve replacement (TAVR)  4/2019      History of cholecystectomy          FAMILY HISTORY: Non-contributory      Social Hx:  Nonsmoker, no drug or alcohol use    MEDICATIONS  (STANDING):  atorvastatin 40 milliGRAM(s) Oral at bedtime  dextrose 5%. 1000 milliLiter(s) (50 mL/Hr) IV Continuous <Continuous>  dextrose 5%. 1000 milliLiter(s) (100 mL/Hr) IV Continuous <Continuous>  dextrose 50% Injectable 25 Gram(s) IV Push once  dextrose 50% Injectable 12.5 Gram(s) IV Push once  dextrose 50% Injectable 25 Gram(s) IV Push once  diltiazem    Tablet 60 milliGRAM(s) Oral three times a day  glucagon  Injectable 1 milliGRAM(s) IntraMuscular once  insulin glargine Injectable (LANTUS) 15 Unit(s) SubCutaneous at bedtime  insulin lispro (ADMELOG) corrective regimen sliding scale   SubCutaneous three times a day before meals  insulin lispro (ADMELOG) corrective regimen sliding scale   SubCutaneous at bedtime  labetalol 100 milliGRAM(s) Oral once  metoprolol succinate ER 50 milliGRAM(s) Oral daily       Allergies  shellfish (Anaphylaxis)  aspirin (Rash)        ROS: Pertinent positives in HPI, all other ROS were reviewed and are negative.      Vital Signs Last 24 Hrs  T(C): 36.6 (12 Mar 2024 12:52), Max: 36.9 (11 Mar 2024 19:50)  T(F): 97.9 (12 Mar 2024 12:52), Max: 98.4 (11 Mar 2024 19:50)  HR: 66 (12 Mar 2024 12:52) (60 - 76)  BP: 182/57 (12 Mar 2024 12:52) (137/32 - 202/51)  BP(mean): 90 (12 Mar 2024 12:52) (83 - 101)  RR: 18 (12 Mar 2024 12:52) (16 - 20)  SpO2: 98% (12 Mar 2024 12:52) (95% - 100%)        Constitutional: awake, NAD  HEAD: Normocephalic  Neck: Supple.  Extremities:  no edema  Musculoskeletal: no abnormal movements  Skin: No rashes    Neurological exam:  HF: A x O x 3. Appropriately interactive, normal affect. Speech fluent, No Aphasia or paraphasic errors. Naming /repetition intact   CN: TEN, EOMI, VFF, facial sensation normal, no NLFD, tongue midline, Palate moves equally, SCM equal bilaterally  Motor: No pronator drift, Strength 5/5 in all 4 ext  Sens: Intact to light touch   Reflexes: Symmetric and normal, downgoing toes b/l  Coord:  No FNFA, unable to do HTS 2/2 LE arthritic pain  Gait/Balance: Cannot test    NIHSS: 0          Labs:   03-12    139  |  109<H>  |  23  ----------------------------<  165<H>  3.9   |  25  |  1.16    Ca    8.7      12 Mar 2024 07:27    TPro  7.6  /  Alb  3.0<L>  /  TBili  0.5  /  DBili  x   /  AST  26  /  ALT  19  /  AlkPhos  132<H>  03-11    03-12 Chol 123 LDL -- HDL 50<L> Trig 91                          12.5   9.37  )-----------( 140      ( 11 Mar 2024 20:11 )             38.9       Radiology:  < from: CT Brain Stroke Protocol (03.11.24 @ 19:36) >  IMPRESSION:  HEAD CT: Mild volume loss, microvascular disease, no acute hemorrhage or   midline shift.

## 2024-03-13 NOTE — DIETITIAN INITIAL EVALUATION ADULT - NAME AND PHONE
Brenda Moran RDN, CDN, [facility]      [phone]   [email or web address]

## 2024-03-13 NOTE — DIETITIAN INITIAL EVALUATION ADULT - OTHER INFO
89 y/o F w/ PMH of HTN, dyslipidemia, PPM, paroxysmal a-fib (on coumadin), TIAs, DM2, p/w aphasia. Patient states she finished her dinner and was feeling like her normal self, and then around 6pm her speech became abnormal when speaking to her . States her speech was coming out garbled, and this lasted for 10 minutes before resolving spontaneously. States she was able to walk herself to the bathroom and back after the episode. Denies any facial droop, weakness in arms / legs, sensory deficits, dysphadia, CP, SOB, nausea, vomiting, abdominal pain     Admit dx  TIA  Visited pt at bedside, she has a one-to-one  RD bedscale wt is 94 kg   208#  Pt reports that she's had some weight loss in the past few weeks, she doesn't know how much, but says it was "not  on purpose."  NFPE reveals muscle wasting, fat wasting   PO intake estimated < 75% ENN > one month  Pt on warfarin  Pt has t2DM, HgbA1c is 7.5  Pt on POCT, elevated BG  A target glucose range of 140-180 mg/dL is recommended for most critically ill and non-critically ill patients.  At home, pt on glargine Januvia  Pt is wearing a CGM, Freestyle Pelon  Pt on 15U lantus at , corrective and nutritional bolus  She is very happy with it  Pt has upper and lower dentures  Elevated troponin  SLP recommends regular texture diet, thin liquids  Suggest liberalize diet to regular to maximize intake  Confirm Goals of Care regarding nutrition support   Recommendations to follow in Plan/Intervention

## 2024-03-13 NOTE — DIETITIAN INITIAL EVALUATION ADULT - ADD RECOMMEND
Liberalize diet to Regular to optimize po/nutrient intake.   MVI w/ minerals daily to ensure 100% RDA met   Record PO intake in EMR after each meal (nursing.)   Consider adding thiamine 100 mg daily 2/2 poor PO intake/ malnutrition  A target glucose range of 140-180 mg/dL is recommended for most critically ill and non-critically ill patients.   Maintain POCT  suggest Confirm Goals of Care regarding nutrition support   Monitor bowel movements, if no BM for >3 days, consider implementing bowel regimen.   Monitor PO intake, tolerance, labs and weight.

## 2024-03-13 NOTE — PROCEDURE NOTE - ADDITIONAL PROCEDURE DETAILS
normal function dual chamber PPM.   atrial pacing ~65%  Pacemaker system is MRI conditional, would recommend DOO 80 programming for the duration of mri.

## 2024-03-13 NOTE — DIETITIAN INITIAL EVALUATION ADULT - ORAL INTAKE PTA/DIET HISTORY
Pt reports that her sons shop and cook.  Pt says she gets a lot of takeout, and eats lean cuisine and other frozen meals.  He usually eats breakfast and dinner.  Pt reports that she does not eat as many vegetables "as she should", because she is on Warfaring.  Discussed consistent  vitamin K intake with patient.  Based on her dietary recall, PO intake estimated < 75% ENN > one month.

## 2024-03-13 NOTE — DIETITIAN NUTRITION RISK NOTIFICATION - TREATMENT: THE FOLLOWING DIET HAS BEEN RECOMMENDED
Diet, DASH/TLC:   Sodium & Cholesterol Restricted  Consistent Carbohydrate {Evening Snack} (CSTCHOSN) (03-12-24 @ 00:43) [Active]

## 2024-03-13 NOTE — PROGRESS NOTE ADULT - SUBJECTIVE AND OBJECTIVE BOX
Peter Estrada MD  [facility] Medicine  Contact via Teams or text/call at [phone]    Patient is a 89y old  Female who presents with a chief complaint of aphasia (12 Mar 2024 15:36)        Patient seen and examined at bedside. No overnight events reported.     ALLERGIES:  shellfish (Anaphylaxis)  aspirin (Rash)    MEDICATIONS  (STANDING):  atorvastatin 40 milliGRAM(s) Oral at bedtime  dextrose 5%. 1000 milliLiter(s) (50 mL/Hr) IV Continuous <Continuous>  dextrose 5%. 1000 milliLiter(s) (100 mL/Hr) IV Continuous <Continuous>  dextrose 50% Injectable 25 Gram(s) IV Push once  dextrose 50% Injectable 12.5 Gram(s) IV Push once  dextrose 50% Injectable 25 Gram(s) IV Push once  diltiazem    Tablet 60 milliGRAM(s) Oral three times a day  glucagon  Injectable 1 milliGRAM(s) IntraMuscular once  insulin glargine Injectable (LANTUS) 15 Unit(s) SubCutaneous at bedtime  insulin lispro (ADMELOG) corrective regimen sliding scale   SubCutaneous three times a day before meals  insulin lispro (ADMELOG) corrective regimen sliding scale   SubCutaneous at bedtime  melatonin 3 milliGRAM(s) Oral at bedtime  metoprolol succinate ER 50 milliGRAM(s) Oral daily    MEDICATIONS  (PRN):  acetaminophen     Tablet .. 650 milliGRAM(s) Oral every 6 hours PRN Mild Pain (1 - 3)  dextrose Oral Gel 15 Gram(s) Oral once PRN Blood Glucose LESS THAN 70 milliGRAM(s)/deciliter  meclizine 25 milliGRAM(s) Oral every 8 hours PRN Dizziness    Vital Signs Last 24 Hrs  T(F): 98 (13 Mar 2024 07:28), Max: 98.1 (12 Mar 2024 21:08)  HR: 75 (13 Mar 2024 07:28) (66 - 85)  BP: 185/50 (13 Mar 2024 07:28) (137/32 - 191/67)  RR: 18 (13 Mar 2024 07:28) (17 - 20)  SpO2: 98% (13 Mar 2024 07:28) (90% - 99%)  I&O's Summary    12 Mar 2024 07:01  -  13 Mar 2024 07:00  --------------------------------------------------------  IN: 0 mL / OUT: 400 mL / NET: -400 mL      PHYSICAL EXAM:  General: NAD, A/O x 3  ENT: No gross hearing impairment, Moist mucous membranes, no thrush  Neck: Supple, No JVD  Lungs: Clear to auscultation bilaterally, good air entry, non-labored breathing  Cardio: RRR, S1/S2, No murmur  Abdomen: Soft, Nontender, Nondistended; Bowel sounds present  Extremities: No calf tenderness, No cyanosis, No pitting edema  Psych: Appropriate mood and affect    LABS:                        11.4   13.43 )-----------( 139      ( 13 Mar 2024 06:46 )             35.1     03-13    138  |  108  |  21  ----------------------------<  189  3.7   |  24  |  1.01    Ca    8.4      13 Mar 2024 06:46  Phos  3.3     03-13  Mg     1.7     03-13    TPro  7.0  /  Alb  2.5  /  TBili  1.0  /  DBili  0.3  /  AST  16  /  ALT  11  /  AlkPhos  104  03-13          PT/INR - ( 12 Mar 2024 07:27 )   PT: 24.4 sec;   INR: 2.21 ratio         PTT - ( 11 Mar 2024 22:15 )  PTT:35.6 sec      CARDIAC MARKERS ( 13 Mar 2024 06:46 )  x     / 204.90 ng/L / x     / x     / x      CARDIAC MARKERS ( 13 Mar 2024 02:27 )  x     / 204.77 ng/L / x     / x     / x      CARDIAC MARKERS ( 12 Mar 2024 00:37 )  x     / 243.15 ng/L / x     / x     / x      CARDIAC MARKERS ( 11 Mar 2024 20:11 )  x     / 248.41 ng/L / x     / x     / x        03-12 Chol 123 mg/dL LDL -- HDL 50 mg/dL Trig 91 mg/dL  TSH 0.60   TSH with FT4 reflex --  Total T3 --    POCT Blood Glucose.: 195 mg/dL (13 Mar 2024 07:48)  POCT Blood Glucose.: 185 mg/dL (13 Mar 2024 02:03)  POCT Blood Glucose.: 213 mg/dL (12 Mar 2024 20:25)  POCT Blood Glucose.: 161 mg/dL (12 Mar 2024 17:24)  POCT Blood Glucose.: 171 mg/dL (12 Mar 2024 13:04)      Urinalysis Basic - ( 13 Mar 2024 06:46 )    Color: x / Appearance: x / SG: x / pH: x  Gluc: 189 mg/dL / Ketone: x  / Bili: x / Urobili: x   Blood: x / Protein: x / Nitrite: x   Leuk Esterase: x / RBC: x / WBC x   Sq Epi: x / Non Sq Epi: x / Bacteria: x          RADIOLOGY & ADDITIONAL TESTS:    Care Discussed with Consultants/Other Providers:

## 2024-03-13 NOTE — DISCHARGE NOTE NURSING/CASE MANAGEMENT/SOCIAL WORK - PATIENT PORTAL LINK FT
You can access the FollowMyHealth Patient Portal offered by [facility] by registering at the following website: [email or web address]. By joining [employer]’s FollowMyHealth portal, you will also be able to view your health information using other applications (apps) compatible with our system.

## 2024-03-13 NOTE — DIETITIAN INITIAL EVALUATION ADULT - PERTINENT LABORATORY DATA
03-13    138  |  108  |  21  ----------------------------<  189<H>  3.7   |  24  |  1.01    Ca    8.4<L>      13 Mar 2024 06:46  Phos  3.3     03-13  Mg     1.7     03-13    TPro  7.0  /  Alb  2.5<L>  /  TBili  1.0  /  DBili  0.3  /  AST  16  /  ALT  11<L>  /  AlkPhos  104  03-13  POCT Blood Glucose.: 195 mg/dL (03-13-24 @ 07:48)  A1C with Estimated Average Glucose Result: 7.5 % (03-12-24 @ 07:27)

## 2024-03-13 NOTE — PROGRESS NOTE ADULT - ASSESSMENT
89 y/o F w/ PMH of HTN, dyslipidemia, PPM, paroxysmal a-fib (on coumadin), TIAs, DM2, p/w aphasia HEAD CT: Mild volume loss, microvascular disease, no acute hemorrhage or midline shift..    Aphasia suspected secondary to TIA  - symptoms resolved  - Neuro recommends MRI / MRA. however, patient believes that PPM is not compatible with MRI. F/u EP   - CTH: No acute findings   - Neuro checks  - ASA allergy (patient states she develops hives w/ ASA use) , consider plavix? will discuss with neuro  - Statin  - Lipid panel / A1c  - Tele monitoring  - Echo  - PT consult  - Speech and swallow consult  - Fall precautions     Essential Hypertension  - increase diltiazem to 60 q6h  - goal 150-170 sbp    *Elevated troponin, suspect demand ischemia  -Therapeutic INR on coumadin   -ASA allergy   -Cardio consult  -Tele monitoring  -Echo  -EKG Reviewed   -EP consult to interrogate PPM     Paroxysmal A-fib  -INR therapeutic on coumadin     DM2  -Humalog ISS + Basal insulin   -Diabetic diet     H/o dyslipidemia  -C/w home meds     Mild thrombocytopenia   -F/u outpatient for further management if remains stable     DVT ppx   -Therapeutic INR on coumadin

## 2024-03-13 NOTE — CONSULT NOTE ADULT - SUBJECTIVE AND OBJECTIVE BOX
Patient is a 89y old  Female who presents with a chief complaint of Transient cerebral ischemia     (13 Mar 2024 09:36)    ________________________________  WMarquita RUBIO is a 89y year old Female with a past medical history of severe stenosis status post TAVR in 2019, paroxysmal atrial fibrillation on anticoagulation with warfarin, history of TIAs in the past, hypertension, hyperlipidemia, dual-chamber pacemaker, who presented for evaluation of aphasia and diagnosed with TIA.  Patient was eating dinner and had abnormal speech.  Symptoms resolved spontaneously.  No chest discomfort.  No palpitations.  No shortness of breath.  CT showed no acute abnormalities.  No CTA done due to contrast allergy.  Echo showed preserved LVEF.  Sinus with AV paced on telemetry.  Device interrogation showed no significant arrhythmias.      Paroxysmal atrial fibrillation on DOACHPI:  89 y/o F w/ PMH of HTN, dyslipidemia, PPM, paroxysmal a-fib (on coumadin), TIAs, DM2, p/w aphasia. Patient states she finished her dinner and was feeling like her normal self, and then around 6pm her speech became abnormal when speaking to her . States her speech was coming out garbled, and this lasted for 10 minutes before resolving spontaneously. States she was able to walk herself to the bathroom and back after the episode. Denies any facial droop, weakness in arms / legs, sensory deficits, dysphadia, CP, SOB, nausea, vomiting, abdominal pain       PREVIOUS CARDIAC WORKUP:    Echocardiogram 3/12/24   Well seated prosthetic valve in the aortic position. Peak   trans-prosthetic   gradient is within normal limitations for this type of prosthesis.   The left atrium is mildly dilated.   Mild asymmetrical septalleft ventricular hypertrophy is present.   The left ventricle is normal in wall motion, and contractility.   Estimated left ventricular ejection fraction is 55-60 %.   Diastolic dysfunction is present.   The IVC appears normal in size.   Moderate to severe mitral annular calcification is present.   There is calcification of mitral valve leaflets. The leaflet opening is   restricted.   Mean transmitral gradient is 5 mmHg; this finding is consistent with mild   to moderate mitral stenosis.   Moderate (2+) mitral regurgitation is present.   No evidence of pericardial effusion.   No evidence of pleural effusion.   Pulmonic valve not well seen.   Normal appearing right atrium.   A device wire is seen in the RV and RA.   The right ventricle is not well seen, appears grossly normal in size.   The tricuspid valve leaflets are thin and pliable; valve motion is   normal.   Mild (1+) tricuspid valve regurgitation is present.   Moderate pulmonary hypertension.        ________________________________  Review of systems: A 10 point review of system has been performed, and is negative except for what has been mentioned in the above history of present illness.     PAST MEDICAL & SURGICAL HISTORY:  HTN (hypertension)      Paroxysmal atrial fibrillation      Type 2 diabetes mellitus with complication, with long-term current use of insulin      Carotid atherosclerosis  bilateral      AV block, 2nd degree      Carpal tunnel syndrome      HLD (hyperlipidemia)      Sensory neuropathy      Sick sinus syndrome      Vertigo      Arthritis      Spinal stenosis      GIACOMO (obstructive sleep apnea)      H/O: hysterectomy  due to fibroids      History of appendectomy      Artificial cardiac pacemaker  6/ 2009      History of transcatheter aortic valve replacement (TAVR)  4/2019      History of cholecystectomy      PSH: TAVR, PPM placement, hysterectomy, appendectomy     Social Hx: Denies tobacco / etoh / drugs     Family Hx: No pertinent hx     ALLERGIES:  shellfish (Anaphylaxis)  aspirin (Rash)    Home Medications:  atorvastatin 20 mg oral tablet: 1 tab(s) orally once a day (at bedtime) (11 Mar 2024 22:14)  insulin glargine: 15 unit(s) subcutaneous once a day (at bedtime) (11 Mar 2024 22:14)  Januvia 25 mg oral tablet: 1 tab(s) orally once a day (11 Mar 2024 22:17)  Toprol-XL 50 mg oral tablet, extended release: 1 tab(s) orally once a day (at bedtime) (11 Mar 2024 22:15)  Tylenol Extra Strength 500 mg oral tablet: 2 tab(s) orally 3 times a day as needed for back pain (11 Mar 2024 22:17)  warfarin 1 mg oral tablet: 0.5 tab(s) orally once a week on Saturdays ***Pt takes 2mg 6 days a week Sunday through Friday, and 2.5mg on Saturdays*** (11 Mar 2024 22:17)  warfarin 2 mg oral tablet: 1 tab(s) orally once a day (at bedtime) ***Pt takes 2mg 6 days a week Sunday through Friday and 2.5mg on Saturday*** (11 Mar 2024 22:16)    MEDICATIONS  (STANDING):  atorvastatin 40 milliGRAM(s) Oral at bedtime  dextrose 5%. 1000 milliLiter(s) (50 mL/Hr) IV Continuous <Continuous>  dextrose 5%. 1000 milliLiter(s) (100 mL/Hr) IV Continuous <Continuous>  dextrose 50% Injectable 12.5 Gram(s) IV Push once  dextrose 50% Injectable 25 Gram(s) IV Push once  dextrose 50% Injectable 25 Gram(s) IV Push once  diltiazem    Tablet 60 milliGRAM(s) Oral four times a day  glucagon  Injectable 1 milliGRAM(s) IntraMuscular once  insulin glargine Injectable (LANTUS) 15 Unit(s) SubCutaneous at bedtime  insulin lispro (ADMELOG) corrective regimen sliding scale   SubCutaneous three times a day before meals  insulin lispro (ADMELOG) corrective regimen sliding scale   SubCutaneous at bedtime  lidocaine   4% Patch 1 Patch Transdermal daily  melatonin 3 milliGRAM(s) Oral at bedtime  metoprolol succinate ER 50 milliGRAM(s) Oral daily    MEDICATIONS  (PRN):  acetaminophen     Tablet .. 650 milliGRAM(s) Oral every 6 hours PRN Mild Pain (1 - 3)  dextrose Oral Gel 15 Gram(s) Oral once PRN Blood Glucose LESS THAN 70 milliGRAM(s)/deciliter  meclizine 25 milliGRAM(s) Oral every 8 hours PRN Dizziness    Vital Signs Last 24 Hrs  T(C): 36.7 (13 Mar 2024 07:28), Max: 36.7 (12 Mar 2024 21:08)  T(F): 98 (13 Mar 2024 07:28), Max: 98.1 (12 Mar 2024 21:08)  HR: 75 (13 Mar 2024 07:28) (74 - 85)  BP: 185/50 (13 Mar 2024 07:28) (144/41 - 185/50)  BP(mean): --  RR: 18 (13 Mar 2024 07:28) (18 - 18)  SpO2: 98% (13 Mar 2024 07:28) (90% - 98%)    Parameters below as of 13 Mar 2024 07:28  Patient On (Oxygen Delivery Method): room air      I&O's Summary    12 Mar 2024 07:01  -  13 Mar 2024 07:00  --------------------------------------------------------  IN: 0 mL / OUT: 400 mL / NET: -400 mL      ________________________________  GENERAL APPEARANCE:  No acute distress  HEAD: normocephalic, atraumatic  NECK: supple, no jugular venous distention, no carotid bruit    HEART: Regular rate and rhythm, S1, S2 normal, 1/6 murmur    CHEST:  No anterior chest wall tenderness    LUNGS:  Clear to auscultation, without any wheezing, rhonchi or rales    ABDOMEN: soft, nontender, nondistended, with positive bowel sounds appreciated  EXTREMITIES: no edema.   NEURO: Alert and oriented x3  PSYC:  Normal affect  SKIN:  Dry  ________________________________   TELEMETRY: Sinus rhythm, AV placed    ECG: Sinus rhythm at 65 bpm, nonspecific changes, left axis, LVH    LABS:                        11.4   13.43 )-----------( 139      ( 13 Mar 2024 06:46 )             35.1             03-13    138  |  108  |  21  ----------------------------<  189<H>  3.7   |  24  |  1.01    Ca    8.4<L>      13 Mar 2024 06:46  Phos  3.3     03-13  Mg     1.7     03-13    TPro  7.0  /  Alb  2.5<L>  /  TBili  1.0  /  DBili  0.3  /  AST  16  /  ALT  11<L>  /  AlkPhos  104  03-13      Lipid Panel  Chl 123  HDL 50  LDL --  Trg 91  LIVER FUNCTIONS - ( 13 Mar 2024 06:46 )  Alb: 2.5 g/dL / Pro: 7.0 gm/dL / ALK PHOS: 104 U/L / ALT: 11 U/L / AST: 16 U/L / GGT: x         PT/INR - ( 12 Mar 2024 07:27 )   PT: 24.4 sec;   INR: 2.21 ratio         PTT - ( 11 Mar 2024 22:15 )  PTT:35.6 sec  Urinalysis Basic - ( 13 Mar 2024 06:46 )    Color: x / Appearance: x / SG: x / pH: x  Gluc: 189 mg/dL / Ketone: x  / Bili: x / Urobili: x   Blood: x / Protein: x / Nitrite: x   Leuk Esterase: x / RBC: x / WBC x   Sq Epi: x / Non Sq Epi: x / Bacteria: x        PT/INR - ( 12 Mar 2024 07:27 )   PT: 24.4 sec;   INR: 2.21 ratio         PTT - ( 11 Mar 2024 22:15 )  PTT:35.6 sec  Urinalysis Basic - ( 13 Mar 2024 06:46 )    Color: x / Appearance: x / SG: x / pH: x  Gluc: 189 mg/dL / Ketone: x  / Bili: x / Urobili: x   Blood: x / Protein: x / Nitrite: x   Leuk Esterase: x / RBC: x / WBC x   Sq Epi: x / Non Sq Epi: x / Bacteria: x     ________________________________    RADIOLOGY & ADDITIONAL STUDIES: IMPRESSION:  HEAD CT: Mild volume loss, microvascular disease, no acute hemorrhage or   midline shift.    ________________________________    ASSESSMENT:  Paroxysmal atrial fibrillation on anticoagulation with warfarin presenting with TIA  Severe aortic stenosis status post TAVR in 2019  Dual-chamber pacemaker  HTN  HLD  CKD  Demand ischemia       PLAN:  In summary, this is a 89y Female with a past medical history of AAS status post TAVR, paroxysmal atrial fibrillation, history of TIA, presenting with aphasia consistent with TIA.  Discussed transitioning to DOAC.  Patient wishes to discuss with family and think about it.  Her son and  is on Eliquis.  Discussed that this would be a good option given her recurrent events.  Echo reviewed, aortic valve prosthesis in place, normal functioning.  Permissive hypertension.  Continue diltiazem but switch to long-acting.  Continue beta-blocker.  Continue statin.  ASA allergy. Ck carotid doppler.    ____________________________________________  (Dragon Dictation software used). Thank you for allowing me to participate in the care of your patient. Please contact me should any questions arise.    ERA June DO, [facility]  Office: [phone]

## 2024-03-13 NOTE — DIETITIAN INITIAL EVALUATION ADULT - PERTINENT MEDS FT
MEDICATIONS  (STANDING):  atorvastatin 40 milliGRAM(s) Oral at bedtime  dextrose 5%. 1000 milliLiter(s) (50 mL/Hr) IV Continuous <Continuous>  dextrose 5%. 1000 milliLiter(s) (100 mL/Hr) IV Continuous <Continuous>  dextrose 50% Injectable 25 Gram(s) IV Push once  dextrose 50% Injectable 25 Gram(s) IV Push once  dextrose 50% Injectable 12.5 Gram(s) IV Push once  diltiazem    Tablet 60 milliGRAM(s) Oral four times a day  glucagon  Injectable 1 milliGRAM(s) IntraMuscular once  insulin glargine Injectable (LANTUS) 15 Unit(s) SubCutaneous at bedtime  insulin lispro (ADMELOG) corrective regimen sliding scale   SubCutaneous three times a day before meals  insulin lispro (ADMELOG) corrective regimen sliding scale   SubCutaneous at bedtime  melatonin 3 milliGRAM(s) Oral at bedtime  metoprolol succinate ER 50 milliGRAM(s) Oral daily    MEDICATIONS  (PRN):  acetaminophen     Tablet .. 650 milliGRAM(s) Oral every 6 hours PRN Mild Pain (1 - 3)  dextrose Oral Gel 15 Gram(s) Oral once PRN Blood Glucose LESS THAN 70 milliGRAM(s)/deciliter  meclizine 25 milliGRAM(s) Oral every 8 hours PRN Dizziness

## 2024-03-13 NOTE — DISCHARGE NOTE NURSING/CASE MANAGEMENT/SOCIAL WORK - NSDCPEFALRISK_GEN_ALL_CORE
For information on Fall & Injury Prevention, visit: [email or web address] OR  [email or web address] OR  https://www.cdc.gov/steadi/patient.html

## 2024-03-14 NOTE — PROGRESS NOTE ADULT - ASSESSMENT
89 y/o F w/ PMH of HTN, dyslipidemia, PPM, paroxysmal a-fib (on coumadin-last INR here 1.98), TIA 2020 presenting with expressive aphasia, DM2, p/w aphasia.   Patient states she finished her dinner and was feeling like her normal self, and then around 6pm yesterday she was not able to get words out when speaking to her . This lasted 10 minutes and then self resolved.  There was not facial droop, focal weakness/numbness, diplopia, unsteady gait, dizziness.  States she was able to walk herself to the bathroom and back after the episode.  In the ED code stroke was called.  NIHSS 0, initial /51.  CTH was neg for acute bleed, ischemia.  CTA/P not done due to contrast allergy. She was not a thrombolytics candidate because she did not have any focal disabling deficits, and her INR was 1.98 and elevated PT of 21.9.  She was not a thrombectomy candidate as her NIHSS was 0.        #TIA likely    Recommendations  -EP to interrogate and see if PPM can have an MRI  -If patient cannot have MRI can get carotid US  -2D Echo  -monitor on tele  -Consider switching coumadin to Eliquis   -F/U cardiology cs  -Dysphagia screen  -LDL is 56 so will continue Atorvastatin 40mg QD.  A1c 7.5  -PT eval  -Neurochecks/VS q 4, FS q 6 hours x 24 hours    D/W Dr. Nesbitt 89 y/o F w/ PMH of HTN, dyslipidemia, PPM, paroxysmal a-fib (on coumadin-last INR here 1.98), TIA 2020 presenting with expressive aphasia, DM2, p/w aphasia.   Patient states she finished her dinner and was feeling like her normal self, and then around 6pm yesterday she was not able to get words out when speaking to her . This lasted 10 minutes and then self resolved.  There was not facial droop, focal weakness/numbness, diplopia, unsteady gait, dizziness.  States she was able to walk herself to the bathroom and back after the episode.  In the ED code stroke was called.  NIHSS 0, initial /51.  CTH was neg for acute bleed, ischemia.  CTA/P not done due to contrast allergy. She was not a thrombolytics candidate because she did not have any focal disabling deficits, and her INR was 1.98 and elevated PT of 21.9.  She was not a thrombectomy candidate as her NIHSS was 0.        #TIA likely    Recommendations  -F/U MRI/A H&N  -2D Echo  -monitor on tele  -Consider switching coumadin to Eliquis   -F/U cardiology cs  -Dysphagia screen  -LDL is 56 so will continue Atorvastatin 40mg QD.  A1c 7.5  -PT eval  -Neurochecks/VS q 4, FS q 6 hours x 24 hours    D/W Dr. Nesbitt 89 y/o F w/ PMH of HTN, dyslipidemia, PPM, paroxysmal a-fib (on coumadin-last INR here 1.98), TIA 2020 presenting with expressive aphasia, DM2, p/w aphasia.   Patient states she finished her dinner and was feeling like her normal self, and then around 6pm yesterday she was not able to get words out when speaking to her . This lasted 10 minutes and then self resolved.  There was not facial droop, focal weakness/numbness, diplopia, unsteady gait, dizziness.  States she was able to walk herself to the bathroom and back after the episode.  In the ED code stroke was called.  NIHSS 0, initial /51.  CTH was neg for acute bleed, ischemia.  CTA/P not done due to contrast allergy. She was not a thrombolytics candidate because she did not have any focal disabling deficits, and her INR was 1.98 and elevated PT of 21.9.  She was not a thrombectomy candidate as her NIHSS was 0.        #TIA likely    Recommendations  -F/U MRI/A H&N  -2D Echo  -monitor on tele  -Consider switching coumadin to Eliquis-cardiology following  -LDL is 56 so will continue Atorvastatin 40mg QD.  A1c 7.5  -PT eval  -Neurochecks/VS q 4, FS q 6 hours x 24 hours    D/W Dr. Nesbitt

## 2024-03-14 NOTE — PROGRESS NOTE ADULT - SUBJECTIVE AND OBJECTIVE BOX
The patient was seen examined.  Agreeable to anticoagulation with Eliquis now given recurrent TIAs.  No chest discomfort.  No shortness of breath.  Sinus rhythm on telemetry with occasional pacing.    Initial Consult HPI    ________________________________  W. JOANNE is a 89y year old Female with a past medical history of severe stenosis status post TAVR in 2019, paroxysmal atrial fibrillation on anticoagulation with warfarin, history of TIAs in the past, hypertension, hyperlipidemia, dual-chamber pacemaker, who presented for evaluation of aphasia and diagnosed with TIA.  Patient was eating dinner and had abnormal speech.  Symptoms resolved spontaneously.  No chest discomfort.  No palpitations.  No shortness of breath.  CT showed no acute abnormalities.  No CTA done due to contrast allergy.  Echo showed preserved LVEF.  Sinus with AV paced on telemetry.  Device interrogation showed no significant arrhythmias.      Paroxysmal atrial fibrillation on DOACHPI:  89 y/o F w/ PMH of HTN, dyslipidemia, PPM, paroxysmal a-fib (on coumadin), TIAs, DM2, p/w aphasia. Patient states she finished her dinner and was feeling like her normal self, and then around 6pm her speech became abnormal when speaking to her . States her speech was coming out garbled, and this lasted for 10 minutes before resolving spontaneously. States she was able to walk herself to the bathroom and back after the episode. Denies any facial droop, weakness in arms / legs, sensory deficits, dysphadia, CP, SOB, nausea, vomiting, abdominal pain       PREVIOUS CARDIAC WORKUP:    Echocardiogram 3/12/24   Well seated prosthetic valve in the aortic position. Peak   trans-prosthetic   gradient is within normal limitations for this type of prosthesis.   The left atrium is mildly dilated.   Mild asymmetrical septalleft ventricular hypertrophy is present.   The left ventricle is normal in wall motion, and contractility.   Estimated left ventricular ejection fraction is 55-60 %.   Diastolic dysfunction is present.   The IVC appears normal in size.   Moderate to severe mitral annular calcification is present.   There is calcification of mitral valve leaflets. The leaflet opening is   restricted.   Mean transmitral gradient is 5 mmHg; this finding is consistent with mild   to moderate mitral stenosis.   Moderate (2+) mitral regurgitation is present.   No evidence of pericardial effusion.   No evidence of pleural effusion.   Pulmonic valve not well seen.   Normal appearing right atrium.   A device wire is seen in the RV and RA.   The right ventricle is not well seen, appears grossly normal in size.   The tricuspid valve leaflets are thin and pliable; valve motion is   normal.   Mild (1+) tricuspid valve regurgitation is present.   Moderate pulmonary hypertension.        ________________________________  Review of systems: A 10 point review of system has been performed, and is negative except for what has been mentioned in the above history of present illness.     PAST MEDICAL & SURGICAL HISTORY:  HTN (hypertension)      Paroxysmal atrial fibrillation      Type 2 diabetes mellitus with complication, with long-term current use of insulin      Carotid atherosclerosis  bilateral      AV block, 2nd degree      Carpal tunnel syndrome      HLD (hyperlipidemia)      Sensory neuropathy      Sick sinus syndrome      Vertigo      Arthritis      Spinal stenosis      GIACOMO (obstructive sleep apnea)      H/O: hysterectomy  due to fibroids      History of appendectomy      Artificial cardiac pacemaker  6/ 2009      History of transcatheter aortic valve replacement (TAVR)  4/2019      History of cholecystectomy      PSH: TAVR, PPM placement, hysterectomy, appendectomy     Social Hx: Denies tobacco / etoh / drugs     Family Hx: No pertinent hx     ALLERGIES:  shellfish (Anaphylaxis)  aspirin (Rash)    Home Medications:  atorvastatin 20 mg oral tablet: 1 tab(s) orally once a day (at bedtime) (11 Mar 2024 22:14)  insulin glargine: 15 unit(s) subcutaneous once a day (at bedtime) (11 Mar 2024 22:14)  Januvia 25 mg oral tablet: 1 tab(s) orally once a day (11 Mar 2024 22:17)  Toprol-XL 50 mg oral tablet, extended release: 1 tab(s) orally once a day (at bedtime) (11 Mar 2024 22:15)  Tylenol Extra Strength 500 mg oral tablet: 2 tab(s) orally 3 times a day as needed for back pain (11 Mar 2024 22:17)  warfarin 1 mg oral tablet: 0.5 tab(s) orally once a week on Saturdays ***Pt takes 2mg 6 days a week Sunday through Friday, and 2.5mg on Saturdays*** (11 Mar 2024 22:17)  warfarin 2 mg oral tablet: 1 tab(s) orally once a day (at bedtime) ***Pt takes 2mg 6 days a week Sunday through Friday and 2.5mg on Saturday*** (11 Mar 2024 22:16)    MEDICATIONS  (STANDING):  apixaban 5 milliGRAM(s) Oral two times a day  atorvastatin 40 milliGRAM(s) Oral at bedtime  dextrose 5%. 1000 milliLiter(s) (50 mL/Hr) IV Continuous <Continuous>  dextrose 5%. 1000 milliLiter(s) (100 mL/Hr) IV Continuous <Continuous>  dextrose 50% Injectable 12.5 Gram(s) IV Push once  dextrose 50% Injectable 25 Gram(s) IV Push once  dextrose 50% Injectable 25 Gram(s) IV Push once  diltiazem    Tablet 60 milliGRAM(s) Oral four times a day  glucagon  Injectable 1 milliGRAM(s) IntraMuscular once  insulin glargine Injectable (LANTUS) 15 Unit(s) SubCutaneous at bedtime  insulin lispro (ADMELOG) corrective regimen sliding scale   SubCutaneous three times a day before meals  insulin lispro (ADMELOG) corrective regimen sliding scale   SubCutaneous at bedtime  lidocaine   4% Patch 1 Patch Transdermal daily  melatonin 3 milliGRAM(s) Oral at bedtime  metoprolol succinate ER 50 milliGRAM(s) Oral daily    MEDICATIONS  (PRN):  acetaminophen     Tablet .. 650 milliGRAM(s) Oral every 6 hours PRN Mild Pain (1 - 3)  dextrose Oral Gel 15 Gram(s) Oral once PRN Blood Glucose LESS THAN 70 milliGRAM(s)/deciliter  meclizine 25 milliGRAM(s) Oral every 8 hours PRN Dizziness      Vital Signs Last 24 Hrs  T(C): 36.4 (14 Mar 2024 07:30), Max: 36.5 (13 Mar 2024 20:55)  T(F): 97.6 (14 Mar 2024 07:30), Max: 97.7 (13 Mar 2024 20:55)  HR: 74 (14 Mar 2024 07:30) (69 - 74)  BP: 155/48 (14 Mar 2024 07:30) (149/61 - 162/52)  BP(mean): --  RR: 18 (14 Mar 2024 07:30) (18 - 18)  SpO2: 93% (14 Mar 2024 07:30) (93% - 97%)    Parameters below as of 14 Mar 2024 07:30  Patient On (Oxygen Delivery Method): room air      I&O's Summary    ________________________________  GENERAL APPEARANCE:  No acute distress  HEAD: normocephalic, atraumatic  NECK: supple, no jugular venous distention, no carotid bruit    HEART: Regular rate and rhythm, S1, S2 normal, 1/6 murmur    CHEST:  No anterior chest wall tenderness    LUNGS:  Clear to auscultation, without any wheezing, rhonchi or rales    ABDOMEN: soft, nontender, nondistended, with positive bowel sounds appreciated  EXTREMITIES: no edema.   NEURO: Alert and oriented x3  PSYC:  Normal affect  SKIN:  Dry  ________________________________   TELEMETRY: Sinus rhythm, AV placed    ECG: Sinus rhythm at 65 bpm, nonspecific changes, left axis, LVH    LABS:                                          11.4   13.43 )-----------( 139      ( 13 Mar 2024 06:46 )             35.1          03-13    138  |  108  |  21  ----------------------------<  189<H>  3.7   |  24  |  1.01    Ca    8.4<L>      13 Mar 2024 06:46  Phos  3.3     03-13  Mg     1.7     03-13    TPro  7.0  /  Alb  2.5<L>  /  TBili  1.0  /  DBili  0.3  /  AST  16  /  ALT  11<L>  /  AlkPhos  104  03-13        PT/INR - ( 14 Mar 2024 10:26 )   PT: 18.1 sec;   INR: 1.62 ratio           Urinalysis Basic - ( 13 Mar 2024 06:46 )    Color: x / Appearance: x / SG: x / pH: x  Gluc: 189 mg/dL / Ketone: x  / Bili: x / Urobili: x   Blood: x / Protein: x / Nitrite: x   Leuk Esterase: x / RBC: x / WBC x   Sq Epi: x / Non Sq Epi: x / Bacteria: x        Trg 91  LIVER FUNCTIONS - ( 13 Mar 2024 06:46 )  Alb: 2.5 g/dL / Pro: 7.0 gm/dL / ALK PHOS: 104 U/L / ALT: 11 U/L / AST: 16 U/L / GGT: x         PT/INR - ( 12 Mar 2024 07:27 )   PT: 24.4 sec;   INR: 2.21 ratio         PTT - ( 11 Mar 2024 22:15 )  PTT:35.6 sec  Urinalysis Basic - ( 13 Mar 2024 06:46 )    Color: x / Appearance: x / SG: x / pH: x  Gluc: 189 mg/dL / Ketone: x  / Bili: x / Urobili: x   Blood: x / Protein: x / Nitrite: x   Leuk Esterase: x / RBC: x / WBC x   Sq Epi: x / Non Sq Epi: x / Bacteria: x        PT/INR - ( 12 Mar 2024 07:27 )   PT: 24.4 sec;   INR: 2.21 ratio         PTT - ( 11 Mar 2024 22:15 )  PTT:35.6 sec  Urinalysis Basic - ( 13 Mar 2024 06:46 )    Color: x / Appearance: x / SG: x / pH: x  Gluc: 189 mg/dL / Ketone: x  / Bili: x / Urobili: x   Blood: x / Protein: x / Nitrite: x   Leuk Esterase: x / RBC: x / WBC x   Sq Epi: x / Non Sq Epi: x / Bacteria: x     ________________________________    RADIOLOGY & ADDITIONAL STUDIES: IMPRESSION:  HEAD CT: Mild volume loss, microvascular disease, no acute hemorrhage or   midline shift.      IMPRESSION: No significant hemodynamic stenosis of either internal   carotid artery.  Mild right external carotid artery stenosis.    ECHO 3/12/24     Well seated prosthetic valve in the aortic position. Peak   trans-prosthetic   gradient is within normal limitations for this type of prosthesis.   The left atrium is mildly dilated.   Mild asymmetrical septalleft ventricular hypertrophy is present.   The left ventricle is normal in wall motion, and contractility.   Estimated left ventricular ejection fraction is 55-60 %.   Diastolic dysfunction is present.   The IVC appears normal in size.   Moderate to severe mitral annular calcification is present.   There is calcification of mitral valve leaflets. The leaflet opening is   restricted.   Mean transmitral gradient is 5 mmHg; this finding is consistent with mild   to moderate mitral stenosis.   Moderate (2+) mitral regurgitation is present.   No evidence of pericardial effusion.   No evidence of pleural effusion.   Pulmonic valve not well seen.   Normal appearing right atrium.   A device wire is seen in the RV and RA.   The right ventricle is not well seen, appears grossly normal in size.   The tricuspid valve leaflets are thin and pliable; valve motion is   normal.   Mild (1+) tricuspid valve regurgitation is present.   Moderate pulmonary hypertension.    ________________________________    ASSESSMENT:  Paroxysmal atrial fibrillation on anticoagulation with warfarin presenting with TIA  Severe aortic stenosis status post TAVR in 2019  Dual-chamber pacemaker  HTN  HLD  CKD  Demand ischemia       PLAN:  In summary, this is a 89y Female with a past medical history of AAS status post TAVR, paroxysmal atrial fibrillation, history of TIA, presenting with aphasia consistent with TIA.     Echo reviewed, aortic valve prosthesis in place, normal functioning.    Patient now agreeable to anticoagulation with DOAC.  Will initiate Eliquis.  Unable to take aspirin due to allergy.  Continue statin.  Carotid Doppler showed mild atherosclerotic disease.      ____________________________________________  (Dragon Dictation software used). Thank you for allowing me to participate in the care of your patient. Please contact me should any questions arise.    ERA June DO, [facility]  Office: [phone]

## 2024-03-14 NOTE — PROGRESS NOTE ADULT - ASSESSMENT
89 y/o F w/ PMH of HTN, dyslipidemia, PPM, paroxysmal a-fib (on coumadin), TIAs, DM2, p/w aphasia HEAD CT: Mild volume loss, microvascular disease, no acute hemorrhage or midline shift..    Aphasia suspected secondary to TIA  - symptoms resolved  - PPM checked by MALVIN matthews for MRI  - - CTH: No acute findings   - Neuro checks  - ASA allergy (patient states she develops hives w/ ASA use)   - Statin  - Lipid panel / A1c  - Tele monitoring  - Fall precautions     Essential Hypertension  - increase diltiazem to 60 q6h  - goal 150-170 sbp    Elevated troponin, suspect demand ischemia  -Therapeutic INR on coumadin   -ASA allergy   -Cardio following  -Tele monitoring  -Echo  -EKG Reviewed     Paroxysmal A-fib  -INR therapeutic on coumadin     DM2  -Humalog ISS + Basal insulin   -Diabetic diet     H/o dyslipidemia  -C/w home meds     Mild thrombocytopenia   -F/u outpatient for further management if remains stable     DVT ppx   -Therapeutic INR on coumadin   - dose coumadin tonight

## 2024-03-14 NOTE — PROGRESS NOTE ADULT - SUBJECTIVE AND OBJECTIVE BOX
Peter Estrada MD  [facility] Medicine  Contact via Teams or text/call at [phone]    Patient is a 89y old  Female who presents with a chief complaint of aphasia (13 Mar 2024 17:08)    Feels good.  Wants to go home.  Pending MRI.    Patient seen and examined at bedside. No overnight events reported.     ALLERGIES:  shellfish (Anaphylaxis)  aspirin (Rash)    MEDICATIONS  (STANDING):  atorvastatin 40 milliGRAM(s) Oral at bedtime  dextrose 5%. 1000 milliLiter(s) (50 mL/Hr) IV Continuous <Continuous>  dextrose 5%. 1000 milliLiter(s) (100 mL/Hr) IV Continuous <Continuous>  dextrose 50% Injectable 12.5 Gram(s) IV Push once  dextrose 50% Injectable 25 Gram(s) IV Push once  dextrose 50% Injectable 25 Gram(s) IV Push once  diltiazem    Tablet 60 milliGRAM(s) Oral four times a day  glucagon  Injectable 1 milliGRAM(s) IntraMuscular once  insulin glargine Injectable (LANTUS) 15 Unit(s) SubCutaneous at bedtime  insulin lispro (ADMELOG) corrective regimen sliding scale   SubCutaneous three times a day before meals  insulin lispro (ADMELOG) corrective regimen sliding scale   SubCutaneous at bedtime  lidocaine   4% Patch 1 Patch Transdermal daily  melatonin 3 milliGRAM(s) Oral at bedtime  metoprolol succinate ER 50 milliGRAM(s) Oral daily    MEDICATIONS  (PRN):  acetaminophen     Tablet .. 650 milliGRAM(s) Oral every 6 hours PRN Mild Pain (1 - 3)  dextrose Oral Gel 15 Gram(s) Oral once PRN Blood Glucose LESS THAN 70 milliGRAM(s)/deciliter  meclizine 25 milliGRAM(s) Oral every 8 hours PRN Dizziness    Vital Signs Last 24 Hrs  T(F): 97.6 (14 Mar 2024 07:30), Max: 97.7 (13 Mar 2024 20:55)  HR: 74 (14 Mar 2024 07:30) (69 - 74)  BP: 155/48 (14 Mar 2024 07:30) (149/61 - 162/52)  RR: 18 (14 Mar 2024 07:30) (18 - 18)  SpO2: 93% (14 Mar 2024 07:30) (93% - 97%)  I&O's Summary    PHYSICAL EXAM:  General: NAD, A/O x 3  ENT: No gross hearing impairment, Moist mucous membranes, no thrush  Neck: Supple, No JVD  Lungs: Clear to auscultation bilaterally, good air entry, non-labored breathing  Cardio: RRR, S1/S2, No murmur  Abdomen: Soft, Nontender, Nondistended; Bowel sounds present  Extremities: No calf tenderness, No cyanosis, No pitting edema  Psych: Appropriate mood and affect    LABS:                  11.4   13.43 )-----------( 139      ( 13 Mar 2024 06:46 )             35.1     03-13  138  |  108  |  21  ----------------------------<  189  3.7   |  24  |  1.01    Ca    8.4      13 Mar 2024 06:46  Phos  3.3     03-13  Mg     1.7     03-13    TPro  7.0  /  Alb  2.5  /  TBili  1.0  /  DBili  0.3  /  AST  16  /  ALT  11  /  AlkPhos  104  03-13    PT/INR - ( 12 Mar 2024 07:27 )   PT: 24.4 sec;   INR: 2.21 ratio      PTT - ( 11 Mar 2024 22:15 )  PTT:35.6 sec    CARDIAC MARKERS ( 13 Mar 2024 06:46 )  x     / 204.90 ng/L / x     / x     / x      CARDIAC MARKERS ( 13 Mar 2024 02:27 )  x     / 204.77 ng/L / x     / x     / x      CARDIAC MARKERS ( 12 Mar 2024 00:37 )  x     / 243.15 ng/L / x     / x     / x      CARDIAC MARKERS ( 11 Mar 2024 20:11 )  x     / 248.41 ng/L / x     / x     / x        03-12 Chol 123 mg/dL LDL -- HDL 50 mg/dL Trig 91 mg/dL  TSH 0.60   TSH with FT4 reflex --  Total T3 --    POCT Blood Glucose.: 162 mg/dL (13 Mar 2024 21:45)  POCT Blood Glucose.: 173 mg/dL (13 Mar 2024 19:02)  POCT Blood Glucose.: 202 mg/dL (13 Mar 2024 12:41)      Urinalysis Basic - ( 13 Mar 2024 06:46 )    Color: x / Appearance: x / SG: x / pH: x  Gluc: 189 mg/dL / Ketone: x  / Bili: x / Urobili: x   Blood: x / Protein: x / Nitrite: x   Leuk Esterase: x / RBC: x / WBC x   Sq Epi: x / Non Sq Epi: x / Bacteria: x          RADIOLOGY & ADDITIONAL TESTS:    Care Discussed with Consultants/Other Providers:

## 2024-03-15 NOTE — PROGRESS NOTE ADULT - NS ATTEND AMEND GEN_ALL_CORE FT
89 yr old woman presents with transient diff speaking, returned to baseline. CT/MR head showed no acute changes. ? TIA. Agree with above.

## 2024-03-15 NOTE — PROGRESS NOTE ADULT - SUBJECTIVE AND OBJECTIVE BOX
Peter Estrada MD  [facility] Medicine  Contact via Teams or text/call at [phone]    Patient is a 89y old  Female who presents with a chief complaint of aphasia (15 Mar 2024 13:39)    Feels okay.  Went for MRI.  Still with left knee pain    Patient seen and examined at bedside. No overnight events reported.     ALLERGIES:  shellfish (Anaphylaxis)  aspirin (Rash)    MEDICATIONS  (STANDING):  apixaban 5 milliGRAM(s) Oral two times a day  atorvastatin 40 milliGRAM(s) Oral at bedtime  dextrose 5%. 1000 milliLiter(s) (50 mL/Hr) IV Continuous <Continuous>  dextrose 5%. 1000 milliLiter(s) (100 mL/Hr) IV Continuous <Continuous>  dextrose 50% Injectable 25 Gram(s) IV Push once  dextrose 50% Injectable 12.5 Gram(s) IV Push once  dextrose 50% Injectable 25 Gram(s) IV Push once  diltiazem    milliGRAM(s) Oral daily  glucagon  Injectable 1 milliGRAM(s) IntraMuscular once  insulin glargine Injectable (LANTUS) 15 Unit(s) SubCutaneous at bedtime  insulin lispro (ADMELOG) corrective regimen sliding scale   SubCutaneous three times a day before meals  insulin lispro (ADMELOG) corrective regimen sliding scale   SubCutaneous at bedtime  lidocaine   4% Patch 1 Patch Transdermal daily  melatonin 3 milliGRAM(s) Oral at bedtime  metoprolol succinate ER 50 milliGRAM(s) Oral daily    MEDICATIONS  (PRN):  acetaminophen     Tablet .. 650 milliGRAM(s) Oral every 6 hours PRN Mild Pain (1 - 3)  dextrose Oral Gel 15 Gram(s) Oral once PRN Blood Glucose LESS THAN 70 milliGRAM(s)/deciliter  meclizine 25 milliGRAM(s) Oral every 8 hours PRN Dizziness  metoclopramide Injectable 10 milliGRAM(s) IV Push three times a day PRN nausea  ondansetron Injectable 4 milliGRAM(s) IV Push every 8 hours PRN Nausea and/or Vomiting  oxyCODONE    IR 5 milliGRAM(s) Oral every 6 hours PRN Moderate Pain (4 - 6)    Vital Signs Last 24 Hrs  T(F): 98.4 (15 Mar 2024 12:09), Max: 98.4 (15 Mar 2024 12:09)  HR: 72 (15 Mar 2024 12:09) (72 - 96)  BP: 127/50 (15 Mar 2024 12:09) (127/50 - 158/57)  RR: 18 (15 Mar 2024 12:09) (18 - 19)  SpO2: 95% (15 Mar 2024 12:09) (95% - 100%)  I&O's Summary    PHYSICAL EXAM:  General: NAD, A/O x 3  ENT: No gross hearing impairment, Moist mucous membranes, no thrush  Neck: Supple, No JVD  Lungs: Clear to auscultation bilaterally, good air entry, non-labored breathing  Cardio: RRR, S1/S2, No murmur  Abdomen: Soft, Nontender, Nondistended; Bowel sounds present  Extremities: No calf tenderness, No cyanosis, No pitting edema  Psych: Appropriate mood and affect    LABS:                        11.4   13.43 )-----------( 139      ( 13 Mar 2024 06:46 )             35.1     03-13    138  |  108  |  21  ----------------------------<  189  3.7   |  24  |  1.01    Ca    8.4      13 Mar 2024 06:46  Phos  3.3     03-13  Mg     1.7     03-13    TPro  7.0  /  Alb  2.5  /  TBili  1.0  /  DBili  0.3  /  AST  16  /  ALT  11  /  AlkPhos  104  03-13    PT/INR - ( 14 Mar 2024 10:26 )   PT: 18.1 sec;   INR: 1.62 ratio      CARDIAC MARKERS ( 13 Mar 2024 06:46 )  x     / 204.90 ng/L / x     / x     / x      CARDIAC MARKERS ( 13 Mar 2024 02:27 )  x     / 204.77 ng/L / x     / x     / x          03-12 Chol 123 mg/dL LDL -- HDL 50 mg/dL Trig 91 mg/dL  TSH 0.60   TSH with FT4 reflex --  Total T3 --    POCT Blood Glucose.: 158 mg/dL (15 Mar 2024 12:05)  POCT Blood Glucose.: 138 mg/dL (15 Mar 2024 08:10)  POCT Blood Glucose.: 162 mg/dL (14 Mar 2024 22:20)  POCT Blood Glucose.: 167 mg/dL (14 Mar 2024 18:55)    Urinalysis Basic - ( 13 Mar 2024 06:46 )    Color: x / Appearance: x / SG: x / pH: x  Gluc: 189 mg/dL / Ketone: x  / Bili: x / Urobili: x   Blood: x / Protein: x / Nitrite: x   Leuk Esterase: x / RBC: x / WBC x   Sq Epi: x / Non Sq Epi: x / Bacteria: x    RADIOLOGY & ADDITIONAL TESTS:    Care Discussed with Consultants/Other Providers:

## 2024-03-15 NOTE — CHART NOTE - NSCHARTNOTEFT_GEN_A_CORE
89F w/ L knee OA and effusion s/p L knee aspiration    - WBAT  -  Cell count 16k, Crystals Neg, Gram stain, culture and sensitivity was ordered for the synovial fluid of the affected knee, will follow up  - DVT ppx per primary  - Pain management  - All the patients questions were answered.    - Ortho stable for DC. Ortho to sign off.   - D/w Dr. Warren who agrees w/ plan, will update as needed

## 2024-03-15 NOTE — PATIENT PROFILE ADULT - NUMBER OF YRS
03/15/24                            Kaley Borden  [address] [facility] [id number]    To Whom It May Concern:    This is to certify Kaley Borden was evaluated with Froy Barraza MD on 03/15/24 and can return to school on 03/15/2024.     RESTRICTIONS: none            Electronically signed by:  Froy Barraza MD  [place] [id number]  [address] [place] [facility] [address]  Dept Phone: [phone]   
44

## 2024-03-15 NOTE — PROGRESS NOTE ADULT - SUBJECTIVE AND OBJECTIVE BOX
ROS:     MEDICATIONS  (STANDING):  apixaban 5 milliGRAM(s) Oral two times a day  atorvastatin 40 milliGRAM(s) Oral at bedtime  dextrose 5%. 1000 milliLiter(s) (50 mL/Hr) IV Continuous <Continuous>  dextrose 5%. 1000 milliLiter(s) (100 mL/Hr) IV Continuous <Continuous>  dextrose 50% Injectable 25 Gram(s) IV Push once  dextrose 50% Injectable 12.5 Gram(s) IV Push once  dextrose 50% Injectable 25 Gram(s) IV Push once  diltiazem    Tablet 60 milliGRAM(s) Oral four times a day  glucagon  Injectable 1 milliGRAM(s) IntraMuscular once  insulin glargine Injectable (LANTUS) 15 Unit(s) SubCutaneous at bedtime  insulin lispro (ADMELOG) corrective regimen sliding scale   SubCutaneous three times a day before meals  insulin lispro (ADMELOG) corrective regimen sliding scale   SubCutaneous at bedtime  lidocaine   4% Patch 1 Patch Transdermal daily  melatonin 3 milliGRAM(s) Oral at bedtime  metoprolol succinate ER 50 milliGRAM(s) Oral daily      Vital Signs Last 24 Hrs  T(C): 36.9 (15 Mar 2024 12:09), Max: 36.9 (15 Mar 2024 12:09)  T(F): 98.4 (15 Mar 2024 12:09), Max: 98.4 (15 Mar 2024 12:09)  HR: 72 (15 Mar 2024 12:09) (72 - 96)  BP: 127/50 (15 Mar 2024 12:09) (127/50 - 158/57)  BP(mean): 74 (15 Mar 2024 01:13) (74 - 74)  RR: 18 (15 Mar 2024 12:09) (18 - 19)  SpO2: 95% (15 Mar 2024 12:09) (95% - 100%)    Parameters below as of 15 Mar 2024 12:09  Patient On (Oxygen Delivery Method): nasal cannula  O2 Flow (L/min): 2        03-12 Chol 123 LDL -- HDL 50<L> Trig 91    Radiology report:    < from: MR Head No Cont (03.15.24 @ 11:12) >  IMPRESSION:    MRI BRAIN  1. No evidence of acute infarct, hemorrhage or mass effect.  2. Additional findings described in detail above.    MRA NECK  1. Degraded bymotion artifact.  2. Vertebral arteries patent and codominant.  3. Evidence of up to approximately 50% (mild-moderate) stenosis bilateral   proximal internal carotid arteries with an aberrant medial course. No   evidence of severe stenosis, occlusionor dissection bilateral   extracranial carotid arteries.    MRA BRAIN  1. No large vessel occlusion, proximal significant stenosis, definitive   aneurysm or vascular malformation.  2. Additional findings described in detail above.        < from: US Duplex Carotid Arteries Complete, Bilateral (03.13.24 @ 18:29) >    IMPRESSION: No significant hemodynamic stenosis of either internal   carotid artery.  Mild right external carotid artery stenosis.    < from: CT Brain Stroke Protocol (03.11.24 @ 19:36) >  IMPRESSION:  HEAD CT: Mild volume loss, microvascular disease, no acute hemorrhage or   midline shift.    < from: TTE Echo Complete w/o Contrast w/ Doppler (03.12.24 @ 14:37) >   Impression     Summary     Well seated prosthetic valve in the aortic position. Peak   trans-prosthetic   gradient is within normal limitations for this type of prosthesis.   The left atrium is mildly dilated.   Mild asymmetrical septalleft ventricular hypertrophy is present.   The left ventricle is normal in wall motion, and contractility.   Estimated left ventricular ejection fraction is 55-60 %.   Diastolic dysfunction is present.   The IVC appears normal in size.   Moderate to severe mitral annular calcification is present.   There is calcification of mitral valve leaflets. The leaflet opening is   restricted.   Mean transmitral gradient is 5 mmHg; this finding is consistent with mild   to moderate mitral stenosis.   Moderate (2+) mitral regurgitation is present.   No evidence of pericardial effusion.   No evidence of pleural effusion.   Pulmonic valve not well seen.   Normal appearing right atrium.   A device wire is seen in the RV and RA.   The right ventricle is not well seen, appears grossly normal in size.   The tricuspid valve leaflets are thin and pliable; valve motion is   normal.   Mild (1+) tricuspid valve regurgitation is present.   Moderate pulmonary hypertension. No acute events overnight,  no new complaints    ROS: As stated above, otherwise neg    MEDICATIONS  (STANDING):  apixaban 5 milliGRAM(s) Oral two times a day  atorvastatin 40 milliGRAM(s) Oral at bedtime  dextrose 5%. 1000 milliLiter(s) (50 mL/Hr) IV Continuous <Continuous>  dextrose 5%. 1000 milliLiter(s) (100 mL/Hr) IV Continuous <Continuous>  dextrose 50% Injectable 25 Gram(s) IV Push once  dextrose 50% Injectable 12.5 Gram(s) IV Push once  dextrose 50% Injectable 25 Gram(s) IV Push once  diltiazem    Tablet 60 milliGRAM(s) Oral four times a day  glucagon  Injectable 1 milliGRAM(s) IntraMuscular once  insulin glargine Injectable (LANTUS) 15 Unit(s) SubCutaneous at bedtime  insulin lispro (ADMELOG) corrective regimen sliding scale   SubCutaneous three times a day before meals  insulin lispro (ADMELOG) corrective regimen sliding scale   SubCutaneous at bedtime  lidocaine   4% Patch 1 Patch Transdermal daily  melatonin 3 milliGRAM(s) Oral at bedtime  metoprolol succinate ER 50 milliGRAM(s) Oral daily      Vital Signs Last 24 Hrs  T(C): 36.9 (15 Mar 2024 12:09), Max: 36.9 (15 Mar 2024 12:09)  T(F): 98.4 (15 Mar 2024 12:09), Max: 98.4 (15 Mar 2024 12:09)  HR: 72 (15 Mar 2024 12:09) (72 - 96)  BP: 127/50 (15 Mar 2024 12:09) (127/50 - 158/57)  BP(mean): 74 (15 Mar 2024 01:13) (74 - 74)  RR: 18 (15 Mar 2024 12:09) (18 - 19)  SpO2: 95% (15 Mar 2024 12:09) (95% - 100%)    Parameters below as of 15 Mar 2024 12:09  Patient On (Oxygen Delivery Method): nasal cannula  O2 Flow (L/min): 2    Neurological exam:  HF: A x O x 3. Appropriately interactive, normal affect. Speech fluent, No Aphasia or paraphasic errors. Naming /repetition intact   CN: TEN, EOMI, VFF, facial sensation normal, no NLFD, tongue midline, Palate moves equally, SCM equal bilaterally  Motor: No pronator drift, Strength 5/5 in all 4 ext  Sens: Intact to light touch   Reflexes: Symmetric and normal, downgoing toes b/l  Coord:  No FNFA, unable to do HTS 2/2 LE arthritic pain  Gait/Balance: Cannot test    NIHSS: 0    03-12 Chol 123 LDL -- HDL 50<L> Trig 91    Radiology report:    < from: MR Head No Cont (03.15.24 @ 11:12) >  IMPRESSION:    MRI BRAIN  1. No evidence of acute infarct, hemorrhage or mass effect.  2. Additional findings described in detail above.    MRA NECK  1. Degraded bymotion artifact.  2. Vertebral arteries patent and codominant.  3. Evidence of up to approximately 50% (mild-moderate) stenosis bilateral   proximal internal carotid arteries with an aberrant medial course. No   evidence of severe stenosis, occlusionor dissection bilateral   extracranial carotid arteries.    MRA BRAIN  1. No large vessel occlusion, proximal significant stenosis, definitive   aneurysm or vascular malformation.  2. Additional findings described in detail above.        < from: US Duplex Carotid Arteries Complete, Bilateral (03.13.24 @ 18:29) >    IMPRESSION: No significant hemodynamic stenosis of either internal   carotid artery.  Mild right external carotid artery stenosis.    < from: CT Brain Stroke Protocol (03.11.24 @ 19:36) >  IMPRESSION:  HEAD CT: Mild volume loss, microvascular disease, no acute hemorrhage or   midline shift.    < from: TTE Echo Complete w/o Contrast w/ Doppler (03.12.24 @ 14:37) >   Impression     Summary     Well seated prosthetic valve in the aortic position. Peak   trans-prosthetic   gradient is within normal limitations for this type of prosthesis.   The left atrium is mildly dilated.   Mild asymmetrical septalleft ventricular hypertrophy is present.   The left ventricle is normal in wall motion, and contractility.   Estimated left ventricular ejection fraction is 55-60 %.   Diastolic dysfunction is present.   The IVC appears normal in size.   Moderate to severe mitral annular calcification is present.   There is calcification of mitral valve leaflets. The leaflet opening is   restricted.   Mean transmitral gradient is 5 mmHg; this finding is consistent with mild   to moderate mitral stenosis.   Moderate (2+) mitral regurgitation is present.   No evidence of pericardial effusion.   No evidence of pleural effusion.   Pulmonic valve not well seen.   Normal appearing right atrium.   A device wire is seen in the RV and RA.   The right ventricle is not well seen, appears grossly normal in size.   The tricuspid valve leaflets are thin and pliable; valve motion is   normal.   Mild (1+) tricuspid valve regurgitation is present.   Moderate pulmonary hypertension.

## 2024-03-15 NOTE — CONSULT NOTE ADULT - SUBJECTIVE AND OBJECTIVE BOX
ORTHOPEDIC CONSULT:    HPI:  89 y/o F w/ hx of gout and on Coumadin admitted w/ TIA in 3/11 as well as inability to ambulate. Presenting w/ L knee pain and swelling. Denies recent falls/trauma. Pain localized diffusely through knee and limiting ROM. No pain elsewhere. No fevers/chills. No numbness/tingling. Ambulates w/ walker at baseline. Last ambulated prior to admit.      PSH: TAVR, PPM placement, hysterectomy, appendectomy     Social Hx: Denies tobacco / etoh / drugs     Family Hx: No pertinent hx    (11 Mar 2024 23:55)      shellfish (Anaphylaxis)  aspirin (Rash)      PAST MEDICAL & SURGICAL HISTORY:  HTN (hypertension)      Paroxysmal atrial fibrillation      Type 2 diabetes mellitus with complication, with long-term current use of insulin      Carotid atherosclerosis  bilateral      AV block, 2nd degree      Carpal tunnel syndrome      HLD (hyperlipidemia)      Sensory neuropathy      Sick sinus syndrome      Vertigo      Arthritis      Spinal stenosis      GIACOMO (obstructive sleep apnea)      H/O: hysterectomy  due to fibroids      History of appendectomy      Artificial cardiac pacemaker  6/ 2009      History of transcatheter aortic valve replacement (TAVR)  4/2019      History of cholecystectomy          Vital Signs Last 24 Hrs  T(C): 36.9 (15 Mar 2024 12:09), Max: 36.9 (15 Mar 2024 12:09)  T(F): 98.4 (15 Mar 2024 12:09), Max: 98.4 (15 Mar 2024 12:09)  HR: 93 (15 Mar 2024 18:20) (72 - 96)  BP: 121/51 (15 Mar 2024 18:20) (121/51 - 158/57)  BP(mean): 74 (15 Mar 2024 01:13) (74 - 74)  RR: 18 (15 Mar 2024 12:09) (18 - 19)  SpO2: 95% (15 Mar 2024 12:09) (95% - 100%)    Parameters below as of 15 Mar 2024 12:09  Patient On (Oxygen Delivery Method): nasal cannula  O2 Flow (L/min): 2      PHYSICAL EXAM:  Gen: NAD, awake and alert  Left Knee: Skin intact. No Erythema.   Knee is stable with no valgus/varus instability.   No pain w/ micromotion  Can actively range knee w/ some pain  PROM 0-90 w/ terminal pain  +effusion noted. Non-tender over the joint line.   Fires HF/KE/Gsc/TA/EHL/FHL  SILT throughout  + DP/PT  No calf tenderness b/l    RADIOLOGY:    XR/CT L Knee: Effusion noted. Advanced degenerative changes. No acute obvious fx.    IMPRESSION: Pt is 89y with L Knee Effusion.     PROCEDURE:  -  Patient was identified, site of procedure was marked and time-out performed with nurse.   -  L knee arthrocentesis at bedside was recommended and consent was obtained from the patient.   -  The affected knee was sterilely prepped with chlorhexidine x 2 and an arthrocentesis was performed with the usual sterile technique. The knee was aspirated with a 50cc syringe and 18gauge needle through the lateral aspect of the knee. 45cc of yellow (turned to aparna), synovial fluid was obtained.  The knee was sterilely wrapped with 4x4 gauze and ACE wrap bandage.  Procedure was performed with no acute events/complications. Pt tolerated procedure very well.  Pt is stable.     ASSESSMENT/PLAN:  89F w/ L knee effusion. Low c/f septic arthritis, likely gout vs hemarthrosis. Will r/o w/ aspiration results.  - WBAT  -  Cell count, Crystals, Gram stain, culture and sensitivity was ordered for the synovial fluid of the affected knee, will follow up  - DVT ppx with venodynes recommended  - Remain NPO except meds in case OR tonight pending results  -  Pain management  -  All the patients questions were answered.    - D/w Dr. Warren who agrees w/ plan, will update as needed

## 2024-03-15 NOTE — PROGRESS NOTE ADULT - SUBJECTIVE AND OBJECTIVE BOX
The patient was seen and examined.  Tolerating Eliquis.  However having nausea and diarrhea now.  C. difficile negative.    Initial Consult HPI    ________________________________  WMarquita RUBIO is a 89y year old Female with a past medical history of severe stenosis status post TAVR in 2019, paroxysmal atrial fibrillation on anticoagulation with warfarin, history of TIAs in the past, hypertension, hyperlipidemia, dual-chamber pacemaker, who presented for evaluation of aphasia and diagnosed with TIA.  Patient was eating dinner and had abnormal speech.  Symptoms resolved spontaneously.  No chest discomfort.  No palpitations.  No shortness of breath.  CT showed no acute abnormalities.  No CTA done due to contrast allergy.  Echo showed preserved LVEF.  Sinus with AV paced on telemetry.  Device interrogation showed no significant arrhythmias.      Paroxysmal atrial fibrillation on DOACHPI:  89 y/o F w/ PMH of HTN, dyslipidemia, PPM, paroxysmal a-fib (on coumadin), TIAs, DM2, p/w aphasia. Patient states she finished her dinner and was feeling like her normal self, and then around 6pm her speech became abnormal when speaking to her . States her speech was coming out garbled, and this lasted for 10 minutes before resolving spontaneously. States she was able to walk herself to the bathroom and back after the episode. Denies any facial droop, weakness in arms / legs, sensory deficits, dysphadia, CP, SOB, nausea, vomiting, abdominal pain       PREVIOUS CARDIAC WORKUP:    Echocardiogram 3/12/24   Well seated prosthetic valve in the aortic position. Peak   trans-prosthetic   gradient is within normal limitations for this type of prosthesis.   The left atrium is mildly dilated.   Mild asymmetrical septalleft ventricular hypertrophy is present.   The left ventricle is normal in wall motion, and contractility.   Estimated left ventricular ejection fraction is 55-60 %.   Diastolic dysfunction is present.   The IVC appears normal in size.   Moderate to severe mitral annular calcification is present.   There is calcification of mitral valve leaflets. The leaflet opening is   restricted.   Mean transmitral gradient is 5 mmHg; this finding is consistent with mild   to moderate mitral stenosis.   Moderate (2+) mitral regurgitation is present.   No evidence of pericardial effusion.   No evidence of pleural effusion.   Pulmonic valve not well seen.   Normal appearing right atrium.   A device wire is seen in the RV and RA.   The right ventricle is not well seen, appears grossly normal in size.   The tricuspid valve leaflets are thin and pliable; valve motion is   normal.   Mild (1+) tricuspid valve regurgitation is present.   Moderate pulmonary hypertension.        ________________________________  Review of systems: A 10 point review of system has been performed, and is negative except for what has been mentioned in the above history of present illness.     PAST MEDICAL & SURGICAL HISTORY:  HTN (hypertension)      Paroxysmal atrial fibrillation      Type 2 diabetes mellitus with complication, with long-term current use of insulin      Carotid atherosclerosis  bilateral      AV block, 2nd degree      Carpal tunnel syndrome      HLD (hyperlipidemia)      Sensory neuropathy      Sick sinus syndrome      Vertigo      Arthritis      Spinal stenosis      GIACOMO (obstructive sleep apnea)      H/O: hysterectomy  due to fibroids      History of appendectomy      Artificial cardiac pacemaker  6/ 2009      History of transcatheter aortic valve replacement (TAVR)  4/2019      History of cholecystectomy      PSH: TAVR, PPM placement, hysterectomy, appendectomy     Social Hx: Denies tobacco / etoh / drugs     Family Hx: No pertinent hx     ALLERGIES:  shellfish (Anaphylaxis)  aspirin (Rash)    Home Medications:  atorvastatin 20 mg oral tablet: 1 tab(s) orally once a day (at bedtime) (11 Mar 2024 22:14)  insulin glargine: 15 unit(s) subcutaneous once a day (at bedtime) (11 Mar 2024 22:14)  Januvia 25 mg oral tablet: 1 tab(s) orally once a day (11 Mar 2024 22:17)  Toprol-XL 50 mg oral tablet, extended release: 1 tab(s) orally once a day (at bedtime) (11 Mar 2024 22:15)  Tylenol Extra Strength 500 mg oral tablet: 2 tab(s) orally 3 times a day as needed for back pain (11 Mar 2024 22:17)  warfarin 1 mg oral tablet: 0.5 tab(s) orally once a week on Saturdays ***Pt takes 2mg 6 days a week Sunday through Friday, and 2.5mg on Saturdays*** (11 Mar 2024 22:17)  warfarin 2 mg oral tablet: 1 tab(s) orally once a day (at bedtime) ***Pt takes 2mg 6 days a week Sunday through Friday and 2.5mg on Saturday*** (11 Mar 2024 22:16)    MEDICATIONS  (STANDING):  apixaban 5 milliGRAM(s) Oral two times a day  atorvastatin 40 milliGRAM(s) Oral at bedtime  dextrose 5%. 1000 milliLiter(s) (50 mL/Hr) IV Continuous <Continuous>  dextrose 5%. 1000 milliLiter(s) (100 mL/Hr) IV Continuous <Continuous>  dextrose 50% Injectable 25 Gram(s) IV Push once  dextrose 50% Injectable 12.5 Gram(s) IV Push once  dextrose 50% Injectable 25 Gram(s) IV Push once  diltiazem    Tablet 60 milliGRAM(s) Oral four times a day  glucagon  Injectable 1 milliGRAM(s) IntraMuscular once  insulin glargine Injectable (LANTUS) 15 Unit(s) SubCutaneous at bedtime  insulin lispro (ADMELOG) corrective regimen sliding scale   SubCutaneous three times a day before meals  insulin lispro (ADMELOG) corrective regimen sliding scale   SubCutaneous at bedtime  lidocaine   4% Patch 1 Patch Transdermal daily  melatonin 3 milliGRAM(s) Oral at bedtime  metoprolol succinate ER 50 milliGRAM(s) Oral daily    MEDICATIONS  (PRN):  acetaminophen     Tablet .. 650 milliGRAM(s) Oral every 6 hours PRN Mild Pain (1 - 3)  dextrose Oral Gel 15 Gram(s) Oral once PRN Blood Glucose LESS THAN 70 milliGRAM(s)/deciliter  meclizine 25 milliGRAM(s) Oral every 8 hours PRN Dizziness  metoclopramide Injectable 10 milliGRAM(s) IV Push three times a day PRN nausea  ondansetron Injectable 4 milliGRAM(s) IV Push every 8 hours PRN Nausea and/or Vomiting  oxyCODONE    IR 5 milliGRAM(s) Oral every 6 hours PRN Moderate Pain (4 - 6)      Vital Signs Last 24 Hrs  T(C): 36.9 (15 Mar 2024 12:09), Max: 36.9 (15 Mar 2024 12:09)  T(F): 98.4 (15 Mar 2024 12:09), Max: 98.4 (15 Mar 2024 12:09)  HR: 72 (15 Mar 2024 12:09) (72 - 96)  BP: 127/50 (15 Mar 2024 12:09) (127/50 - 158/57)  BP(mean): 74 (15 Mar 2024 01:13) (74 - 74)  RR: 18 (15 Mar 2024 12:09) (18 - 19)  SpO2: 95% (15 Mar 2024 12:09) (95% - 100%)    Parameters below as of 15 Mar 2024 12:09  Patient On (Oxygen Delivery Method): nasal cannula  O2 Flow (L/min): 2    I&O's Summary    ________________________________  GENERAL APPEARANCE:  No acute distress  HEAD: normocephalic, atraumatic  NECK: supple, no jugular venous distention, no carotid bruit    HEART: Regular rate and rhythm, S1, S2 normal, 1/6 murmur    CHEST:  No anterior chest wall tenderness    LUNGS:  Clear to auscultation, without any wheezing, rhonchi or rales    ABDOMEN: soft, nontender, nondistended, with positive bowel sounds appreciated  EXTREMITIES: no edema.   NEURO: Alert and oriented x3  PSYC:  Normal affect  SKIN:  Dry  ________________________________   TELEMETRY: Sinus rhythm, AV placed    ECG: Sinus rhythm at 65 bpm, nonspecific changes, left axis, LVH    LABS:                                         PT/INR - ( 14 Mar 2024 10:26 )   PT: 18.1 sec;   INR: 1.62 ratio               Trg 91  LIVER FUNCTIONS - ( 13 Mar 2024 06:46 )  Alb: 2.5 g/dL / Pro: 7.0 gm/dL / ALK PHOS: 104 U/L / ALT: 11 U/L / AST: 16 U/L / GGT: x         PT/INR - ( 12 Mar 2024 07:27 )   PT: 24.4 sec;   INR: 2.21 ratio         PTT - ( 11 Mar 2024 22:15 )  PTT:35.6 sec  Urinalysis Basic - ( 13 Mar 2024 06:46 )    Color: x / Appearance: x / SG: x / pH: x  Gluc: 189 mg/dL / Ketone: x  / Bili: x / Urobili: x   Blood: x / Protein: x / Nitrite: x   Leuk Esterase: x / RBC: x / WBC x   Sq Epi: x / Non Sq Epi: x / Bacteria: x        PT/INR - ( 12 Mar 2024 07:27 )   PT: 24.4 sec;   INR: 2.21 ratio         PTT - ( 11 Mar 2024 22:15 )  PTT:35.6 sec  Urinalysis Basic - ( 13 Mar 2024 06:46 )    Color: x / Appearance: x / SG: x / pH: x  Gluc: 189 mg/dL / Ketone: x  / Bili: x / Urobili: x   Blood: x / Protein: x / Nitrite: x   Leuk Esterase: x / RBC: x / WBC x   Sq Epi: x / Non Sq Epi: x / Bacteria: x     ________________________________    RADIOLOGY & ADDITIONAL STUDIES: IMPRESSION:  HEAD CT: Mild volume loss, microvascular disease, no acute hemorrhage or   midline shift.      IMPRESSION: No significant hemodynamic stenosis of either internal   carotid artery.  Mild right external carotid artery stenosis.    ECHO 3/12/24     Well seated prosthetic valve in the aortic position. Peak   trans-prosthetic   gradient is within normal limitations for this type of prosthesis.   The left atrium is mildly dilated.   Mild asymmetrical septalleft ventricular hypertrophy is present.   The left ventricle is normal in wall motion, and contractility.   Estimated left ventricular ejection fraction is 55-60 %.   Diastolic dysfunction is present.   The IVC appears normal in size.   Moderate to severe mitral annular calcification is present.   There is calcification of mitral valve leaflets. The leaflet opening is   restricted.   Mean transmitral gradient is 5 mmHg; this finding is consistent with mild   to moderate mitral stenosis.   Moderate (2+) mitral regurgitation is present.   No evidence of pericardial effusion.   No evidence of pleural effusion.   Pulmonic valve not well seen.   Normal appearing right atrium.   A device wire is seen in the RV and RA.   The right ventricle is not well seen, appears grossly normal in size.   The tricuspid valve leaflets are thin and pliable; valve motion is   normal.   Mild (1+) tricuspid valve regurgitation is present.   Moderate pulmonary hypertension.    Echo 3/12/2024   Well seated prosthetic valve in the aortic position. Peak   trans-prosthetic   gradient is within normal limitations for this type of prosthesis.   The left atrium is mildly dilated.   Mild asymmetrical septalleft ventricular hypertrophy is present.   The left ventricle is normal in wall motion, and contractility.   Estimated left ventricular ejection fraction is 55-60 %.   Diastolic dysfunction is present.   The IVC appears normal in size.   Moderate to severe mitral annular calcification is present.   There is calcification of mitral valve leaflets. The leaflet opening is   restricted.   Mean transmitral gradient is 5 mmHg; this finding is consistent with mild   to moderate mitral stenosis.   Moderate (2+) mitral regurgitation is present.   No evidence of pericardial effusion.   No evidence of pleural effusion.   Pulmonic valve not well seen.   Normal appearing right atrium.   A device wire is seen in the RV and RA.   The right ventricle is not well seen, appears grossly normal in size.   The tricuspid valve leaflets are thin and pliable; valve motion is   normal.   Mild (1+) tricuspid valve regurgitation is present.   Moderate pulmonary hypertension.      ________________________________    ASSESSMENT:  Paroxysmal atrial fibrillation on anticoagulation with warfarin presenting with TIA  Severe aortic stenosis status post TAVR in 2019  Dual-chamber pacemaker  HTN  HLD  CKD  Demand ischemia       PLAN:  In summary, this is a 89y Female with a past medical history of AAS status post TAVR, paroxysmal atrial fibrillation, history of TIA, presenting with aphasia consistent with TIA.     Echo reviewed, aortic valve prosthesis in place, normal functioning.  Continue anticoagulation with Eliquis for history of paroxysmal atrial fibrillation with TIA despite anticoagulation with warfarin.  Discussed with neurology, carotid Doppler showed nonobstructive disease and patient at increased risk of bleeding so will not start antiplatelet therapy with clopidogrel (allergic to aspirin).  Continue statin.  Blood pressure stable.  Continue beta-blocker.  Switch to long-acting diltiazem.      ____________________________________________  (Dragon Dictation software used). Thank you for allowing me to participate in the care of your patient. Please contact me should any questions arise.    ERA June DO, [facility]  Office: [phone]

## 2024-03-15 NOTE — PROGRESS NOTE ADULT - ASSESSMENT
89 y/o F w/ PMH of HTN, dyslipidemia, PPM, paroxysmal a-fib (on coumadin-last INR here 1.98), TIA 2020 presenting with expressive aphasia, DM2, p/w aphasia.   Patient states she finished her dinner and was feeling like her normal self, and then around 6pm yesterday she was not able to get words out when speaking to her . This lasted 10 minutes and then self resolved.  There was not facial droop, focal weakness/numbness, diplopia, unsteady gait, dizziness.  States she was able to walk herself to the bathroom and back after the episode.  In the ED code stroke was called.  NIHSS 0, initial /51.  CTH was neg for acute bleed, ischemia.  CTA/P not done due to contrast allergy. She was not a thrombolytics candidate because she did not have any focal disabling deficits, and her INR was 1.98 and elevated PT of 21.9.  She was not a thrombectomy candidate as her NIHSS was 0.        #TIA likely    Recommendations  -F/U MRI/A H&N  -2D Echo  -monitor on tele  -Consider switching coumadin to Eliquis-cardiology following  -LDL is 56 so will continue Atorvastatin 40mg QD.  A1c 7.5  -PT eval  -Neurochecks/VS q 4, FS q 6 hours x 24 hours    D/W Dr. Nesbitt 89 y/o F w/ PMH of HTN, dyslipidemia, PPM, paroxysmal a-fib (on coumadin-last INR here 1.98), TIA 2020 presenting with expressive aphasia, DM2, p/w aphasia.   Patient states she finished her dinner and was feeling like her normal self, and then around 6pm yesterday she was not able to get words out when speaking to her . This lasted 10 minutes and then self resolved.  There was not facial droop, focal weakness/numbness, diplopia, unsteady gait, dizziness.  States she was able to walk herself to the bathroom and back after the episode.  In the ED code stroke was called.  NIHSS 0, initial /51.  CTH was neg for acute bleed, ischemia.  CTA/P not done due to contrast allergy. She was not a thrombolytics candidate because she did not have any focal disabling deficits, and her INR was 1.98 and elevated PT of 21.9.  She was not a thrombectomy candidate as her NIHSS was 0.        #TIA likely-MRIA H&N neg for acute stroke.  Mild-Mod b/l ICA stenosis    Recommendations  -Agree with Cardiology to switch from coumadin to Eliquis  -LDL is 56 so will continue Atorvastatin 40mg QD.  A1c 7.5  -PT eval  -Neurochecks/VS q 4  -outpatient Neurlogy follow up   -will sign off    D/W Dr. Nesbitt, Dr. Estrada

## 2024-03-15 NOTE — PROGRESS NOTE ADULT - ASSESSMENT
89 y/o F w/ PMH of HTN, dyslipidemia, PPM, paroxysmal a-fib (on coumadin), TIAs, DM2, p/w aphasia HEAD CT: Mild volume loss, microvascular disease, no acute hemorrhage or midline shift..    Aphasia suspected secondary to TIA  - symptoms resolved  - MRI negative for stroke, suspected TIA  - - CTH: No acute findings   - Neuro checks  - ASA allergy (patient states she develops hives w/ ASA use)   - Statin  - Tele monitoring  - Fall precautions     Left Knee Pain  - Ct showing moderate effusion  - ortho consulted for possible tap  - pain control      Essential Hypertension  - increase diltiazem to 60 q6h  - goal 150-170 sbp    Elevated troponin, suspect demand ischemia  - switched to eliquis  -ASA allergy   -Cardio following  -Tele monitoring  -Echo  -EKG Reviewed     Paroxysmal A-fib  - eliquis    DM2  -Humalog ISS + Basal insulin   -Diabetic diet     H/o dyslipidemia  -C/w home meds     Mild thrombocytopenia   -F/u outpatient for further management if remains stable     DVT ppx   - eliquis

## 2024-03-16 NOTE — PROGRESS NOTE ADULT - ASSESSMENT
89 y/o F w/ PMH of HTN, dyslipidemia, PPM, paroxysmal a-fib (on coumadin), TIAs, DM2, p/w aphasia HEAD CT: Mild volume loss, microvascular disease, no acute hemorrhage or midline shift..    Aphasia suspected secondary to TIA  - symptoms resolved  - MRI negative for stroke, suspected TIA  - - CTH: No acute findings   - Neuro checks  - ASA allergy (patient states she develops hives w/ ASA use)   - Statin  - Tele monitoring  - Fall precautions     Left Knee Pain  - Ct showing moderate effusion  - ortho consulted  S/P Knee aspiration   - pain control      Essential Hypertension  - Continue diltiazem to 60 q6h  - goal 150-170 sbp    Elevated troponin, suspect demand ischemia  - switched to eliquis  -ASA allergy   -Cardio following  -Tele monitoring  -Echo  Estimated left ventricular ejection fraction is 55-60 %.  -EKG Reviewed     Paroxysmal A-fib  - eliquis    DM2  -Humalog ISS + Basal insulin   -Diabetic diet     H/o dyslipidemia  -C/w home meds     Mild thrombocytopenia   -F/u outpatient for further management if remains stable     DVT ppx   - eliquis

## 2024-03-16 NOTE — PROGRESS NOTE ADULT - SUBJECTIVE AND OBJECTIVE BOX
HOSPITALIST ATTENDING PROGRESS NOTE    Chart and meds reviewed.      Subjective: Patient seen and examined. resting comfortably. Continues to feel weak. MRI negative. Pending placement. possible dc to [facility] tomorrow       Additional results/Imaging, I have personally reviewed:    LABS:                                All other systems reviewed and found to be negative with the exception of what has been described above.    MEDICATIONS  (STANDING):  apixaban 5 milliGRAM(s) Oral two times a day  atorvastatin 40 milliGRAM(s) Oral at bedtime  dextrose 5%. 1000 milliLiter(s) (50 mL/Hr) IV Continuous <Continuous>  dextrose 5%. 1000 milliLiter(s) (100 mL/Hr) IV Continuous <Continuous>  dextrose 50% Injectable 25 Gram(s) IV Push once  dextrose 50% Injectable 12.5 Gram(s) IV Push once  dextrose 50% Injectable 25 Gram(s) IV Push once  diltiazem    milliGRAM(s) Oral daily  glucagon  Injectable 1 milliGRAM(s) IntraMuscular once  insulin glargine Injectable (LANTUS) 15 Unit(s) SubCutaneous at bedtime  insulin lispro (ADMELOG) corrective regimen sliding scale   SubCutaneous three times a day before meals  insulin lispro (ADMELOG) corrective regimen sliding scale   SubCutaneous at bedtime  lidocaine   4% Patch 1 Patch Transdermal daily  melatonin 3 milliGRAM(s) Oral at bedtime  metoprolol succinate ER 50 milliGRAM(s) Oral daily    MEDICATIONS  (PRN):  acetaminophen     Tablet .. 650 milliGRAM(s) Oral every 6 hours PRN Mild Pain (1 - 3)  dextrose Oral Gel 15 Gram(s) Oral once PRN Blood Glucose LESS THAN 70 milliGRAM(s)/deciliter  meclizine 25 milliGRAM(s) Oral every 8 hours PRN Dizziness  metoclopramide Injectable 10 milliGRAM(s) IV Push three times a day PRN nausea  ondansetron Injectable 4 milliGRAM(s) IV Push every 8 hours PRN Nausea and/or Vomiting  oxyCODONE    IR 5 milliGRAM(s) Oral every 6 hours PRN Moderate Pain (4 - 6)      VITALS:  T(F): 97.5 (03-16-24 @ 08:20), Max: 97.6 (03-15-24 @ 20:44)  HR: 83 (03-16-24 @ 08:20) (78 - 93)  BP: 134/49 (03-16-24 @ 08:20) (114/71 - 134/49)  RR: 18 (03-16-24 @ 08:20) (18 - 18)  SpO2: 98% (03-15-24 @ 20:44) (98% - 98%)  Wt(kg): --    I&O's Summary      CAPILLARY BLOOD GLUCOSE      POCT Blood Glucose.: 149 mg/dL (16 Mar 2024 12:08)  POCT Blood Glucose.: 97 mg/dL (16 Mar 2024 07:52)  POCT Blood Glucose.: 153 mg/dL (15 Mar 2024 21:48)  POCT Blood Glucose.: 156 mg/dL (15 Mar 2024 17:52)      PHYSICAL EXAM:  General: NAD, A/O x 3  ENT: No gross hearing impairment, Moist mucous membranes, no thrush  Neck: Supple, No JVD  Lungs: Clear to auscultation bilaterally, good air entry, non-labored breathing  Cardio: RRR, S1/S2, No murmur  Abdomen: Soft, Nontender, Nondistended; Bowel sounds present  Extremities: No calf tenderness, No cyanosis, No pitting edema  Psych: Appropriate mood and affect      CULTURES:      Telemetry, personally reviewed

## 2024-03-17 NOTE — CONSULT NOTE ADULT - SUBJECTIVE AND OBJECTIVE BOX
HPI:    89y Female with a past medical history of HTN, HLD  AAS status post TAVR, paroxysmal atrial fibrillation, history of TIA, on current admission with aphasia consistent with TIA.  Gen surgery consulted for evaluation for SBO. Patient report passage of loose stool yesterday, an episode of bilious vomiting and passing gas. She feels bloated, continue to have nausea but no vomiting today.     PAST MEDICAL & SURGICAL HISTORY:  HTN (hypertension)      Paroxysmal atrial fibrillation      Type 2 diabetes mellitus with complication, with long-term current use of insulin      Carotid atherosclerosis  bilateral      AV block, 2nd degree      Carpal tunnel syndrome      HLD (hyperlipidemia)      Sensory neuropathy      Sick sinus syndrome      Vertigo      Arthritis      Spinal stenosis      GIACOMO (obstructive sleep apnea)      H/O: hysterectomy  due to fibroids      History of appendectomy      Artificial cardiac pacemaker  6/ 2009      History of transcatheter aortic valve replacement (TAVR)  4/2019      History of cholecystectomy          MEDICATIONS  (STANDING):  apixaban 5 milliGRAM(s) Oral two times a day  atorvastatin 40 milliGRAM(s) Oral at bedtime  dextrose 5%. 1000 milliLiter(s) (50 mL/Hr) IV Continuous <Continuous>  dextrose 5%. 1000 milliLiter(s) (100 mL/Hr) IV Continuous <Continuous>  dextrose 50% Injectable 12.5 Gram(s) IV Push once  dextrose 50% Injectable 25 Gram(s) IV Push once  dextrose 50% Injectable 25 Gram(s) IV Push once  diltiazem    milliGRAM(s) Oral daily  glucagon  Injectable 1 milliGRAM(s) IntraMuscular once  insulin glargine Injectable (LANTUS) 15 Unit(s) SubCutaneous at bedtime  insulin lispro (ADMELOG) corrective regimen sliding scale   SubCutaneous three times a day before meals  insulin lispro (ADMELOG) corrective regimen sliding scale   SubCutaneous at bedtime  lidocaine   4% Patch 1 Patch Transdermal daily  melatonin 3 milliGRAM(s) Oral at bedtime  metoprolol succinate ER 50 milliGRAM(s) Oral daily  sodium chloride 0.9%. 1000 milliLiter(s) (75 mL/Hr) IV Continuous <Continuous>    MEDICATIONS  (PRN):  acetaminophen     Tablet .. 650 milliGRAM(s) Oral every 6 hours PRN Mild Pain (1 - 3)  dextrose Oral Gel 15 Gram(s) Oral once PRN Blood Glucose LESS THAN 70 milliGRAM(s)/deciliter  meclizine 25 milliGRAM(s) Oral every 8 hours PRN Dizziness  metoclopramide Injectable 10 milliGRAM(s) IV Push three times a day PRN nausea  ondansetron Injectable 4 milliGRAM(s) IV Push every 8 hours PRN Nausea and/or Vomiting  oxyCODONE    IR 5 milliGRAM(s) Oral every 6 hours PRN Moderate Pain (4 - 6)      Allergies    shellfish (Anaphylaxis)  aspirin (Rash)    Intolerances        SOCIAL HISTORY:    FAMILY HISTORY:          Physical Exam:  General: NAD, resting comfortably  HEENT: NC/AT, EOMI, normal hearing, no oral lesions, no LAD, neck supple  Pulmonary: normal resp effort, CTA-B  Cardiovascular: NSR, no murmurs  Abdominal: soft, distended, diffuse mild tenderness, no guarding no organomegaly  Extremities: WWP, normal strength, no clubbing/cyanosis/edema  Neuro: A/O x 3, CNs II-XII grossly intact, normal sensation, no focal deficits  Pulses: palpable distal pulses    Vital Signs Last 24 Hrs  T(C): 36.6 (17 Mar 2024 08:05), Max: 36.7 (16 Mar 2024 20:41)  T(F): 97.8 (17 Mar 2024 08:05), Max: 98 (16 Mar 2024 20:41)  HR: 73 (17 Mar 2024 08:05) (73 - 81)  BP: 152/60 (17 Mar 2024 08:05) (143/31 - 152/60)  BP(mean): --  RR: 18 (17 Mar 2024 08:05) (18 - 18)  SpO2: 95% (17 Mar 2024 08:05) (95% - 95%)    Parameters below as of 17 Mar 2024 08:05  Patient On (Oxygen Delivery Method): room air        I&O's Summary          LABS:                        12.4   8.03  )-----------( 227      ( 17 Mar 2024 06:11 )             39.1     03-17    138  |  104  |  86<H>  ----------------------------<  133<H>  4.0   |  23  |  2.20<H>    Ca    9.0      17 Mar 2024 06:11        Urinalysis Basic - ( 17 Mar 2024 06:11 )    Color: x / Appearance: x / SG: x / pH: x  Gluc: 133 mg/dL / Ketone: x  / Bili: x / Urobili: x   Blood: x / Protein: x / Nitrite: x   Leuk Esterase: x / RBC: x / WBC x   Sq Epi: x / Non Sq Epi: x / Bacteria: x      CAPILLARY BLOOD GLUCOSE      POCT Blood Glucose.: 112 mg/dL (17 Mar 2024 12:03)  POCT Blood Glucose.: 119 mg/dL (17 Mar 2024 07:54)  POCT Blood Glucose.: 151 mg/dL (16 Mar 2024 21:29)  POCT Blood Glucose.: 138 mg/dL (16 Mar 2024 17:23)        Cultures:  Culture Results:   No growth (03-15 @ 18:40)      RADIOLOGY & ADDITIONAL STUDIES:  < from: CT Abdomen and Pelvis No Cont (03.16.24 @ 18:30) >  FINDINGS:    LOWER CHEST: Status post TAVR.    LIVER: Within normal limits.  BILE DUCTS: Normal caliber.  GALLBLADDER: Status post cholecystectomy.  SPLEEN: Hypodense lesions measuring up to 1.9 cm. PANCREAS: Within normal   limits.  ADRENALS: Within normal limits.  KIDNEYS/URETERS: Right renal cyst.    BLADDER: Within normal limits.  REPRODUCTIVE ORGANS: Hysterectomy. No adnexal mass.    BOWEL: Small bowel obstruction with transition in the right lower   quadrant.  PERITONEUM: No ascites.  VESSELS:  Within normal limits.  RETROPERITONEUM/LYMPH NODES: No lymphadenopathy.  ABDOMINAL WALL: Within normal limits.  BONES: L2 L4 compression deformities.    IMPRESSION: Small bowel obstruction with transition in the right lower   quadrant.    < end of copied text >

## 2024-03-17 NOTE — CONSULT NOTE ADULT - ASSESSMENT
89y Female with a past medical history of HTN, HLD  AAS status post TAVR, paroxysmal atrial fibrillation, history of TIA, on current admission with aphasia consistent with TIA.  Gen surgery consulted for evaluation for SBO.    Recommendation  Repeat CTAP with oral contrast  Keep NPO for now, place NGT if patient have persistent vomiting   Serial abd examination   Rest of care per primary team   Monitor bowel function         Discussed with Attending

## 2024-03-17 NOTE — PROGRESS NOTE ADULT - ASSESSMENT
89 y/o F w/ PMH of HTN, dyslipidemia, PPM, paroxysmal a-fib (on coumadin), TIAs, DM2, p/w aphasia HEAD CT: Mild volume loss, microvascular disease, no acute hemorrhage or midline shift..    Aphasia suspected secondary to TIA  - symptoms resolved  - MRI negative for stroke, suspected TIA  - - CTH: No acute findings   - Neuro checks  - ASA allergy (patient states she develops hives w/ ASA use)   - Statin  - Tele monitoring  - Fall precautions     -SBO  General Surgery consulted   -NPO for bowel decompression     GURVINDER  Start IV fluids   Continue to monitor kidney function     Left Knee Pain  - Ct showing moderate effusion  - ortho consulted  S/P Knee aspiration   - pain control      Essential Hypertension  - Continue diltiazem to 60 q6h  - goal 150-170 sbp    Elevated troponin, suspect demand ischemia  - switched to eliquis  -ASA allergy   -Cardio following  -Tele monitoring  -Echo  Estimated left ventricular ejection fraction is 55-60 %.  -EKG Reviewed     Paroxysmal A-fib  - eliquis    DM2  -Humalog ISS + Basal insulin   -Diabetic diet     H/o dyslipidemia  -C/w home meds     Mild thrombocytopenia   -F/u outpatient for further management if remains stable     DVT ppx   - eliquis

## 2024-03-17 NOTE — PROGRESS NOTE ADULT - SUBJECTIVE AND OBJECTIVE BOX
HOSPITALIST ATTENDING PROGRESS NOTE    Chart and meds reviewed.      Subjective: Patient seen and examined. Continuing to have diarrhea. Dark emesis yesterday, CT abdomen showed SBO with tranistion point. Will make patient NPO and consult General surgery.       Additional results/Imaging, I have personally reviewed:    LABS:                            12.4   8.03  )-----------( 227      ( 17 Mar 2024 06:11 )             39.1     03-17    138  |  104  |  86<H>  ----------------------------<  133<H>  4.0   |  23  |  2.20<H>    Ca    9.0      17 Mar 2024 06:11              Urinalysis Basic - ( 17 Mar 2024 06:11 )    Color: x / Appearance: x / SG: x / pH: x  Gluc: 133 mg/dL / Ketone: x  / Bili: x / Urobili: x   Blood: x / Protein: x / Nitrite: x   Leuk Esterase: x / RBC: x / WBC x   Sq Epi: x / Non Sq Epi: x / Bacteria: x              All other systems reviewed and found to be negative with the exception of what has been described above.    MEDICATIONS  (STANDING):  apixaban 5 milliGRAM(s) Oral two times a day  atorvastatin 40 milliGRAM(s) Oral at bedtime  dextrose 5%. 1000 milliLiter(s) (50 mL/Hr) IV Continuous <Continuous>  dextrose 5%. 1000 milliLiter(s) (100 mL/Hr) IV Continuous <Continuous>  dextrose 50% Injectable 12.5 Gram(s) IV Push once  dextrose 50% Injectable 25 Gram(s) IV Push once  dextrose 50% Injectable 25 Gram(s) IV Push once  diltiazem    milliGRAM(s) Oral daily  glucagon  Injectable 1 milliGRAM(s) IntraMuscular once  insulin glargine Injectable (LANTUS) 15 Unit(s) SubCutaneous at bedtime  insulin lispro (ADMELOG) corrective regimen sliding scale   SubCutaneous three times a day before meals  insulin lispro (ADMELOG) corrective regimen sliding scale   SubCutaneous at bedtime  lidocaine   4% Patch 1 Patch Transdermal daily  melatonin 3 milliGRAM(s) Oral at bedtime  metoprolol succinate ER 50 milliGRAM(s) Oral daily  sodium chloride 0.9%. 1000 milliLiter(s) (75 mL/Hr) IV Continuous <Continuous>    MEDICATIONS  (PRN):  acetaminophen     Tablet .. 650 milliGRAM(s) Oral every 6 hours PRN Mild Pain (1 - 3)  dextrose Oral Gel 15 Gram(s) Oral once PRN Blood Glucose LESS THAN 70 milliGRAM(s)/deciliter  meclizine 25 milliGRAM(s) Oral every 8 hours PRN Dizziness  metoclopramide Injectable 10 milliGRAM(s) IV Push three times a day PRN nausea  ondansetron Injectable 4 milliGRAM(s) IV Push every 8 hours PRN Nausea and/or Vomiting  oxyCODONE    IR 5 milliGRAM(s) Oral every 6 hours PRN Moderate Pain (4 - 6)      VITALS:  T(F): 97.8 (03-17-24 @ 08:05), Max: 98 (03-16-24 @ 20:41)  HR: 73 (03-17-24 @ 08:05) (73 - 81)  BP: 152/60 (03-17-24 @ 08:05) (143/31 - 152/60)  RR: 18 (03-17-24 @ 08:05) (18 - 18)  SpO2: 95% (03-17-24 @ 08:05) (95% - 95%)  Wt(kg): --    I&O's Summary      CAPILLARY BLOOD GLUCOSE      POCT Blood Glucose.: 112 mg/dL (17 Mar 2024 12:03)  POCT Blood Glucose.: 119 mg/dL (17 Mar 2024 07:54)  POCT Blood Glucose.: 151 mg/dL (16 Mar 2024 21:29)  POCT Blood Glucose.: 138 mg/dL (16 Mar 2024 17:23)      PHYSICAL EXAM:  General: NAD, A/O x 3  ENT: No gross hearing impairment, Moist mucous membranes, no thrush  Neck: Supple, No JVD  Lungs: Clear to auscultation bilaterally, good air entry, non-labored breathing  Cardio: RRR, S1/S2, No murmur  Abdomen: Soft, Nontender, mild distended; soft Bowel sounds   Extremities: No calf tenderness, No cyanosis, No pitting edema  Psych: Appropriate mood and affect        CULTURES:      Telemetry, personally reviewed

## 2024-03-18 NOTE — PROGRESS NOTE ADULT - ASSESSMENT
89y Female with a past medical history of HTN, HLD  AAS status post TAVR, paroxysmal atrial fibrillation, history of TIA, on current admission with aphasia consistent with TIA.    Gen surgery consulted for evaluation for SBO.    Ct abd: Small bowel obstruction unchanged from yesterday's CT, with transition   point in the pelvis/right lower quadrant. The stomach is distended with   gas and oral contrast, as on prior.    NGT placed    Recommendation  Please follow NGT output  AXR in am  Serial abd examination   Rest of care per primary team   Monitor bowel function         Discussed with Attending  89y Female with a past medical history of HTN, HLD  AAS status post TAVR, paroxysmal atrial fibrillation, history of TIA, on current admission with aphasia consistent with TIA.    Gen surgery consulted for evaluation for SBO.    Ct abd: Small bowel obstruction unchanged from yesterday's CT, with transition   point in the pelvis/right lower quadrant. The stomach is distended with   gas and oral contrast, as on prior.    NGT placed    Recommendation  Please follow NGT output  AXR in am  Serial abd examination   Rest of care per primary team   Monitor bowel function         Discussed with Attending Dr. Pedraza     Attending A/P:    Chart reviewed, patient examined, agree with above resident evaluation in addition to the following  p/w SBO s/p open cholecystectomy and open hysterectomy, on eliquis for pacemaker and multiple comorbidities    high NG output, AXR after PO contrast with SB distension but no PO contrast seen likely suctioned out by NG will gastrograffin challenge after complete decompression and FU. Discussed with the patient that likely etiology is scar tissue from prior surgery and initial management is conservative with NG decompression and bowel rest, if fails to improve then we will consider operative intervention.     REC:   cardiology consult for AC management while NPO   will follow  gastrograffin challenge  optimize electrolytes K4 and Mag 2  replete high NG losses      Pt will be monitored for signs of evolution/resolution of pathology and surgical intervention as required and warranted  Pt aware of and agrees with all of the above    35 minuted of time spent on pt examination, review of relevant labs and radiologic studies, assured stabilization of pt, discussion with relevant services/providers for coordination of pt care and services 89y Female with a past medical history of HTN, HLD  AAS status post TAVR, paroxysmal atrial fibrillation, history of TIA, on current admission with aphasia consistent with TIA.    Gen surgery consulted for evaluation for SBO.    Ct abd: Small bowel obstruction unchanged from yesterday's CT, with transition   point in the pelvis/right lower quadrant. The stomach is distended with   gas and oral contrast, as on prior.    NGT placed    Recommendation  Please follow NGT output  AXR in am  Serial abd examination   Rest of care per primary team   Monitor bowel function         Discussed with Attending Dr. Pedraza     Attending A/P:    Chart reviewed, patient examined, agree with above resident evaluation in addition to the following  p/w TIA, PAF on AC pacemaker and several comorbidities, now concern for SBO s/p open cholecystectomy and open hysterectomy, on eliquis for pacemaker     high NG output, NG appears to be in pylorus, will pull back a few cm, AXR after PO contrast with SB distension but no PO contrast seen likely suctioned out by NG will gastrograffin challenge after complete decompression and FU. Discussed with the patient that likely etiology is scar tissue from prior surgery, however, given Afib there is a possibility of ischemic bowel, currently denies pain and on exam soft, mild distension, non tender, open RUQ scar from cholecystectomy, and initial management is conservative with NG decompression and bowel rest, if fails to improve then we will consider operative intervention.     REC:   cardiology for AC management while NPO   will follow  gastrograffin challenge  optimize electrolytes K4 and Mag 2  replete high NG losses      Pt will be monitored for signs of evolution/resolution of pathology and surgical intervention as required and warranted  Pt aware of and agrees with all of the above    35 minuted of time spent on pt examination, review of relevant labs and radiologic studies, assured stabilization of pt, discussion with relevant services/providers for coordination of pt care and services

## 2024-03-18 NOTE — PHARMACOTHERAPY INTERVENTION NOTE - COMMENTS
reduced lantus to 10 units due to hypoglycemia (POCT < 70) and patient is NPO
Medication reconciliation completed.  Reviewed Medication list and confirmed med allergies with patient; confirmed with Dr. First Medmauricio.

## 2024-03-18 NOTE — PROGRESS NOTE ADULT - SUBJECTIVE AND OBJECTIVE BOX
HOSPITALIST ATTENDING PROGRESS NOTE    Chart and meds reviewed.      Subjective: Patient seen and examined. Resting comfortably. NG tube placed last night, green bile draining. Abdomen softer. No BM this AM. Abdominal XR pending.  repeat CT unchaged from previous with SBO.       Additional results/Imaging, I have personally reviewed:    LABS:                            12.3   9.69  )-----------( 234      ( 18 Mar 2024 07:23 )             38.3     03-18    139  |  105  |  94<H>  ----------------------------<  64<L>  3.6   |  23  |  1.84<H>    Ca    8.8      18 Mar 2024 07:23              Urinalysis Basic - ( 18 Mar 2024 07:23 )    Color: x / Appearance: x / SG: x / pH: x  Gluc: 64 mg/dL / Ketone: x  / Bili: x / Urobili: x   Blood: x / Protein: x / Nitrite: x   Leuk Esterase: x / RBC: x / WBC x   Sq Epi: x / Non Sq Epi: x / Bacteria: x              All other systems reviewed and found to be negative with the exception of what has been described above.    MEDICATIONS  (STANDING):  atorvastatin 40 milliGRAM(s) Oral at bedtime  dextrose 5% + sodium chloride 0.9%. 1000 milliLiter(s) (75 mL/Hr) IV Continuous <Continuous>  dextrose 5%. 1000 milliLiter(s) (50 mL/Hr) IV Continuous <Continuous>  dextrose 5%. 1000 milliLiter(s) (100 mL/Hr) IV Continuous <Continuous>  dextrose 50% Injectable 12.5 Gram(s) IV Push once  dextrose 50% Injectable 25 Gram(s) IV Push once  dextrose 50% Injectable 25 Gram(s) IV Push once  diltiazem    milliGRAM(s) Oral daily  diltiazem Injectable 10 milliGRAM(s) IV Push every 8 hours  glucagon  Injectable 1 milliGRAM(s) IntraMuscular once  insulin glargine Injectable (LANTUS) 10 Unit(s) SubCutaneous at bedtime  insulin lispro (ADMELOG) corrective regimen sliding scale   SubCutaneous three times a day before meals  insulin lispro (ADMELOG) corrective regimen sliding scale   SubCutaneous at bedtime  lidocaine   4% Patch 1 Patch Transdermal daily  melatonin 3 milliGRAM(s) Oral at bedtime  metoprolol succinate ER 50 milliGRAM(s) Oral daily    MEDICATIONS  (PRN):  acetaminophen     Tablet .. 650 milliGRAM(s) Oral every 6 hours PRN Mild Pain (1 - 3)  dextrose Oral Gel 15 Gram(s) Oral once PRN Blood Glucose LESS THAN 70 milliGRAM(s)/deciliter  meclizine 25 milliGRAM(s) Oral every 8 hours PRN Dizziness  metoclopramide Injectable 10 milliGRAM(s) IV Push three times a day PRN nausea  ondansetron Injectable 4 milliGRAM(s) IV Push every 8 hours PRN Nausea and/or Vomiting  oxyCODONE    IR 5 milliGRAM(s) Oral every 6 hours PRN Moderate Pain (4 - 6)      VITALS:  T(F): 97.7 (03-18-24 @ 07:42), Max: 97.7 (03-17-24 @ 21:02)  HR: 66 (03-18-24 @ 07:42) (66 - 79)  BP: 133/42 (03-18-24 @ 07:42) (133/42 - 157/48)  RR: 18 (03-18-24 @ 07:42) (18 - 18)  SpO2: 96% (03-18-24 @ 07:42) (96% - 99%)  Wt(kg): --    I&O's Summary    17 Mar 2024 07:01  -  18 Mar 2024 07:00  --------------------------------------------------------  IN: 0 mL / OUT: 1800 mL / NET: -1800 mL        CAPILLARY BLOOD GLUCOSE      POCT Blood Glucose.: 72 mg/dL (18 Mar 2024 11:57)  POCT Blood Glucose.: 111 mg/dL (18 Mar 2024 09:52)  POCT Blood Glucose.: 66 mg/dL (18 Mar 2024 08:17)  POCT Blood Glucose.: 98 mg/dL (17 Mar 2024 22:40)  POCT Blood Glucose.: 119 mg/dL (17 Mar 2024 17:02)      PHYSICAL EXAM:  General: NAD, A/O x 3  ENT: No gross hearing impairment, Moist mucous membranes, no thrush  Neck: Supple, No JVD  Lungs: Clear to auscultation bilaterally, good air entry, non-labored breathing  Cardio: RRR, S1/S2, No murmur  Abdomen: Soft, Nontender, mild distended; soft Bowel sounds   Extremities: No calf tenderness, No cyanosis, No pitting edema  Psych: Appropriate mood and affect    CULTURES:      Telemetry, personally reviewed

## 2024-03-18 NOTE — PROGRESS NOTE ADULT - ASSESSMENT
89 y/o F w/ PMH of HTN, dyslipidemia, PPM, paroxysmal a-fib (on coumadin), TIAs, DM2, p/w aphasia HEAD CT: Mild volume loss, microvascular disease, no acute hemorrhage or midline shift..    Aphasia suspected secondary to TIA  - symptoms resolved  - MRI negative for stroke, suspected TIA  - - CTH: No acute findings   - Neuro checks  - ASA allergy (patient states she develops hives w/ ASA use)   - Statin  - Tele monitoring  - Fall precautions     -SBO  General Surgery consulted   -NPO for bowel decompression   NG tube in place  Monitor output   D5NS     GURVINDER  Continue IV fluids   Continue to monitor kidney function   improved to 1.84 today.     Left Knee Pain  - Ct showing moderate effusion  - ortho consulted  S/P Knee aspiration   - pain control      Essential Hypertension  - Continue diltiazem to 60 q6h  - goal 150-170 sbp    Elevated troponin, suspect demand ischemia  - switched to eliquis  -ASA allergy   -Cardio following  -Tele monitoring  -Echo  Estimated left ventricular ejection fraction is 55-60 %.  -EKG Reviewed     Paroxysmal A-fib  - eliquis    DM2  -Humalog ISS + Basal insulin   -Diabetic diet   Decrease basal insulin due to NPO status     H/o dyslipidemia  -C/w home meds     Mild thrombocytopenia   -F/u outpatient for further management if remains stable     DVT ppx   - eliquis

## 2024-03-18 NOTE — PROGRESS NOTE ADULT - SUBJECTIVE AND OBJECTIVE BOX
Pt seen at bedside, resting comfortable  Pt states abdomen is softer, no gas or BM yet  Pt denies pain, nausea, vomiting, fever, chills    ROS as above    Vitals:  T(C): 36.5 (03-17 @ 21:02), Max: 36.6 (03-17 @ 08:05)  HR: 79 (03-17 @ 22:48) (73 - 79)  BP: 145/59 (03-17 @ 22:48) (145/59 - 153/49)  RR: 18 (03-17 @ 21:02) (18 - 18)  SpO2: 99% (03-17 @ 21:02) (95% - 99%)    03-17 @ 07:01  -  03-18 @ 01:20  --------------------------------------------------------  IN:  Total IN: 0 mL    OUT:    Nasogastric/Oral tube (mL): 1450 mL  Total OUT: 1450 mL    Total NET: -1450 mL      Physical Exam:  General: AAOx3, elderly female, NAD, NGT in place  Chest: Normal respiratory effort  Heart: RRR  Abdomen: large pannus, soft, less distended, non tender  Neuro/Psych: No localized deficits. Normal speech, normal tone  Skin: Normal, no rashes, no lesions noted.       03-17 @ 06:11                    12.4  CBC: 8.03>)-------(<227                     39.1                 138 | 104 | 86    CMP:  ----------------------< 133               4.0 | 23 | 2.20                      Ca:9.0  Phos:-  Mg:-               -|      |-        LFTs:  ------|-|-----             -|      |-      Culture - Joint (collected 03-15-24 @ 18:40)  Source: Knee Synovial Fluid  Gram Stain (03-16-24 @ 02:53):    polymorphonuclear leukocytes seen    No organisms seen    by cytocentrifuge  Preliminary Report (03-16-24 @ 19:38):    No growth      Current Inpatient Medications:  acetaminophen     Tablet .. 650 milliGRAM(s) Oral every 6 hours PRN  atorvastatin 40 milliGRAM(s) Oral at bedtime  dextrose 5%. 1000 milliLiter(s) (50 mL/Hr) IV Continuous <Continuous>  dextrose 5%. 1000 milliLiter(s) (100 mL/Hr) IV Continuous <Continuous>  dextrose 50% Injectable 12.5 Gram(s) IV Push once  dextrose 50% Injectable 25 Gram(s) IV Push once  dextrose 50% Injectable 25 Gram(s) IV Push once  dextrose Oral Gel 15 Gram(s) Oral once PRN  diltiazem    milliGRAM(s) Oral daily  diltiazem Injectable 10 milliGRAM(s) IV Push every 8 hours  glucagon  Injectable 1 milliGRAM(s) IntraMuscular once  insulin glargine Injectable (LANTUS) 15 Unit(s) SubCutaneous at bedtime  insulin lispro (ADMELOG) corrective regimen sliding scale   SubCutaneous three times a day before meals  insulin lispro (ADMELOG) corrective regimen sliding scale   SubCutaneous at bedtime  lidocaine   4% Patch 1 Patch Transdermal daily  meclizine 25 milliGRAM(s) Oral every 8 hours PRN  melatonin 3 milliGRAM(s) Oral at bedtime  metoclopramide Injectable 10 milliGRAM(s) IV Push three times a day PRN  metoprolol succinate ER 50 milliGRAM(s) Oral daily  ondansetron Injectable 4 milliGRAM(s) IV Push every 8 hours PRN  oxyCODONE    IR 5 milliGRAM(s) Oral every 6 hours PRN  sodium chloride 0.9%. 1000 milliLiter(s) (75 mL/Hr) IV Continuous <Continuous>      Imaging:    < from: CT Abdomen and Pelvis w/ Oral Cont (03.17.24 @ 15:28) >  Small bowel obstruction unchanged from yesterday's CT, with transition   point in the pelvis/right lower quadrant. The stomach is distended with   gas and oral contrast, as on prior.    < end of copied text >   CC: SBO    Pt seen at bedside, resting comfortable  Pt states abdomen is softer, no gas or BM yet  Pt denies pain, nausea, vomiting, fever, chills    ROS as above    Vitals:  T(C): 36.5 (03-17 @ 21:02), Max: 36.6 (03-17 @ 08:05)  HR: 79 (03-17 @ 22:48) (73 - 79)  BP: 145/59 (03-17 @ 22:48) (145/59 - 153/49)  RR: 18 (03-17 @ 21:02) (18 - 18)  SpO2: 99% (03-17 @ 21:02) (95% - 99%)    03-17 @ 07:01  -  03-18 @ 01:20  --------------------------------------------------------  IN:  Total IN: 0 mL    OUT:    Nasogastric/Oral tube (mL): 1450 mL  Total OUT: 1450 mL    Total NET: -1450 mL      Physical Exam:  General: AAOx3, elderly female, NAD, NGT in place  Chest: Normal respiratory effort  Heart: RRR  Abdomen: large pannus, soft, less distended, non tender  Neuro/Psych: No localized deficits. Normal speech, normal tone  Skin: Normal, no rashes, no lesions noted.       03-17 @ 06:11                    12.4  CBC: 8.03>)-------(<227                     39.1                 138 | 104 | 86    CMP:  ----------------------< 133               4.0 | 23 | 2.20                      Ca:9.0  Phos:-  Mg:-               -|      |-        LFTs:  ------|-|-----             -|      |-      Culture - Joint (collected 03-15-24 @ 18:40)  Source: Knee Synovial Fluid  Gram Stain (03-16-24 @ 02:53):    polymorphonuclear leukocytes seen    No organisms seen    by cytocentrifuge  Preliminary Report (03-16-24 @ 19:38):    No growth      Current Inpatient Medications:  acetaminophen     Tablet .. 650 milliGRAM(s) Oral every 6 hours PRN  atorvastatin 40 milliGRAM(s) Oral at bedtime  dextrose 5%. 1000 milliLiter(s) (50 mL/Hr) IV Continuous <Continuous>  dextrose 5%. 1000 milliLiter(s) (100 mL/Hr) IV Continuous <Continuous>  dextrose 50% Injectable 12.5 Gram(s) IV Push once  dextrose 50% Injectable 25 Gram(s) IV Push once  dextrose 50% Injectable 25 Gram(s) IV Push once  dextrose Oral Gel 15 Gram(s) Oral once PRN  diltiazem    milliGRAM(s) Oral daily  diltiazem Injectable 10 milliGRAM(s) IV Push every 8 hours  glucagon  Injectable 1 milliGRAM(s) IntraMuscular once  insulin glargine Injectable (LANTUS) 15 Unit(s) SubCutaneous at bedtime  insulin lispro (ADMELOG) corrective regimen sliding scale   SubCutaneous three times a day before meals  insulin lispro (ADMELOG) corrective regimen sliding scale   SubCutaneous at bedtime  lidocaine   4% Patch 1 Patch Transdermal daily  meclizine 25 milliGRAM(s) Oral every 8 hours PRN  melatonin 3 milliGRAM(s) Oral at bedtime  metoclopramide Injectable 10 milliGRAM(s) IV Push three times a day PRN  metoprolol succinate ER 50 milliGRAM(s) Oral daily  ondansetron Injectable 4 milliGRAM(s) IV Push every 8 hours PRN  oxyCODONE    IR 5 milliGRAM(s) Oral every 6 hours PRN  sodium chloride 0.9%. 1000 milliLiter(s) (75 mL/Hr) IV Continuous <Continuous>      Imaging:    < from: CT Abdomen and Pelvis w/ Oral Cont (03.17.24 @ 15:28) >  Small bowel obstruction unchanged from yesterday's CT, with transition   point in the pelvis/right lower quadrant. The stomach is distended with   gas and oral contrast, as on prior.    < end of copied text >

## 2024-03-19 NOTE — PROGRESS NOTE ADULT - ASSESSMENT
89 y/o F w/ PMH of HTN, dyslipidemia, PPM, paroxysmal a-fib (on coumadin), TIAs, DM2, p/w aphasia HEAD CT: Mild volume loss, microvascular disease, no acute hemorrhage or midline shift..    Aphasia suspected secondary to TIA  - symptoms resolved  - MRI negative for stroke, suspected TIA  - - CTH: No acute findings   - Neuro checks  - ASA allergy (patient states she develops hives w/ ASA use)   - Statin  - Tele monitoring  - Fall precautions     -SBO  General Surgery consulted   -slowly advance diet   NG tube removed   Monitor output       GURVINDER  Continue IV fluids   Continue to monitor kidney function   improved to 1.38 today.     Left Knee Pain  - Ct showing moderate effusion  - ortho consulted  S/P Knee aspiration   - pain control      Essential Hypertension  - Continue diltiazem to 60 q6h  - goal 150-170 sbp    Elevated troponin, suspect demand ischemia  - switched to eliquis  -ASA allergy   -Cardio following  -Tele monitoring  -Echo  Estimated left ventricular ejection fraction is 55-60 %.  -EKG Reviewed     Paroxysmal A-fib  - eliquis    DM2  -Humalog ISS + Basal insulin   -Diabetic diet   Decrease basal insulin due to NPO status     H/o dyslipidemia  -C/w home meds     Mild thrombocytopenia   -F/u outpatient for further management if remains stable     DVT ppx   - eliquis

## 2024-03-19 NOTE — PROGRESS NOTE ADULT - SUBJECTIVE AND OBJECTIVE BOX
CC: SBO    Pt seen at bedside, resting comfortable  Pt states abdomen is softer, small amount of gas, no BM yet  Pt denies pain, nausea, vomiting, fever, chills    ROS as above    Physical Exam:  General: AAOx3, elderly female, NAD, NGT in place  Chest: Normal respiratory effort  Heart: RRR  Abdomen: large pannus, soft, less distended, non tender  Neuro/Psych: No localized deficits. Normal speech, normal tone  Skin: Normal, no rashes, no lesions noted.     Vitals:  T(C): 37.2 (03-18 @ 22:30), Max: 37.2 (03-18 @ 22:30)  HR: 93 (03-18 @ 22:30) (66 - 93)  BP: 162/56 (03-18 @ 22:30) (133/42 - 162/56)  RR: 17 (03-18 @ 22:30) (17 - 18)  SpO2: 95% (03-18 @ 22:30) (95% - 97%)    03-17 @ 07:01  -  03-18 @ 07:00  --------------------------------------------------------  IN:  Total IN: 0 mL    OUT:    Nasogastric/Oral tube (mL): 1800 mL  Total OUT: 1800 mL    Total NET: -1800 mL      03-18 @ 07:01  -  03-19 @ 01:12  --------------------------------------------------------  IN:  Total IN: 0 mL    OUT:    Nasogastric/Oral tube (mL): 900 mL  Total OUT: 900 mL    Total NET: -900 mL      03-18 @ 07:23                    12.3  CBC: 9.69>)-------(<234                     38.3                 139 | 105 | 94    CMP:  ----------------------< 64               3.6 | 23 | 1.84                      Ca:8.8  Phos:-  Mg:-               -|      |-        LFTs:  ------|-|-----             -|      |-      Culture - Joint (collected 03-15-24 @ 18:40)  Source: Knee Synovial Fluid  Gram Stain (03-16-24 @ 02:53):    polymorphonuclear leukocytes seen    No organisms seen    by cytocentrifuge  Preliminary Report (03-16-24 @ 19:38):    No growth      Current Inpatient Medications:  acetaminophen     Tablet .. 650 milliGRAM(s) Oral every 6 hours PRN  atorvastatin 40 milliGRAM(s) Oral at bedtime  dextrose 5% + sodium chloride 0.9%. 1000 milliLiter(s) (75 mL/Hr) IV Continuous <Continuous>  dextrose 5%. 1000 milliLiter(s) (50 mL/Hr) IV Continuous <Continuous>  dextrose 5%. 1000 milliLiter(s) (100 mL/Hr) IV Continuous <Continuous>  dextrose 50% Injectable 12.5 Gram(s) IV Push once  dextrose 50% Injectable 25 Gram(s) IV Push once  dextrose 50% Injectable 25 Gram(s) IV Push once  dextrose Oral Gel 15 Gram(s) Oral once PRN  diltiazem    milliGRAM(s) Oral daily  diltiazem Injectable 10 milliGRAM(s) IV Push every 8 hours  glucagon  Injectable 1 milliGRAM(s) IntraMuscular once  insulin glargine Injectable (LANTUS) 10 Unit(s) SubCutaneous at bedtime  insulin lispro (ADMELOG) corrective regimen sliding scale   SubCutaneous three times a day before meals  insulin lispro (ADMELOG) corrective regimen sliding scale   SubCutaneous at bedtime  lidocaine   4% Patch 1 Patch Transdermal daily  meclizine 25 milliGRAM(s) Oral every 8 hours PRN  melatonin 3 milliGRAM(s) Oral at bedtime  metoclopramide Injectable 10 milliGRAM(s) IV Push three times a day PRN  metoprolol succinate ER 50 milliGRAM(s) Oral daily  ondansetron Injectable 4 milliGRAM(s) IV Push every 8 hours PRN  oxyCODONE    IR 5 milliGRAM(s) Oral every 6 hours PRN    Imaging:   Small bowel series: official report not performed yet  Contrast seen in the colon   CC: SBO    Pt seen at bedside, resting comfortable  Pt states abdomen is softer, small amount of gas, multiple BMs  Pt denies pain, nausea, vomiting, fever, chills    ROS as above    Physical Exam:  General: AAOx3, elderly female, NAD, NGT in place  Chest: Normal respiratory effort  Heart: RRR  Abdomen: large pannus, soft, less distended, non tender  Neuro/Psych: No localized deficits. Normal speech, normal tone  Skin: Normal, no rashes, no lesions noted.     Vitals:  T(C): 37.2 (03-18 @ 22:30), Max: 37.2 (03-18 @ 22:30)  HR: 93 (03-18 @ 22:30) (66 - 93)  BP: 162/56 (03-18 @ 22:30) (133/42 - 162/56)  RR: 17 (03-18 @ 22:30) (17 - 18)  SpO2: 95% (03-18 @ 22:30) (95% - 97%)    03-17 @ 07:01  -  03-18 @ 07:00  --------------------------------------------------------  IN:  Total IN: 0 mL    OUT:    Nasogastric/Oral tube (mL): 1800 mL  Total OUT: 1800 mL    Total NET: -1800 mL      03-18 @ 07:01  -  03-19 @ 01:12  --------------------------------------------------------  IN:  Total IN: 0 mL    OUT:    Nasogastric/Oral tube (mL): 900 mL  Total OUT: 900 mL    Total NET: -900 mL      03-18 @ 07:23                    12.3  CBC: 9.69>)-------(<234                     38.3                 139 | 105 | 94    CMP:  ----------------------< 64               3.6 | 23 | 1.84                      Ca:8.8  Phos:-  Mg:-               -|      |-        LFTs:  ------|-|-----             -|      |-      Culture - Joint (collected 03-15-24 @ 18:40)  Source: Knee Synovial Fluid  Gram Stain (03-16-24 @ 02:53):    polymorphonuclear leukocytes seen    No organisms seen    by cytocentrifuge  Preliminary Report (03-16-24 @ 19:38):    No growth      Current Inpatient Medications:  acetaminophen     Tablet .. 650 milliGRAM(s) Oral every 6 hours PRN  atorvastatin 40 milliGRAM(s) Oral at bedtime  dextrose 5% + sodium chloride 0.9%. 1000 milliLiter(s) (75 mL/Hr) IV Continuous <Continuous>  dextrose 5%. 1000 milliLiter(s) (50 mL/Hr) IV Continuous <Continuous>  dextrose 5%. 1000 milliLiter(s) (100 mL/Hr) IV Continuous <Continuous>  dextrose 50% Injectable 12.5 Gram(s) IV Push once  dextrose 50% Injectable 25 Gram(s) IV Push once  dextrose 50% Injectable 25 Gram(s) IV Push once  dextrose Oral Gel 15 Gram(s) Oral once PRN  diltiazem    milliGRAM(s) Oral daily  diltiazem Injectable 10 milliGRAM(s) IV Push every 8 hours  glucagon  Injectable 1 milliGRAM(s) IntraMuscular once  insulin glargine Injectable (LANTUS) 10 Unit(s) SubCutaneous at bedtime  insulin lispro (ADMELOG) corrective regimen sliding scale   SubCutaneous three times a day before meals  insulin lispro (ADMELOG) corrective regimen sliding scale   SubCutaneous at bedtime  lidocaine   4% Patch 1 Patch Transdermal daily  meclizine 25 milliGRAM(s) Oral every 8 hours PRN  melatonin 3 milliGRAM(s) Oral at bedtime  metoclopramide Injectable 10 milliGRAM(s) IV Push three times a day PRN  metoprolol succinate ER 50 milliGRAM(s) Oral daily  ondansetron Injectable 4 milliGRAM(s) IV Push every 8 hours PRN  oxyCODONE    IR 5 milliGRAM(s) Oral every 6 hours PRN    Imaging:   Small bowel series: official report not performed yet  Contrast seen in the colon

## 2024-03-19 NOTE — PROGRESS NOTE ADULT - SUBJECTIVE AND OBJECTIVE BOX
HOSPITALIST ATTENDING PROGRESS NOTE    Chart and meds reviewed.      Subjective: Patient seen and examined. NG tube removed this AM. Abdomen is soft, Patient reports an episode of diarrhea thia AM. No further hemoptysis will resume AC.       Additional results/Imaging, I have personally reviewed:    LABS:                            11.9   11.40 )-----------( 202      ( 19 Mar 2024 07:49 )             37.1     03-19    146<H>  |  113<H>  |  62<H>  ----------------------------<  164<H>  3.2<L>   |  25  |  1.38<H>    Ca    8.6      19 Mar 2024 07:49    TPro  6.9  /  Alb  2.3<L>  /  TBili  0.4  /  DBili  x   /  AST  33  /  ALT  16  /  AlkPhos  87  03-19        LIVER FUNCTIONS - ( 19 Mar 2024 07:49 )  Alb: 2.3 g/dL / Pro: 6.9 gm/dL / ALK PHOS: 87 U/L / ALT: 16 U/L / AST: 33 U/L / GGT: x             Urinalysis Basic - ( 19 Mar 2024 07:49 )    Color: x / Appearance: x / SG: x / pH: x  Gluc: 164 mg/dL / Ketone: x  / Bili: x / Urobili: x   Blood: x / Protein: x / Nitrite: x   Leuk Esterase: x / RBC: x / WBC x   Sq Epi: x / Non Sq Epi: x / Bacteria: x              All other systems reviewed and found to be negative with the exception of what has been described above.    MEDICATIONS  (STANDING):  atorvastatin 40 milliGRAM(s) Oral at bedtime  dextrose 5% + sodium chloride 0.9%. 1000 milliLiter(s) (75 mL/Hr) IV Continuous <Continuous>  dextrose 5%. 1000 milliLiter(s) (100 mL/Hr) IV Continuous <Continuous>  dextrose 5%. 1000 milliLiter(s) (50 mL/Hr) IV Continuous <Continuous>  dextrose 50% Injectable 25 Gram(s) IV Push once  dextrose 50% Injectable 12.5 Gram(s) IV Push once  dextrose 50% Injectable 25 Gram(s) IV Push once  diltiazem    milliGRAM(s) Oral daily  diltiazem Injectable 10 milliGRAM(s) IV Push every 8 hours  glucagon  Injectable 1 milliGRAM(s) IntraMuscular once  insulin glargine Injectable (LANTUS) 10 Unit(s) SubCutaneous at bedtime  insulin lispro (ADMELOG) corrective regimen sliding scale   SubCutaneous three times a day before meals  insulin lispro (ADMELOG) corrective regimen sliding scale   SubCutaneous at bedtime  lidocaine   4% Patch 1 Patch Transdermal daily  melatonin 3 milliGRAM(s) Oral at bedtime  metoprolol succinate ER 50 milliGRAM(s) Oral daily    MEDICATIONS  (PRN):  acetaminophen     Tablet .. 650 milliGRAM(s) Oral every 6 hours PRN Mild Pain (1 - 3)  dextrose Oral Gel 15 Gram(s) Oral once PRN Blood Glucose LESS THAN 70 milliGRAM(s)/deciliter  meclizine 25 milliGRAM(s) Oral every 8 hours PRN Dizziness  metoclopramide Injectable 10 milliGRAM(s) IV Push three times a day PRN nausea  ondansetron Injectable 4 milliGRAM(s) IV Push every 8 hours PRN Nausea and/or Vomiting  oxyCODONE    IR 5 milliGRAM(s) Oral every 6 hours PRN Moderate Pain (4 - 6)      VITALS:  T(F): 98.4 (03-19-24 @ 07:36), Max: 99 (03-18-24 @ 22:30)  HR: 98 (03-19-24 @ 13:50) (86 - 98)  BP: 129/44 (03-19-24 @ 13:50) (129/44 - 162/56)  RR: 18 (03-19-24 @ 07:36) (17 - 18)  SpO2: 94% (03-19-24 @ 07:36) (94% - 95%)  Wt(kg): --    I&O's Summary    18 Mar 2024 07:01  -  19 Mar 2024 07:00  --------------------------------------------------------  IN: 0 mL / OUT: 1050 mL / NET: -1050 mL        CAPILLARY BLOOD GLUCOSE      POCT Blood Glucose.: 172 mg/dL (19 Mar 2024 12:08)  POCT Blood Glucose.: 139 mg/dL (19 Mar 2024 08:09)  POCT Blood Glucose.: 140 mg/dL (19 Mar 2024 06:18)  POCT Blood Glucose.: 110 mg/dL (18 Mar 2024 22:03)  POCT Blood Glucose.: 101 mg/dL (18 Mar 2024 17:35)      PHYSICAL EXAM:  General: NAD, A/O x 3  ENT: No gross hearing impairment, Moist mucous membranes, no thrush  Neck: Supple, No JVD  Lungs: Clear to auscultation bilaterally, good air entry, non-labored breathing  Cardio: RRR, S1/S2, No murmur  Abdomen: Soft, Nontender, mild distended; soft Bowel sounds   Extremities: No calf tenderness, No cyanosis, No pitting edema  Psych: Appropriate mood and affect      CULTURES:      Telemetry, personally reviewed

## 2024-03-19 NOTE — PROGRESS NOTE ADULT - ASSESSMENT
89y Female with a past medical history of HTN, HLD  AAS status post TAVR, paroxysmal atrial fibrillation, history of TIA, on current admission with aphasia consistent with TIA.    Gen surgery consulted for evaluation for SBO.    Ct abd: Small bowel obstruction unchanged from yesterday's CT, with transition   point in the pelvis/right lower quadrant. The stomach is distended with   gas and oral contrast, as on prior.    NGT placed 3/17, gastrograffin challenged performed, contrast is in the colon, small amount of gas as per pt    Recommendation    Clamp trial, check residual  DC NGT if low residual  Serial abd examination   Rest of care per primary team   Monitor bowel function   cardiology for AC management while NPO   optimize electrolytes K4 and Mag 2      Discussed with Attending         89y Female with a past medical history of HTN, HLD  AAS status post TAVR, paroxysmal atrial fibrillation, history of TIA, on current admission with aphasia consistent with TIA.    Gen surgery consulted for evaluation for SBO.    Ct abd: Small bowel obstruction unchanged from yesterday's CT, with transition   point in the pelvis/right lower quadrant. The stomach is distended with   gas and oral contrast, as on prior.    NGT placed 3/17, gastrograffin challenged performed, contrast is in the colon, small amount of gas as per pt    Recommendation    Clamp trial, check residual  DC NGT if low residual  Serial abd examination   Rest of care per primary team   Monitor bowel function   cardiology for AC management while NPO       Attending A/P:    Chart reviewed, patient examined, agree with above resident evaluation in addition to the following    gastrograffin challenge yesterday, contrast in rectum, NG removed in am, multiple Bms thereafter, OK to resume clears   optimize electrolytes K4 and Mag 2  will follow      Pt will be monitored for signs of evolution/resolution of pathology  Pt aware of and agrees with all of the above    35 minuted of time spent on pt examination, review of relevant labs and radiologic studies, assured stabilization of pt, discussion with relevant services/providers for coordination of pt care and services

## 2024-03-20 NOTE — PROGRESS NOTE ADULT - ASSESSMENT
89y Female with a past medical history of HTN, HLD  AAS status post TAVR, paroxysmal atrial fibrillation, history of TIA, on current admission with aphasia consistent with TIA.    Gen surgery consulted for evaluation for SBO.    Ct abd: Small bowel obstruction unchanged from yesterday's CT, with transition   point in the pelvis/right lower quadrant. The stomach is distended with   gas and oral contrast, as on prior.    NGT placed 3/17, gastrograffin challenged performed, contrast is in the colon, small amount of gas and BMs.  NGT removed, pt tolerating CLD but poor appetite    Recommendation      Serial abd examination   Rest of care per primary team   Monitor bowel function   FLD    Case discussed with Dr. Pedraza                     89y Female with a past medical history of HTN, HLD  AAS status post TAVR, paroxysmal atrial fibrillation, history of TIA, on current admission with aphasia consistent with TIA.    Gen surgery consulted for evaluation for SBO.    Ct abd: Small bowel obstruction unchanged from yesterday's CT, with transition   point in the pelvis/right lower quadrant. The stomach is distended with   gas and oral contrast, as on prior.    NGT placed 3/17, gastrograffin challenged performed, contrast is in the colon, small amount of gas and BMs.  NGT removed, pt tolerating CLD but poor appetite, mild pain and distention    Recommendation      Serial abd examination   Rest of care per primary team   Monitor bowel function   CLD    Case discussed with Dr. Pedraza                     89y Female with a past medical history of HTN, HLD  AAS status post TAVR, paroxysmal atrial fibrillation, history of TIA, on current admission with aphasia consistent with TIA.    Gen surgery consulted for evaluation for SBO.    Ct abd: Small bowel obstruction unchanged from yesterday's CT, with transition   point in the pelvis/right lower quadrant. The stomach is distended with   gas and oral contrast, as on prior.    NGT placed 3/17, gastrograffin challenged performed, contrast is in the colon, small amount of gas and BMs.  NGT removed, pt tolerating CLD but poor appetite, mild pain and distention    Recommendation      Serial abd examination   Rest of care per primary team   Monitor bowel function   CLD    Case discussed with Dr. Pedraza      Addendum:  - patient passing flatus, had multiple BM yesterday morning, tolerating clears w/o n/v  - advance diet as tolerated  - if concern for re-obstruction, please obtain CT with oral contrast  - surgery will follow peripherally. no intervention needed at this time  - encourage ambulation/PT

## 2024-03-20 NOTE — PROVIDER CONTACT NOTE (OTHER) - ASSESSMENT
At time of RRT patient responsive to sternal rub, VSS- see RRT record sheet.
Pt resting comfortably in bed and offers no complaints at this time. Previous assessment remains unchanged. Vital signs stable.

## 2024-03-20 NOTE — RAPID RESPONSE TEAM SUMMARY - NSSITUATIONBACKGROUNDRRT_GEN_ALL_CORE
Pt is a 89 y/o F w/ PMH of HTN, dyslipidemia, PPM, paroxysmal a-fib (on coumadin), TIAs, DM2, p/w aphasia.  HEAD CT demonstrated mild volume loss, microvascular disease, no acute hemorrhage or midline shift. MR brain without acute infarct. Noted to have SBO vs mesenteric ischemia, surgery following.    RRT called because patient became acutely altered mental status. Prior to RRT, patient was agitated and received 0.25mg Ativan.

## 2024-03-20 NOTE — RAPID RESPONSE TEAM SUMMARY - NSADDTLFINDINGSRRT_GEN_ALL_CORE
Patient arousable via deep painful stimuli. Patient opens her eyes and responds, although is somnolent. HR in 80s, SpO2 initially in mid 80s, increased to 100 on NRB. BGL >190. Blood gas without acidosis, hypercarbia or hypoxia. O2 requirements weaned.     Of note, prior CT abdomen demonstrates distended bowel with new pneumatosis and portal venous gas, concerning for ischemia. Discussed with surgery resident Dr. Rene, who states she will discuss with attending surgeon.

## 2024-03-20 NOTE — RAPID RESPONSE TEAM SUMMARY - NSOTHERINTERVENTIONSRRT_GEN_ALL_CORE
Continuous pulse oximetry and remote tele.     Okay to remain on unit with close follow up. Please recontact if her condition changes.

## 2024-03-20 NOTE — PROGRESS NOTE ADULT - ASSESSMENT
89 y/o F w/ PMH of HTN, dyslipidemia, PPM, paroxysmal a-fib (on coumadin), TIAs, DM2, p/w aphasia HEAD CT: Mild volume loss, microvascular disease, no acute hemorrhage or midline shift..    Aphasia suspected secondary to TIA  - symptoms resolved  - MRI negative for stroke, suspected TIA  - - CTH: No acute findings   - Neuro checks  - ASA allergy (patient states she develops hives w/ ASA use)   - Statin  - Tele monitoring  - Fall precautions     -SBO  General Surgery consulted   -slowly advance diet   NG tube removed   Monitor output   Repeat Ct abdomen with oral contrast       GURVINDER  Continue IV fluids   Continue to monitor kidney function    1.39 today.     Left Knee Pain  - Ct showing moderate effusion  - ortho consulted  S/P Knee aspiration   - pain control      Essential Hypertension  - Continue diltiazem to 60 q6h  - goal 150-170 sbp    Elevated troponin, suspect demand ischemia  - switched to eliquis  -ASA allergy   -Cardio following  -Tele monitoring  -Echo  Estimated left ventricular ejection fraction is 55-60 %.  -EKG Reviewed     Paroxysmal A-fib  - eliquis    DM2  -Humalog ISS + Basal insulin   -Diabetic diet   Decrease basal insulin due to NPO status     H/o dyslipidemia  -C/w home meds     Mild thrombocytopenia   -F/u outpatient for further management if remains stable     DVT ppx   - eliquis    89 y/o F w/ PMH of HTN, dyslipidemia, PPM, paroxysmal a-fib (on coumadin), TIAs, DM2, p/w aphasia HEAD CT: Mild volume loss, microvascular disease, no acute hemorrhage or midline shift..    Aphasia suspected secondary to TIA  - symptoms resolved  - MRI negative for stroke, suspected TIA  - - CTH: No acute findings   - Neuro checks  - ASA allergy (patient states she develops hives w/ ASA use)   - Statin  - Tele monitoring  - Fall precautions     -SBO  General Surgery consulted   -slowly advance diet   NG tube removed   Monitor output   Repeat Ct abdomen with oral contrast         GURVINDER  Continue IV fluids   Continue to monitor kidney function    1.39 today.     Left Knee Pain  - Ct showing moderate effusion  - ortho consulted  S/P Knee aspiration   - pain control      Essential Hypertension  - Continue diltiazem to 60 q6h  - goal 150-170 sbp    Elevated troponin, suspect demand ischemia  - switched to eliquis  -ASA allergy   -Cardio following  -Tele monitoring  -Echo  Estimated left ventricular ejection fraction is 55-60 %.  -EKG Reviewed     Paroxysmal A-fib  - eliquis    DM2  -Humalog ISS + Basal insulin   -Diabetic diet   Decrease basal insulin due to NPO status     H/o dyslipidemia  -C/w home meds     Mild thrombocytopenia   -F/u outpatient for further management if remains stable     DVT ppx   - eliquis     Addendum Reviewed repeat CT abdomen, persistent SBO with concern for ischemia, discussed with general surgery team for reevaluation, will repeat labs and lactic acid  NPO

## 2024-03-20 NOTE — PROGRESS NOTE ADULT - SUBJECTIVE AND OBJECTIVE BOX
HOSPITALIST ATTENDING PROGRESS NOTE    Chart and meds reviewed.      Subjective: Patient seen and examined. Had a BM yesterday but no BM this AM, patient reports that there is more pain in the abdomen, will order CT abdomen with oral contrast to reevaluate SBO.       Additional results/Imaging, I have personally reviewed:    LABS:                            11.8   12.65 )-----------( 201      ( 20 Mar 2024 07:07 )             37.9     03-20    144  |  115<H>  |  50<H>  ----------------------------<  129<H>  3.8   |  24  |  1.39<H>    Ca    8.6      20 Mar 2024 07:07    TPro  6.9  /  Alb  2.3<L>  /  TBili  0.4  /  DBili  x   /  AST  33  /  ALT  16  /  AlkPhos  87  03-19        LIVER FUNCTIONS - ( 19 Mar 2024 07:49 )  Alb: 2.3 g/dL / Pro: 6.9 gm/dL / ALK PHOS: 87 U/L / ALT: 16 U/L / AST: 33 U/L / GGT: x             Urinalysis Basic - ( 20 Mar 2024 07:07 )    Color: x / Appearance: x / SG: x / pH: x  Gluc: 129 mg/dL / Ketone: x  / Bili: x / Urobili: x   Blood: x / Protein: x / Nitrite: x   Leuk Esterase: x / RBC: x / WBC x   Sq Epi: x / Non Sq Epi: x / Bacteria: x              All other systems reviewed and found to be negative with the exception of what has been described above.    MEDICATIONS  (STANDING):  apixaban 5 milliGRAM(s) Oral two times a day  atorvastatin 40 milliGRAM(s) Oral at bedtime  dextrose 5%. 1000 milliLiter(s) (100 mL/Hr) IV Continuous <Continuous>  dextrose 5%. 1000 milliLiter(s) (50 mL/Hr) IV Continuous <Continuous>  dextrose 50% Injectable 25 Gram(s) IV Push once  dextrose 50% Injectable 12.5 Gram(s) IV Push once  dextrose 50% Injectable 25 Gram(s) IV Push once  diltiazem    milliGRAM(s) Oral daily  diltiazem Injectable 10 milliGRAM(s) IV Push every 8 hours  glucagon  Injectable 1 milliGRAM(s) IntraMuscular once  insulin glargine Injectable (LANTUS) 10 Unit(s) SubCutaneous at bedtime  insulin lispro (ADMELOG) corrective regimen sliding scale   SubCutaneous three times a day before meals  insulin lispro (ADMELOG) corrective regimen sliding scale   SubCutaneous at bedtime  lidocaine   4% Patch 1 Patch Transdermal daily  melatonin 3 milliGRAM(s) Oral at bedtime  metoprolol succinate ER 50 milliGRAM(s) Oral daily  traMADol 25 milliGRAM(s) Oral once    MEDICATIONS  (PRN):  acetaminophen     Tablet .. 650 milliGRAM(s) Oral every 6 hours PRN Mild Pain (1 - 3)  dextrose Oral Gel 15 Gram(s) Oral once PRN Blood Glucose LESS THAN 70 milliGRAM(s)/deciliter  meclizine 25 milliGRAM(s) Oral every 8 hours PRN Dizziness  metoclopramide Injectable 10 milliGRAM(s) IV Push three times a day PRN nausea  ondansetron Injectable 4 milliGRAM(s) IV Push every 8 hours PRN Nausea and/or Vomiting  oxyCODONE    IR 5 milliGRAM(s) Oral every 6 hours PRN Moderate Pain (4 - 6)      VITALS:  T(F): 97.8 (03-20-24 @ 12:06), Max: 99.9 (03-19-24 @ 20:00)  HR: 78 (03-20-24 @ 12:06) (70 - 84)  BP: 125/58 (03-20-24 @ 12:06) (114/45 - 143/53)  RR: 20 (03-20-24 @ 12:06) (18 - 20)  SpO2: 99% (03-20-24 @ 12:06) (93% - 99%)  Wt(kg): --    I&O's Summary      CAPILLARY BLOOD GLUCOSE      POCT Blood Glucose.: 165 mg/dL (20 Mar 2024 12:02)  POCT Blood Glucose.: 122 mg/dL (20 Mar 2024 08:24)  POCT Blood Glucose.: 145 mg/dL (19 Mar 2024 23:04)  POCT Blood Glucose.: 192 mg/dL (19 Mar 2024 16:28)      PHYSICAL EXAM:  General: NAD, A/O x 3  ENT: No gross hearing impairment, Moist mucous membranes, no thrush  Neck: Supple, No JVD  Lungs: Clear to auscultation bilaterally, good air entry, non-labored breathing  Cardio: RRR, S1/S2, No murmur  Abdomen: Soft, Nontender, mild distended; soft Bowel sounds   Extremities: No calf tenderness, No cyanosis, No pitting edema  Psych: Appropriate mood and affect      CULTURES:      Telemetry, personally reviewed

## 2024-03-20 NOTE — PROVIDER CONTACT NOTE (OTHER) - RECOMMENDATIONS
requesting an order for anti-coagulant to be administered through another route, IM or IV.
ABG, CXR, ABD XRAY,

## 2024-03-20 NOTE — CHART NOTE - NSCHARTNOTEFT_GEN_A_CORE
Patient was examined this morning, continued to have bowel movement and tolerated clears  Received call from hospitalist that patient is more distended, so CT was obtained which demonstrated pneumatosis with portal venous gas concerned for obstruction and ischemia    Patient had another bowel movement this afternoon, however given CT finding with dilated stomach and signs of obstruction, will drop a NG tube for decompression  Patient was started on eliquis which is discontinued. Please hold eliquis  Patient may warrant diagnostic laparoscopy, currently planned for Saturday if patient does not improve. Please continue holding eliquis    Plan:  - serial abd exam  - NG tube for decompression  - hold eliquis. can start dvt ppx  - antibiotics for concern of ischemia  - tentatively diagnostic laparoscopy on saturday after holding eliquis and if her abdomen is not improving Patient was examined this morning, continued to have bowel movement and tolerated clears  Received call from hospitalist that patient is more distended, so CT was obtained which demonstrated pneumatosis with portal venous gas concerned for obstruction and ischemia    Patient had another bowel movement this afternoon, however given CT finding with dilated stomach and signs of obstruction, will drop a NG tube for decompression  Patient was started on eliquis which is discontinued. Please hold eliquis  Patient may warrant diagnostic laparoscopy, currently planned for Saturday if patient does not improve. Please continue holding eliquis    Plan:  - serial abd exam  - NG tube for decompression  - hold eliquis. can start dvt ppx  - antibiotics for concern of ischemia  - tentatively diagnostic laparoscopy on saturday after holding eliquis and if her abdomen is not improving  - nutrition consult tomorrow for PPN

## 2024-03-20 NOTE — PROGRESS NOTE ADULT - SUBJECTIVE AND OBJECTIVE BOX
CC: SBO    Pt seen at bedside, resting comfortable  Pt states abdomen is softer, small amount of gas  Tolerating CLD  Pt denies pain, nausea, vomiting, fever, chills    ROS as above    Physical Exam:  General: AAOx3, elderly female, NAD,  Chest: Normal respiratory effort  Heart: RRR  Abdomen: large pannus, soft, non distended, non tender  Neuro/Psych: No localized deficits. Normal speech, normal tone  Skin: Normal, no rashes, no lesions noted.     Vitals:  T(C): 37.2 (03-19 @ 23:06), Max: 37.7 (03-19 @ 20:00)  HR: 84 (03-19 @ 23:06) (84 - 98)  BP: 127/54 (03-19 @ 23:06) (127/54 - 147/68)  RR: 18 (03-19 @ 23:06) (18 - 18)  SpO2: 96% (03-19 @ 23:06) (94% - 96%)    03-18 @ 07:01  -  03-19 @ 07:00  --------------------------------------------------------  IN:  Total IN: 0 mL    OUT:    Nasogastric/Oral tube (mL): 1050 mL  Total OUT: 1050 mL    Total NET: -1050 mL      03-19 @ 07:49                    11.9  CBC: 11.40>)-------(<202                     37.1                 146 | 113 | 62    CMP:  ----------------------< 164               3.2 | 25 | 1.38                      Ca:8.6  Phos:-  Mg:-               0.4|      |33        LFTs:  ------|87|-----             -|      |-      Culture - Joint (collected 03-15-24 @ 18:40)  Source: Knee Synovial Fluid  Gram Stain (03-16-24 @ 02:53):    polymorphonuclear leukocytes seen    No organisms seen    by cytocentrifuge  Preliminary Report (03-16-24 @ 19:38):    No growth      Current Inpatient Medications:  acetaminophen     Tablet .. 650 milliGRAM(s) Oral every 6 hours PRN  apixaban 5 milliGRAM(s) Oral two times a day  atorvastatin 40 milliGRAM(s) Oral at bedtime  dextrose 5%. 1000 milliLiter(s) (100 mL/Hr) IV Continuous <Continuous>  dextrose 5%. 1000 milliLiter(s) (50 mL/Hr) IV Continuous <Continuous>  dextrose 50% Injectable 25 Gram(s) IV Push once  dextrose 50% Injectable 12.5 Gram(s) IV Push once  dextrose 50% Injectable 25 Gram(s) IV Push once  dextrose Oral Gel 15 Gram(s) Oral once PRN  diltiazem    milliGRAM(s) Oral daily  diltiazem Injectable 10 milliGRAM(s) IV Push every 8 hours  glucagon  Injectable 1 milliGRAM(s) IntraMuscular once  insulin glargine Injectable (LANTUS) 10 Unit(s) SubCutaneous at bedtime  insulin lispro (ADMELOG) corrective regimen sliding scale   SubCutaneous three times a day before meals  insulin lispro (ADMELOG) corrective regimen sliding scale   SubCutaneous at bedtime  lidocaine   4% Patch 1 Patch Transdermal daily  meclizine 25 milliGRAM(s) Oral every 8 hours PRN  melatonin 3 milliGRAM(s) Oral at bedtime  metoclopramide Injectable 10 milliGRAM(s) IV Push three times a day PRN  metoprolol succinate ER 50 milliGRAM(s) Oral daily  ondansetron Injectable 4 milliGRAM(s) IV Push every 8 hours PRN  oxyCODONE    IR 5 milliGRAM(s) Oral every 6 hours PRN       CC: SBO    Pt seen at bedside, resting comfortable  Pt states no gas or BM today  Tolerating CLD, poor apetite  Pt denies pain, nausea, vomiting, fever, chills    ROS as above    Physical Exam:  General: AAOx3, elderly female, NAD,  Chest: Normal respiratory effort  Heart: RRR  Abdomen: large pannus, soft, mild diffuse tenderness, mildly distended  Neuro/Psych: No localized deficits. Normal speech, normal tone  Skin: Normal, no rashes, no lesions noted.     Vitals:  T(C): 37.2 (03-19 @ 23:06), Max: 37.7 (03-19 @ 20:00)  HR: 84 (03-19 @ 23:06) (84 - 98)  BP: 127/54 (03-19 @ 23:06) (127/54 - 147/68)  RR: 18 (03-19 @ 23:06) (18 - 18)  SpO2: 96% (03-19 @ 23:06) (94% - 96%)    03-18 @ 07:01  -  03-19 @ 07:00  --------------------------------------------------------  IN:  Total IN: 0 mL    OUT:    Nasogastric/Oral tube (mL): 1050 mL  Total OUT: 1050 mL    Total NET: -1050 mL      03-19 @ 07:49                    11.9  CBC: 11.40>)-------(<202                     37.1                 146 | 113 | 62    CMP:  ----------------------< 164               3.2 | 25 | 1.38                      Ca:8.6  Phos:-  Mg:-               0.4|      |33        LFTs:  ------|87|-----             -|      |-      Culture - Joint (collected 03-15-24 @ 18:40)  Source: Knee Synovial Fluid  Gram Stain (03-16-24 @ 02:53):    polymorphonuclear leukocytes seen    No organisms seen    by cytocentrifuge  Preliminary Report (03-16-24 @ 19:38):    No growth      Current Inpatient Medications:  acetaminophen     Tablet .. 650 milliGRAM(s) Oral every 6 hours PRN  apixaban 5 milliGRAM(s) Oral two times a day  atorvastatin 40 milliGRAM(s) Oral at bedtime  dextrose 5%. 1000 milliLiter(s) (100 mL/Hr) IV Continuous <Continuous>  dextrose 5%. 1000 milliLiter(s) (50 mL/Hr) IV Continuous <Continuous>  dextrose 50% Injectable 25 Gram(s) IV Push once  dextrose 50% Injectable 12.5 Gram(s) IV Push once  dextrose 50% Injectable 25 Gram(s) IV Push once  dextrose Oral Gel 15 Gram(s) Oral once PRN  diltiazem    milliGRAM(s) Oral daily  diltiazem Injectable 10 milliGRAM(s) IV Push every 8 hours  glucagon  Injectable 1 milliGRAM(s) IntraMuscular once  insulin glargine Injectable (LANTUS) 10 Unit(s) SubCutaneous at bedtime  insulin lispro (ADMELOG) corrective regimen sliding scale   SubCutaneous three times a day before meals  insulin lispro (ADMELOG) corrective regimen sliding scale   SubCutaneous at bedtime  lidocaine   4% Patch 1 Patch Transdermal daily  meclizine 25 milliGRAM(s) Oral every 8 hours PRN  melatonin 3 milliGRAM(s) Oral at bedtime  metoclopramide Injectable 10 milliGRAM(s) IV Push three times a day PRN  metoprolol succinate ER 50 milliGRAM(s) Oral daily  ondansetron Injectable 4 milliGRAM(s) IV Push every 8 hours PRN  oxyCODONE    IR 5 milliGRAM(s) Oral every 6 hours PRN

## 2024-03-21 NOTE — CONSULT NOTE ADULT - SUBJECTIVE AND OBJECTIVE BOX
0700- Pt report received from off going nurse. Pt resting with eyes closed, resp even and unlabored. 0830- Pt medicated per MAR, fed his breakfast, pt ate 30% of food. 1000- Pt resting on left  Side with eyes closed, resp even and unlabored,     1145- attempted to feed pt, ate his magic cup and drank all fluids    1400- Pt repositioned to right side, head at 20%, denies need or complaints    1600- Pt repositioned, began to cough and vomited; clear, orange tinted thick mucous in vomit. Pt stated he felt better after he vomited. Pt was sitting straight up in bed at almost 90 deg, RN was present when he vomited, lungs remained clear after. 1700- Pt refused to open his mouth to be fed, did drink his tea with meds in it. Jeanine Campbell HPI: Pt is a 89y old Female with hx of HTN, dyslipidemia, PPM, paroxysmal a-fib (on coumadin), TIAs, DM2, p/w aphasia. Patient states she finished her dinner and was feeling like her normal self, and then around 6pm her speech became abnormal when speaking to her . States her speech was coming out garbled, and this lasted for 10 minutes before resolving spontaneously. States she was able to walk herself to the bathroom and back after the episode. Denies any facial droop, weakness in arms / legs, sensory deficits, dysphadia, CP, SOB, nausea, vomiting, abdominal pain     Palliative consulted for GOC as patient developed SBO requiring surgery although not a surgical candidate.    3/21: Seen and examined at bedside. Patient drowsy, arouses to voice. A&Ox3. c/o pain in abdomen, NGT to suction with large bilious output. Patient's son, Kareem, and daughter-in-law at bedside. See GOC discussion below.      PAIN: (X)Yes   ( )No  Level: moderate- severe  Location: abdomen  Intensity:   7 /10  Quality:  Aggravating Factors: nothing  Alleviating Factors: medications  Radiation: no  Duration/Timing: constant  Impact on ADLs: no    DYSPNEA: (X) Yes  ( ) No  on 100% NRB- weak, tachypneic       PAST MEDICAL & SURGICAL HISTORY:  HTN (hypertension)  Paroxysmal atrial fibrillation  Type 2 diabetes mellitus with complication, with long-term current use of insulin  Carotid atherosclerosis  bilateral  AV block, 2nd degree  Carpal tunnel syndrome  HLD (hyperlipidemia)  Sensory neuropathy  Sick sinus syndrome  Vertigo  Arthritis  Spinal stenosis  GIACOMO (obstructive sleep apnea)  H/O: hysterectomy  due to fibroids  History of appendectomy  Artificial cardiac pacemaker  6/ 2009  History of transcatheter aortic valve replacement (TAVR)  4/2019  History of cholecystectomy      SOCIAL HX:    Hx opiate tolerance ( )YES  ( )NO    Baseline ADLs  (Prior to Admission)  (X) Independent   ( )Dependent      FAMILY HISTORY:      Review of Systems:    Anxiety-   Depression-  Physical Discomfort- ++  Dyspnea- ++  Constipation- +SBO  Diarrhea- denies  Nausea- denies  Vomiting- denies  Anorexia- NPO  Weight Loss-   Cough- denies  Secretions-  Fatigue- ++  Weakness- ++  Delirium- denies    All other systems reviewed and negative  Unable to obtain/Limited due to:       PHYSICAL EXAM:    Vital Signs Last 24 Hrs  T(C): 37.9 (21 Mar 2024 06:18), Max: 37.9 (21 Mar 2024 06:18)  T(F): 100.2 (21 Mar 2024 06:18), Max: 100.2 (21 Mar 2024 06:18)  HR: 88 (21 Mar 2024 08:17) (78 - 102)  BP: 133/58 (21 Mar 2024 08:17) (117/59 - 146/83)  BP(mean): 74 (20 Mar 2024 14:10) (74 - 74)  RR: 17 (21 Mar 2024 08:17) (17 - 17)  SpO2: 100% (21 Mar 2024 08:17) (95% - 100%)    Parameters below as of 21 Mar 2024 08:17  Patient On (Oxygen Delivery Method): mask, Venturi      Daily     Daily     PPSV2:  10%  FAST:    General: drowsy, elderly woman in mild distress  Mental Status: A&Ox3  HEENT: NGT to suction, on 100% NRB  Lungs: coarse b/l  Cardiac: irregular rate and rhythm  GI: abdomen TTP all quadrants. no flatus   : no suprapubic tenderness  Ext: MAEx4  Neuro: A&Ox3. Speech intact. No focal neuro deficits      LABS:                        12.2   15.03 )-----------( 234      ( 21 Mar 2024 07:06 )             39.0     03-21    139  |  104  |  59<H>  ----------------------------<  290<H>  4.4   |  27  |  2.86<H>    Ca    8.5      21 Mar 2024 07:06    TPro  6.8  /  Alb  2.0<L>  /  TBili  0.8  /  DBili  x   /  AST  28  /  ALT  13  /  AlkPhos  81  03-21    PT/INR - ( 21 Mar 2024 07:06 )   PT: 20.2 sec;   INR: 1.82 ratio         PTT - ( 21 Mar 2024 07:06 )  PTT:30.6 sec  Albumin: Albumin: 2.0 g/dL (03-21 @ 07:06)      Allergies    shellfish (Anaphylaxis)  aspirin (Rash)    Intolerances      MEDICATIONS  (STANDING):  atorvastatin 40 milliGRAM(s) Oral at bedtime  dextrose 5% + sodium chloride 0.9%. 1000 milliLiter(s) (75 mL/Hr) IV Continuous <Continuous>  dextrose 5%. 1000 milliLiter(s) (100 mL/Hr) IV Continuous <Continuous>  dextrose 5%. 1000 milliLiter(s) (50 mL/Hr) IV Continuous <Continuous>  dextrose 50% Injectable 25 Gram(s) IV Push once  dextrose 50% Injectable 12.5 Gram(s) IV Push once  dextrose 50% Injectable 25 Gram(s) IV Push once  diltiazem    milliGRAM(s) Oral daily  diltiazem Injectable 10 milliGRAM(s) IV Push every 8 hours  glucagon  Injectable 1 milliGRAM(s) IntraMuscular once  insulin glargine Injectable (LANTUS) 10 Unit(s) SubCutaneous at bedtime  insulin lispro (ADMELOG) corrective regimen sliding scale   SubCutaneous three times a day before meals  insulin lispro (ADMELOG) corrective regimen sliding scale   SubCutaneous at bedtime  lidocaine   4% Patch 1 Patch Transdermal daily  lipid, fat emulsion (Fish Oil and Plant Based) 20% Infusion 0.9158 Gm/kG/Day (31.3 mL/Hr) IV Continuous <Continuous>  melatonin 3 milliGRAM(s) Oral at bedtime  metoprolol succinate ER 50 milliGRAM(s) Oral daily  Parenteral Nutrition - Adult 1 Each (92 mL/Hr) TPN Continuous <Continuous>  piperacillin/tazobactam IVPB.. 3.375 Gram(s) IV Intermittent every 8 hours    MEDICATIONS  (PRN):  acetaminophen     Tablet .. 650 milliGRAM(s) Oral every 6 hours PRN Mild Pain (1 - 3)  dextrose Oral Gel 15 Gram(s) Oral once PRN Blood Glucose LESS THAN 70 milliGRAM(s)/deciliter  meclizine 25 milliGRAM(s) Oral every 8 hours PRN Dizziness  metoclopramide Injectable 10 milliGRAM(s) IV Push three times a day PRN nausea  ondansetron Injectable 4 milliGRAM(s) IV Push every 8 hours PRN Nausea and/or Vomiting  oxyCODONE    IR 5 milliGRAM(s) Oral every 6 hours PRN Moderate Pain (4 - 6)      RADIOLOGY/ADDITIONAL STUDIES:  < from: Xray Chest 1 View-PORTABLE IMMEDIATE (Xray Chest 1 View-PORTABLE IMMEDIATE .) (03.21.24 @ 07:56) >  ACC: [id number] EXAM:  XR CHEST PORTABLE IMMED 1V   ORDERED BY: DANIELLE WATSON     ACC: [id number] EXAM:  XR CHEST PORTABLE ROUTINE 1V   ORDERED BY:   COLLINS DOAN     PROCEDURE DATE:  03/20/2024          INTERPRETATION:  TIME OF EXAM: March 20, 2024 at 1:45 PM.    CLINICAL INFORMATION: Shortness of breath.    COMPARISON:  March 17, 2024.    TECHNIQUE:   AP Portable chest x-ray. Limited by rotation. The chin   obscures the right apex.    INTERPRETATION:    Heart size and the mediastinumcannot be accurately evaluated on this   projection.  There is a left chest wall cardiac pacemaker. The generator obscures part   of the left lung.  Status post TAVR.  There are low lung volumes.  There is new right basilar opacity with obscuration of the right   hemidiaphragm.  The visualized left lung is clear.  No left pleural effusion is seen.  No pneumothorax is seen however part of the right apex is obscured and   not evaluated.  There is osteoarthritic degenerative change of the spine.  There is a distended gas filled stomach.    AP portable chest x-ray from March 21, 2024 at 7:28 AM:    CLINICAL INFORMATION: NG tube placement.    COMPARISON: CT scan of the chest from March 20, 2024 at 7:36 PM.    INTERPRETATION:    The image is rotated.  Left chest wall pacemaker generator obscures part of the left lung.  Enteric tube extends into the abdomen. The tip is not included on the   image but could be in the distal stomach or duodenum.  There are continued low lung volumes.  A small right pleural effusion with extension into a fissure and likely   associated passive atelectasis is not significantly changed. There is new   superimposed patchy right lower lung opacity.  The visualized left lung is clear.  No left pleural effusion is seen.  No pneumothorax is noted.        IMPRESSION:  Enteric tube extends into the abdomen. Tip not included on   the image but could be in the distal stomach or duodenum. An abdominal   x-ray was obtained and will be reported separately.    Continued low lung volumes.    Small right pleural effusion with extension into a fissure and likely   associated passive atelectasis, not significantly changed.    New superimposed patchy right lower lung opacity could be due to   atelectasis or developing infection.    --- End of Report ---            ADINA FRANCOIS MD; Attending Radiologist  This document has been electronically signed. Mar 21 2024 11:36AM    < end of copied text >    < from: Xray Abdomen 1 View PORTABLE -Urgent (Xray Abdomen 1 View PORTABLE -Urgent .) (03.20.24 @ 19:46) >    ACC: [id number] EXAM:  XR ABDOMEN PORTABLE URGENT 1V   ORDERED BY: DANIELLE SALMON TORRE     PROCEDURE DATE:  03/20/2024          INTERPRETATION:  KUB: AP    COMPARISON: 3/18/2024 .    CLINICAL INFORMATION: SBO..    FINDINGS: NG tube tip in stomach.    Increased air distended small bowel, maximal luminal diameter measuring   5.4 cm consistent with small bowel obstruction. Large bowel decompressed.  No free intra-abdominal air.  No abnormal calcifications.  The osseous structures are intact.    IMPRESSION:    SBO.    --- End of Report ---            MARIANNE ALEMAN MD; Attending Radiologist  This document has been electronically signed. Mar 21 2024  1:06PM    < end of copied text >    < from: MR Head No Cont (03.15.24 @ 11:12) >  ACC: [id number] EXAM:  MR ANGIO NECK   ORDERED BY: BLANCHE GIBBS     ACC: [id number] EXAM:  MR ANGIO BRAIN   ORDERED BY: BLANCHE GIBBS     ACC: [id number] EXAM:  MR BRAIN   ORDERED BY: BLANCHE GIBBS     PROCEDURE DATE:  03/15/2024          INTERPRETATION:  CLINICAL STATEMENT: TIA. Evaluate for CVA.    TECHNIQUE: Multiplanar multisequence noncontrast MRI of the brain was   performed. Diffusion weighted imaging was performed. MRA of the neck and   brain utilizing 2-D and 3-D time-of-flight technique. MIP imaging   provided. Degree of extracranial internal carotid artery stenosis   calculated utilizing NASCET criteria. Multiple images degraded by motion   artifact.  COMPARISON: CT brain 3/11/2024.    FINDINGS:    MRI BRAIN    No areas of restricted diffusion are present.    Corpus callosum, pituitary and pineal regions appear unremarkable. No   tonsillar herniation or evidence of marrow replacement process. Interval   change visualized cervical spine.    CP angle and IAC regions appear within normal limits, as do the globes,   intraconal regions and major intracranial flow-voids. Bilateral lens   replacement surgery. No paranasal sinus air-fluid levels or opacification   is evident. No mastoid effusion.    No evidence of extra-axial collection,hemorrhage, mass or mass effect.   Moderate, age-appropriate atrophy with both confluent and multiple   scattered foci of increased T2 and FLAIR signal deann, periventricular and   juxtacortical white matter bilateral cerebral hemispheres; a nonspecific   finding which statistically reflects chronic microvascular ischemic   change.    MRA NECK    Degraded by motion artifact.    Aortic arch and great vessel origins grossly unremarkable. Bilateral   vertebral arteries patent and codominant. Evidenceof mild-moderate   (approximate 50%) stenosis bilateral proximal internal carotid arteries   within the limitations of this exam, as well as an aberrant medial course   bilateral proximal internal carotid arteries. No definitive evidence of   high-grade stenosis, occlusion or dissection bilateral extracranial   carotid arteries.    MRA BRAIN    Conventional origin bilateral posterior cerebral arteries with patent   right greater than left posterior communicating arteries. No large vessel   occlusion, proximal significant stenosis, definitive aneurysm or vascular   malformation.    IMPRESSION:    MRI BRAIN  1. No evidence of acute infarct, hemorrhage or mass effect.  2. Additional findings described in detail above.    MRA NECK  1. Degraded bymotion artifact.  2. Vertebral arteries patent and codominant.  3. Evidence of up to approximately 50% (mild-moderate) stenosis bilateral   proximal internal carotid arteries with an aberrant medial course. No   evidence of severe stenosis, occlusionor dissection bilateral   extracranial carotid arteries.    MRA BRAIN  1. No large vessel occlusion, proximal significant stenosis, definitive   aneurysm or vascular malformation.  2. Additional findings described in detail above.    --- End of Report ---            FINA VILLAREAL M.D., ATTENDING RADIOLOGIST  This document has been electronically signed. Mar 15 2024 11:38AM    < end of copied text >

## 2024-03-21 NOTE — PROGRESS NOTE ADULT - SUBJECTIVE AND OBJECTIVE BOX
Patient seen and examined and the case was discussed with the family member at bedside.  The patients O2 requirements have increased, lactate is trending upwards as well as the lactate.    ICU Vital Signs Last 24 Hrs  T(C): 37.9 (21 Mar 2024 06:18), Max: 37.9 (21 Mar 2024 06:18)  T(F): 100.2 (21 Mar 2024 06:18), Max: 100.2 (21 Mar 2024 06:18)  HR: 88 (21 Mar 2024 08:17) (78 - 102)  BP: 133/58 (21 Mar 2024 08:17) (117/59 - 146/83)  BP(mean): 74 (20 Mar 2024 14:10) (71 - 74)  RR: 17 (21 Mar 2024 08:17) (17 - 20)  SpO2: 100% (21 Mar 2024 08:17) (95% - 100%)    O2 Parameters below as of 21 Mar 2024 08:17  Patient On (Oxygen Delivery Method): mask, Venturi    ABG - ( 20 Mar 2024 19:29 )  pH, Arterial: 7.44  pH, Blood: x     /  pCO2: 35    /  pO2: 112   / HCO3: 24    / Base Excess: 0.1   /  SaO2: 98                                          12.2   15.03 )-----------( 234      ( 21 Mar 2024 07:06 )             39.0         03-21    139  |  104  |  59<H>  ----------------------------<  290<H>  4.4   |  27  |  2.86<H>    Ca    8.5      21 Mar 2024 07:06    TPro  6.8  /  Alb  2.0<L>  /  TBili  0.8  /  DBili  x   /  AST  28  /  ALT  13  /  AlkPhos  81  03-21    LIVER FUNCTIONS - ( 21 Mar 2024 07:06 )  Alb: 2.0 g/dL / Pro: 6.8 gm/dL / ALK PHOS: 81 U/L / ALT: 13 U/L / AST: 28 U/L / GGT: x               PT/INR - ( 21 Mar 2024 07:06 )   PT: 20.2 sec;   INR: 1.82 ratio         PTT - ( 21 Mar 2024 07:06 )  PTT:30.6 sec      < from: CT Abdomen and Pelvis w/ Oral Cont (03.20.24 @ 15:37) >  IMPRESSION:  Persistent small bowel obstruction, now with new small bowel pneumatosis   and portal venous gas worrisome for ischemia.    < end of copied text >  < from: CT Abdomen and Pelvis w/ Oral Cont (03.20.24 @ 15:37) >  IMPRESSION:  Persistent small bowel obstruction, now with new small bowel pneumatosis   and portal venous gas worrisome for ischemia.    < end of copied text >    PE  Respirations- labored on high O2 NRB mask.  Abd- soft, non distended but with increased pain and rebound.

## 2024-03-21 NOTE — PROGRESS NOTE ADULT - ASSESSMENT
89y Female with a past medical history of HTN, HLD  AAS status post TAVR, paroxysmal atrial fibrillation, history of TIA, on current admission with aphasia consistent with TIA.    Gen surgery consulted for evaluation for SBO.    Ct abd: Small bowel obstruction unchanged from yesterday's CT, with transition   point in the pelvis/right lower quadrant. The stomach is distended with   gas and oral contrast, as on prior.    NGT placed 3/17, gastrograffin challenged performed, contrast is in the colon, small amount of gas and BMs.  NGT removed, pt tolerating CLD but poor appetite, mild pain and distention    Repeat Ct scan showed recurrent SBO with portal gas and pneumatosis, NGT placed 2 L on insertion  Overnight lethargic, INR elevated to 10, troponins 231    Recommendation    keep NGT to cont low wall suction  monitor VS  Serial abd examination   consider upgrade to higher level of care  eliquis DCed  lovenox therapeutic  AXR in am  Rest of care per primary team       Case discussed with Dr. Pedraza                   89y Female with a past medical history of HTN, HLD  AAS status post TAVR, paroxysmal atrial fibrillation, history of TIA, on current admission with aphasia consistent with TIA.    Gen surgery consulted for evaluation for SBO.    Ct abd: Small bowel obstruction unchanged from yesterday's CT, with transition   point in the pelvis/right lower quadrant. The stomach is distended with   gas and oral contrast, as on prior.    NGT placed 3/17, gastrograffin challenged performed, contrast is in the colon, small amount of gas and BMs.  NGT removed, pt tolerating CLD but poor appetite, mild pain and distention    Repeat Ct scan showed recurrent SBO with portal gas and pneumatosis, NGT placed 2 L on insertion  Overnight lethargic, INR elevated to 10, troponins 231    Recommendation    keep NGT to cont low wall suction  monitor VS  Serial abd examination   eliquis DCed  lovenox therapeutic  AXR in am  Rest of care per primary team   palliation consult

## 2024-03-21 NOTE — PROGRESS NOTE ADULT - ASSESSMENT
89 y/o F w/ PMH of HTN, dyslipidemia, PPM, paroxysmal a-fib (on coumadin), TIAs, DM2, p/w aphasia HEAD CT: Mild volume loss, microvascular disease, no acute hemorrhage or midline shift..      -SBO  General Surgery consulted   -slowly advance diet   NG tube replaced    Monitor output   Repeat Ct abdomen with oral contrast  persistent SBO with concern for ischemia,   Not a surgical candidate  Palliative care consulted, poor prognosis   NPO      Aphasia suspected secondary to TIA  - symptoms resolved  - MRI negative for stroke, suspected TIA  - - CTH: No acute findings   - Neuro checks  - ASA allergy (patient states she develops hives w/ ASA use)   - Statin  - Tele monitoring  - Fall precautions          GURVINDER  Continue IV fluids   Continue to monitor kidney function       Left Knee Pain  - Ct showing moderate effusion  - ortho consulted  S/P Knee aspiration   - pain control      Essential Hypertension  - Continue diltiazem to 60 q6h  - goal 150-170 sbp    Elevated troponin, suspect demand ischemia  - switched to eliquis  -ASA allergy   -Cardio following  -Tele monitoring  -Echo  Estimated left ventricular ejection fraction is 55-60 %.  -EKG Reviewed     Paroxysmal A-fib  - eliquis    DM2  -Humalog ISS + Basal insulin   -Diabetic diet   Decrease basal insulin due to NPO status     H/o dyslipidemia  -C/w home meds     Mild thrombocytopenia   -F/u outpatient for further management if remains stable     DVT ppx   - eliquis    89 y/o F w/ PMH of HTN, dyslipidemia, PPM, paroxysmal a-fib (on coumadin), TIAs, DM2, p/w aphasia HEAD CT: Mild volume loss, microvascular disease, no acute hemorrhage or midline shift..      -SBO  General Surgery consulted   -slowly advance diet   NG tube replaced    Monitor output   Repeat Ct abdomen with oral contrast  persistent SBO with concern for ischemia,   Not a surgical candidate  Palliative care consulted, poor prognosis   NPO  Continue IV antibiotics     Aphasia suspected secondary to TIA  - symptoms resolved  - MRI negative for stroke, suspected TIA  - - CTH: No acute findings   - Neuro checks  - ASA allergy (patient states she develops hives w/ ASA use)   - Statin  - Tele monitoring  - Fall precautions          GURVINDER  Continue IV fluids   Continue to monitor kidney function       Left Knee Pain  - Ct showing moderate effusion  - ortho consulted  S/P Knee aspiration   - pain control      Essential Hypertension  - Continue diltiazem to 60 q6h  - goal 150-170 sbp    Elevated troponin, suspect demand ischemia  - switched to eliquis  -ASA allergy   -Cardio following  -Tele monitoring  -Echo  Estimated left ventricular ejection fraction is 55-60 %.  -EKG Reviewed     Paroxysmal A-fib  - eliquis    DM2  -Humalog ISS + Basal insulin   -Diabetic diet   Decrease basal insulin due to NPO status     H/o dyslipidemia  -C/w home meds     Mild thrombocytopenia   -F/u outpatient for further management if remains stable     DVT ppx   - eliquis

## 2024-03-21 NOTE — PROVIDER CONTACT NOTE (OTHER) - ACTION/TREATMENT ORDERED:
MD States will order Lovenox Injection. Awaiting order, will continue to monitor.
MD advised to replace NGT.
Stay in room. See RRT note pateint responsive at the end of RRT.

## 2024-03-21 NOTE — PROVIDER CONTACT NOTE (OTHER) - SITUATION
RRT called for patient unresponsive, labored breathing s/p NG tube insertion
Patient pulled out NGT
Pt had a CT A done today showing small bowel obstruction, NG tube placed. Pt NPO but has scheduled PO eliquis at 10 pm.

## 2024-03-21 NOTE — PROVIDER CONTACT NOTE (OTHER) - BACKGROUND
Pt admitted with SBO, NGT placed on 3/20 at low continuous suction.
Patient admitted with AMS w/o stroke vs. TIA complicated with SBO. Patient received on this shift A&Ox4, forgetful at times; with complaints of abd pain and left knee pain.
Admitted for TIA

## 2024-03-21 NOTE — PROGRESS NOTE ADULT - SUBJECTIVE AND OBJECTIVE BOX
CC: SBO    Pt seen at bedside, more lethargic than other days  no gas or BM   Overnight, NGT placed, rapid response called due to lethargy.      ROS unable to obtain due to AMS    Physical Exam:  General: elderly female, lethargic  Chest: increased respiratory effort on face mask O2  Heart: RRR  Abdomen: large pannus, soft, diffuse tenderness, distended  Neuro/Psych: No localized deficits.  Skin: Normal, no rashes, no lesions noted.     Vitals:  T(C): 36.9 (03-20 @ 23:00), Max: 37.6 (03-20 @ 14:10)  HR: 102 (03-20 @ 23:00) (70 - 102)  BP: 146/83 (03-20 @ 23:00) (114/45 - 146/83)  RR: 17 (03-20 @ 23:00) (17 - 20)  SpO2: 95% (03-20 @ 23:00) (93% - 99%)    03-20 @ 07:01  -  03-21 @ 03:24  --------------------------------------------------------  IN:  Total IN: 0 mL    OUT:    Nasogastric/Oral tube (mL): 2000 mL  Total OUT: 2000 mL    Total NET: -2000 mL      03-20 @ 19:57                    13.1  CBC: 15.26>)-------(<255                     42.5                 140 | 107 | 53    CMP:  ----------------------< 204               4.3 | 26 | 2.03                      Ca:9.0  Phos:-  Mg:-               0.4|      |36        LFTs:  ------|99|-----             -|      |-  03-20 @ 16:57                    12.8  CBC: 13.73>)-------(<236                     41.1                 139 | 110 | 52    CMP:  ----------------------< 224               4.1 | 22 | 1.65                      Ca:8.9  Phos:-  Mg:-               -|      |-        LFTs:  ------|-|-----             -|      |-  03-20 @ 07:07                    11.8  CBC: 12.65>)-------(<201                     37.9                 144 | 115 | 50    CMP:  ----------------------< 129               3.8 | 24 | 1.39                      Ca:8.6  Phos:-  Mg:-               -|      |-        LFTs:  ------|-|-----             -|      |-      Current Inpatient Medications:  acetaminophen     Tablet .. 650 milliGRAM(s) Oral every 6 hours PRN  atorvastatin 40 milliGRAM(s) Oral at bedtime  dextrose 5% + sodium chloride 0.9%. 1000 milliLiter(s) (75 mL/Hr) IV Continuous <Continuous>  dextrose 5%. 1000 milliLiter(s) (50 mL/Hr) IV Continuous <Continuous>  dextrose 5%. 1000 milliLiter(s) (100 mL/Hr) IV Continuous <Continuous>  dextrose 50% Injectable 25 Gram(s) IV Push once  dextrose 50% Injectable 12.5 Gram(s) IV Push once  dextrose 50% Injectable 25 Gram(s) IV Push once  dextrose Oral Gel 15 Gram(s) Oral once PRN  diltiazem    milliGRAM(s) Oral daily  diltiazem Injectable 10 milliGRAM(s) IV Push every 8 hours  glucagon  Injectable 1 milliGRAM(s) IntraMuscular once  insulin glargine Injectable (LANTUS) 10 Unit(s) SubCutaneous at bedtime  insulin lispro (ADMELOG) corrective regimen sliding scale   SubCutaneous three times a day before meals  insulin lispro (ADMELOG) corrective regimen sliding scale   SubCutaneous at bedtime  lidocaine   4% Patch 1 Patch Transdermal daily  meclizine 25 milliGRAM(s) Oral every 8 hours PRN  melatonin 3 milliGRAM(s) Oral at bedtime  metoclopramide Injectable 10 milliGRAM(s) IV Push three times a day PRN  metoprolol succinate ER 50 milliGRAM(s) Oral daily  ondansetron Injectable 4 milliGRAM(s) IV Push every 8 hours PRN  oxyCODONE    IR 5 milliGRAM(s) Oral every 6 hours PRN  piperacillin/tazobactam IVPB.. 3.375 Gram(s) IV Intermittent every 8 hours

## 2024-03-21 NOTE — CHART NOTE - NSCHARTNOTEFT_GEN_A_CORE
Clinical Nutrition PN Follow Up Note    *89 y/o F w/ PMH of HTN, dyslipidemia, PPM, paroxysmal a-fib (on coumadin), TIAs, DM2, p/w aphasia. Patient states she finished her dinner and was feeling like her normal self, and then around 6pm her speech became abnormal when speaking to her . States her speech was coming out garbled, and this lasted for 10 minutes before resolving spontaneously. States she was able to walk herself to the bathroom and back after the episode. Denies any facial droop, weakness in arms / legs, sensory deficits, dysphadia, CP, SOB, nausea, vomiting, abdominal pain     *3/13: Admit dx  TIA. Visited pt at bedside, w/ one-to-one; RD obtained bedscale wt of 208# 3/13. Pt reports that she's had some weight loss in the past few weeks, she doesn't know how much, but says it was "not on purpose." NFPE reveals muscle wasting, fat wasting. PO intake estimated < 75% ENN > one month. Pt on warfarin. Pt w/ T2DM, HgbA1c of 7.5%, POCTs elevated. A target glucose range of 140-180 mg/dL is recommended for most critically ill and non-critically ill patients. At home, pt on glargine, Januvia. Has a CGM, Freestyle Pelon. Pt on 15U lantus at , corrective and nutritional bolus. Has upper and lower dentures. Seen by SLP - recommends regular texture diet, thin liquids. Suggest liberalize diet to regular to maximize intake    *current status: Pt began having diarrhea and dark emesis 3/16, CT revealed SBO - NGT to LWS initiated. Gastrograffin challenge performed, contrast in colon - NGT removed 3/19. Placed on CLD 3/19 however continues with poor appetite as per surgical note 3/20. CT 3/20 revealed pneumatosis w/ portal venous gas concerned for obstruction and ischemia, NGT placed. As per surgery note 3/20, plan for diagnostic laparoscopy Saturday if pt does not improve. Rapid response called 3/20 for AMS. 3/21 pt pulled NGT, now repositioned - 2.8L output overnight; RD noted 450mL output this am. CLD d/c'd, plan to initiate PPN today. PPN is not a long term nutrition intervention as it does not provide full estimated nutrient needs. Would only rec'd PPN x 7 days, if suspected to remain w/o bowel function > 7 days will need PICC line placed for TPN. Monitor closely for refeeding syndrome - please obtain BMP, Mg, and Phos levels. Monitor and replete K, Phos, Mg prn if begins to downtrend, especially if IV dextrose started. Recommend to check refeeding labs BID x 2-3 days upon initiation and then will reevaluate. Consider KPhos suppl BID in effort to supplement low lytes prior to initiating nutrition support.     *Plan to initiate PPN 3/21 2/2 SBO and ischemia, intolerance of PO intake.     *new pertinent meds: ativan, eliquis (now on hold), lipitor (not received), cardizem, 10 U Lantus, 5 U Admelog, metoprolol, phytonadione IVPB, zosyn, oxycodone, D5 1000mL    *labs reviewed: Na and K WNL. No Phos or Mg levels available - please obtain Mg and Phos labs daily as per PN protocol. Will start all lytes low in PN bag and adjust based on tomorrow's labs. Monitor closely for refeeding syndrome - please obtain BMP, Mg, and Phos levels. Monitor and replete K, Phos, Mg prn if begins to downtrend, especially if IV dextrose started. Recommend to check refeeding labs BID x 2-3 days upon initiation and then will reevaluate. Consider KPhos suppl BID in effort to supplement low lytes prior to initiating nutrition support. POCTs x 24 hrs elevated 245, 168, 198, 218 - will start PN w/o insulin and adjust based on tomorrow's labs. POCT required q6hrs to monitor glycemic control; should be maintained between 140-180 mg/dL. TBili WNL to add trace elements to PN bag. Corrected Ca on higher end of normal, Do NOT supplement calcium outside of PN bag at this time.   03-21    139  |  104  |  59<H>  ----------------------------<  290<H>  4.4   |  27  |  2.86<H>    Ca    8.5      21 Mar 2024 07:06    TPro  6.8  /  Alb  2.0<L>  /  TBili  0.8  /  DBili  x   /  AST  28  /  ALT  13  /  AlkPhos  81  03-21    BMI: BMI (kg/m2): 32 (03-17-24 @ 21:35)  HbA1c: A1C with Estimated Average Glucose Result: 7.4 % (03-13-24 @ 06:46)    Glucose: POCT Blood Glucose.: 245 mg/dL (03-21-24 @ 09:05)    BP: 133/58 (03-21-24 @ 08:17) (114/45 - 162/56)Vital Signs Last 24 Hrs  T(C): 37.9 (03-21-24 @ 06:18), Max: 37.9 (03-21-24 @ 06:18)  T(F): 100.2 (03-21-24 @ 06:18), Max: 100.2 (03-21-24 @ 06:18)  HR: 88 (03-21-24 @ 08:17) (78 - 102)  BP: 133/58 (03-21-24 @ 08:17) (117/59 - 146/83)  BP(mean): 74 (03-20-24 @ 14:10) (71 - 74)  RR: 17 (03-21-24 @ 08:17) (17 - 20)  SpO2: 100% (03-21-24 @ 08:17) (95% - 100%)    Lipid Panel: Date/Time: 03-12-24 @ 07:27  Cholesterol, Serum: 123  LDL Cholesterol Calculated: 56  HDL Cholesterol, Serum: 50  Triglycerides, Serum: 91    POCT Blood Glucose.: 245 mg/dL (03-21-24 @ 09:05)  POCT Blood Glucose.: 168 mg/dL (03-20-24 @ 22:43)  POCT Blood Glucose.: 198 mg/dL (03-20-24 @ 19:18)  POCT Blood Glucose.: 218 mg/dL (03-20-24 @ 17:03)  POCT Blood Glucose.: 165 mg/dL (03-20-24 @ 12:02)    *I&O's Detail    20 Mar 2024 07:01  -  21 Mar 2024 07:00  --------------------------------------------------------  IN:  Total IN: 0 mL    OUT:    Nasogastric/Oral tube (mL): 2800 mL  Total OUT: 2800 mL    Total NET: -2800 mL    * fluid status: negative however no input doc'd ***Strict I&O's are rec'd when pt is on PN as per protocol***  *BM (+) on 3/18.  fecal incontinence noted. pt not on bowel regimen.  *edema: none doc'd    *malnutrition: Pt now meets criteria for severe protein-calorie malnutrition in context of acute disease r/t inability to consume sufficient energy/protein 2/2 SBO AEB ENN <50% ENN, moerate muscle wasting, fat wasting,     Estimated Needs: based on 94 Kg (RD bedscale wt from 3/13)  Calories: 1880 - 2350Kcal (20-25 Kcal/Kg)  Protein: 122 - 141 g (1.3-1.5g/Kg)  Fluids:  1880 - 2350 mL (20-25mL/Kg)    Diet, NPO (03-20-24 @ 17:04) [Active]    Weight History:  Daily   Height (cm): 160 (03-11-24 @ 19:24)  Weight (kg): 81.9 (03-17-24 @ 21:35)  BMI (kg/m2): 32 (03-17-24 @ 21:35)  BSA (m2): 1.85 (03-17-24 @ 21:35)  IBW: 115#    TPN/PPN Recommendations: via periphoral venous access  Total Volume: 2000  x 24 hours  80 g  Amino Acids  100 g Dextrose  0 g Lipids 20%  121 mEq Sodium Chloride  6 mEq Sodium Acetate  20 mmol Sodium Phosphate  23 mEq Potassium Chloride  17 mEq Potassium Acetate  0 mmol Potassium Phosphate  0 mEq Calcium Gluconate (supplement outside of bag)  8 mEq Magnesium Sulfate  200 mg Thiamine  1 ml Trace Elements  10 ml MVI    Total Calories       660     (Meets    35%  of  Estimated Energy needs  and    66%  Protein needs)   (osmolarity ~865)    Additional Recommendations:    1) Daily weights  2) Strict I & O's  3) Daily lyte checks including magnesium and phos  4) Weekly triglycerides/LFT checks  5) POCT q6hrs; maintain 140-180mg/dL  6) if on PN > 6 days, consider placing PICC line to meet 100% of nutr needs  7) ADAT to CLD to FLD to low fiber as medically feasible  8) Monitor closely for refeeding syndrome - please obtain BMP, Mg, and Phos levels. Monitor and replete K, Phos, Mg prn if begins to downtrend, especially if IV dextrose started. Check refeeding labs BID x 2-3 days upon initiation and then will reevaluate. Consider KPhos suppl BID in effort to supplement low lytes prior to initiating nutrition support.   9) Confirm goals of care regarding nutrition support     *will continue to monitor and adjust PN prn*  Yessenia Escobedo MS, RDN, [phone] Clinical Nutrition PN Follow Up Note    *89 y/o F w/ PMH of HTN, dyslipidemia, PPM, paroxysmal a-fib (on coumadin), TIAs, DM2, p/w aphasia. Patient states she finished her dinner and was feeling like her normal self, and then around 6pm her speech became abnormal when speaking to her . States her speech was coming out garbled, and this lasted for 10 minutes before resolving spontaneously. States she was able to walk herself to the bathroom and back after the episode. Denies any facial droop, weakness in arms / legs, sensory deficits, dysphadia, CP, SOB, nausea, vomiting, abdominal pain     *Plan to initiate PPN 3/21 2/2 SBO and ischemia, intolerance of PO intake.     *3/13: Admit dx  TIA. Visited pt at bedside, w/ one-to-one; RD obtained bedscale wt of 208# 3/13. Pt reports that she's had some weight loss in the past few weeks, she doesn't know how much, but says it was "not on purpose." NFPE reveals muscle wasting, fat wasting. PO intake estimated < 75% ENN > one month. Pt on warfarin. Pt w/ T2DM, HgbA1c of 7.5%, POCTs elevated. A target glucose range of 140-180 mg/dL is recommended for most critically ill and non-critically ill patients. At home, pt on glargine, Januvia. Has a CGM, Freestyle Pelon. Pt on 15U lantus at , corrective and nutritional bolus. Has upper and lower dentures. Seen by SLP - recommends regular texture diet, thin liquids. Suggest liberalize diet to regular to maximize intake    *current status: Pt began having diarrhea and dark emesis 3/15, C.diff negative. CT revealed SBO - NGT to LWS initiated; made NPO. Gastrograffin challenge performed, contrast in colon - NGT removed 3/19. Placed on CLD 3/19 however continues with poor appetite as per surgical note 3/20. CT 3/20 revealed pneumatosis w/ portal venous gas concerned for obstruction and ischemia, NGT placed to LWS. As per surgery note 3/20, plan for diagnostic laparoscopy Saturday if pt does not improve. Rapid response called 3/20 for AMS. 3/21 pt pulled NGT, now repositioned - 2.8L output overnight; RD noted 450mL output this am. Made NPO and plan to initiate PPN today. PPN is not a long term nutrition intervention as it does not provide full estimated nutrient needs. Would only rec'd PPN x 7 days, if suspected to remain w/o bowel function > 7 days will need PICC line placed for TPN. Recommend palliative care consult to confirm goals of care regarding nutrition support. Monitor closely for refeeding syndrome - please obtain BMP, Mg, and Phos levels. Monitor and replete K, Phos, Mg prn if begins to downtrend, especially if IV dextrose started. Recommend to check refeeding labs BID x 2-3 days upon initiation and then will reevaluate. Consider supplementing low lytes prior to initiating nutrition support. Will add 200mg of thiamine and MVI/MIN into PN bag daily. Please see additional recommendations below.     *pertinent meds: ativan, lipitor (not received), cardizem, Lantus (10 units HS), Admelog (5 units x 24 hours), metoprolol, phytonadione IVPB, abx, tramadol, oxycodone, D5 + IVF    *labs reviewed: Na and K WNL. No Phos or Mg levels available - please obtain Mg and Phos labs daily as per PN protocol. Will start all lytes low in PN bag and adjust based on tomorrow's labs. Monitor closely for refeeding syndrome - please obtain BMP, Mg, and Phos levels. Monitor and replete K, Phos, Mg prn if begins to downtrend, especially if IV dextrose started. Recommend to check refeeding labs BID x 2-3 days upon initiation and then will reevaluate. Consider supplementing low lytes prior to initiating nutrition support. POCTs x 24 hrs elevated 245, 168, 198, 218 - will start PN w/o insulin and adjust based on tomorrow's labs. POCT required q6hrs to monitor glycemic control; should be maintained between 140-180 mg/dL. TBili WNL to add trace elements to PN bag. Corrected Ca on higher end of normal, Do NOT supplement calcium outside of PN bag at this time. TG WNL (91), will add 75g of lipids daily.  03-21    139  |  104  |  59<H>  ----------------------------<  290<H>  4.4   |  27  |  2.86<H>    Ca    8.5      21 Mar 2024 07:06    TPro  6.8  /  Alb  2.0<L>  /  TBili  0.8  /  DBili  x   /  AST  28  /  ALT  13  /  AlkPhos  81  03-21    BMI: BMI (kg/m2): 32 (03-17-24 @ 21:35)  HbA1c: A1C with Estimated Average Glucose Result: 7.4 % (03-13-24 @ 06:46)    Glucose: POCT Blood Glucose.: 245 mg/dL (03-21-24 @ 09:05)    BP: 133/58 (03-21-24 @ 08:17) (114/45 - 162/56)Vital Signs Last 24 Hrs  T(C): 37.9 (03-21-24 @ 06:18), Max: 37.9 (03-21-24 @ 06:18)  T(F): 100.2 (03-21-24 @ 06:18), Max: 100.2 (03-21-24 @ 06:18)  HR: 88 (03-21-24 @ 08:17) (78 - 102)  BP: 133/58 (03-21-24 @ 08:17) (117/59 - 146/83)  BP(mean): 74 (03-20-24 @ 14:10) (71 - 74)  RR: 17 (03-21-24 @ 08:17) (17 - 20)  SpO2: 100% (03-21-24 @ 08:17) (95% - 100%)    Lipid Panel: Date/Time: 03-12-24 @ 07:27  Cholesterol, Serum: 123  LDL Cholesterol Calculated: 56  HDL Cholesterol, Serum: 50  Triglycerides, Serum: 91    POCT Blood Glucose.: 245 mg/dL (03-21-24 @ 09:05)  POCT Blood Glucose.: 168 mg/dL (03-20-24 @ 22:43)  POCT Blood Glucose.: 198 mg/dL (03-20-24 @ 19:18)  POCT Blood Glucose.: 218 mg/dL (03-20-24 @ 17:03)  POCT Blood Glucose.: 165 mg/dL (03-20-24 @ 12:02)    *I&O's Detail    20 Mar 2024 07:01  -  21 Mar 2024 07:00  --------------------------------------------------------  IN:  Total IN: 0 mL    OUT:    Nasogastric/Oral tube (mL): 2800 mL  Total OUT: 2800 mL    Total NET: -2800 mL  * fluid status: negative however no input doc'd ***Strict I&O's are rec'd when pt is on PN as per protocol***  *BM (+) on 3/18.  fecal incontinence noted. pt not on bowel regimen.  *edema: none doc'd    *malnutrition: Pt now meets criteria for severe protein-calorie malnutrition in context of acute disease r/t inability to consume sufficient energy/protein 2/2 SBO AEB intake <50% ENN, moderate muscle wasting, fat wasting    Estimated Needs: based on 94 Kg (RD bedscale wt from 3/13)  Calories: 1880 - 2350Kcal (20-25 Kcal/Kg)  Protein: 122 - 141 g (1.3-1.5g/Kg)  Fluids:  1880 - 2350 mL (20-25mL/Kg)    Diet, NPO (03-20-24 @ 17:04) [Active]    Weight History:  Daily   Height (cm): 160 (03-11-24 @ 19:24)  Weight (kg): 81.9 (03-17-24 @ 21:35)  BMI (kg/m2): 32 (03-17-24 @ 21:35)  BSA (m2): 1.85 (03-17-24 @ 21:35)  IBW: 115#    TPN/PPN Recommendations: via peripheral venous access  Total Volume: 2000  x 24 hours  80 g  Amino Acids  100 g Dextrose  0 g Lipids 20%  121 mEq Sodium Chloride  6 mEq Sodium Acetate  20 mmol Sodium Phosphate  23 mEq Potassium Chloride  17 mEq Potassium Acetate  0 mmol Potassium Phosphate  0 mEq Calcium Gluconate (supplement outside of bag)  8 mEq Magnesium Sulfate  200 mg Thiamine  1 ml Trace Elements  10 ml MVI    Total Calories       660     (Meets    35%  of  Estimated Energy needs  and    66%  Protein needs)   (osmolarity ~865)    Additional Recommendations:    1) Daily weights  2) Strict I & O's  3) Daily lyte checks including magnesium and phos  4) Weekly triglycerides/LFT checks  5) POCT q6hrs; maintain 140-180mg/dL  6) if on PN > 6 days, consider placing PICC line to meet 100% of nutr needs  7) ADAT to CLD to FLD to low fiber as medically feasible  8) Monitor closely for refeeding syndrome - please obtain BMP, Mg, and Phos levels. Monitor and replete K, Phos, Mg prn if begins to downtrend, especially if IV dextrose started. Check refeeding labs BID x 2-3 days upon initiation and then will reevaluate. Consider KPhos suppl BID in effort to supplement low lytes prior to initiating nutrition support.   9) Confirm goals of care regarding nutrition support     *will continue to monitor and adjust PN prn*  Yessenia Escobedo, MS, RDN, [phone] Clinical Nutrition PN Follow Up Note    *89 y/o F w/ PMH of HTN, dyslipidemia, PPM, paroxysmal a-fib (on coumadin), TIAs, DM2, p/w aphasia. Patient states she finished her dinner and was feeling like her normal self, and then around 6pm her speech became abnormal when speaking to her . States her speech was coming out garbled, and this lasted for 10 minutes before resolving spontaneously. States she was able to walk herself to the bathroom and back after the episode. Denies any facial droop, weakness in arms / legs, sensory deficits, dysphadia, CP, SOB, nausea, vomiting, abdominal pain     *Plan to initiate PPN 3/21 2/2 SBO and ischemia, intolerance of PO intake.     *3/13: Admit dx  TIA. Visited pt at bedside, w/ one-to-one; RD obtained bedscale wt of 208# 3/13. Pt reports that she's had some weight loss in the past few weeks, she doesn't know how much, but says it was "not on purpose." NFPE reveals muscle wasting, fat wasting. PO intake estimated < 75% ENN > one month. Pt on warfarin. Pt w/ T2DM, HgbA1c of 7.5%, POCTs elevated. A target glucose range of 140-180 mg/dL is recommended for most critically ill and non-critically ill patients. At home, pt on glargine, Januvia. Has a CGM, Freestyle Pelon. Pt on 15U lantus at , corrective and nutritional bolus. Has upper and lower dentures. Seen by SLP - recommends regular texture diet, thin liquids. Suggest liberalize diet to regular to maximize intake    *current status: Pt began having diarrhea and dark emesis 3/15, C.diff negative. CT revealed SBO - NGT to LWS initiated; made NPO. Gastrograffin challenge performed, contrast in colon - NGT removed 3/19. Placed on CLD 3/19 however continues with poor appetite as per surgical note 3/20. CT 3/20 revealed pneumatosis w/ portal venous gas concerned for obstruction and ischemia, NGT placed to LWS. As per surgery note 3/20, plan for diagnostic laparoscopy Saturday if pt does not improve. Rapid response called 3/20 for AMS. 3/21 pt pulled NGT, now repositioned - 2.8L output overnight; RD noted 450mL output this am. RD obtained new bed scale wt of 81kg 3/21, large discrepancy with last RD bed scale wt of 94kg 3/13; 81kg appears accurate - will readjust ENN. Made NPO and plan to initiate PPN today. PPN is not a long term nutrition intervention as it does not provide full estimated nutrient needs. Would only rec'd PPN x 7 days, if suspected to remain w/o bowel function > 7 days will need PICC line placed for TPN. Recommend palliative care consult to confirm goals of care regarding nutrition support. Monitor closely for refeeding syndrome - please obtain BMP, Mg, and Phos levels. Monitor and replete K, Phos, Mg prn if begins to downtrend, especially if IV dextrose started. Recommend to check refeeding labs BID x 2-3 days upon initiation and then will reevaluate. Consider supplementing low lytes prior to initiating nutrition support. Will add 200mg of thiamine and MVI/MIN into PN bag daily. Please see additional recommendations below.     *pertinent meds: ativan, lipitor (not received), cardizem, Lantus (10 units HS), Admelog (5 units x 24 hours), metoprolol, phytonadione IVPB, abx, tramadol, oxycodone, D5 + IVF    *labs reviewed: Na and K WNL. No Phos or Mg levels available - please obtain Mg and Phos labs daily as per PN protocol. Will start all lytes low in PN bag and adjust based on tomorrow's labs. Monitor closely for refeeding syndrome - please obtain BMP, Mg, and Phos levels. Monitor and replete K, Phos, Mg prn if begins to downtrend, especially if IV dextrose started. Recommend to check refeeding labs BID x 2-3 days upon initiation and then will reevaluate. Consider supplementing low lytes prior to initiating nutrition support. POCTs x 24 hrs elevated 245, 168, 198, 218 - will start PN w/o insulin and adjust based on tomorrow's labs. POCT required q6hrs to monitor glycemic control; should be maintained between 140-180 mg/dL. TBili WNL to add trace elements to PN bag. Corrected Ca on higher end of normal, Do NOT supplement calcium outside of PN bag at this time. TG WNL (91), will add 75g of lipids daily.  03-21    139  |  104  |  59<H>  ----------------------------<  290<H>  4.4   |  27  |  2.86<H>    Ca    8.5      21 Mar 2024 07:06    TPro  6.8  /  Alb  2.0<L>  /  TBili  0.8  /  DBili  x   /  AST  28  /  ALT  13  /  AlkPhos  81  03-21    BMI: BMI (kg/m2): 32 (03-17-24 @ 21:35)  HbA1c: A1C with Estimated Average Glucose Result: 7.4 % (03-13-24 @ 06:46)    Glucose: POCT Blood Glucose.: 245 mg/dL (03-21-24 @ 09:05)    BP: 133/58 (03-21-24 @ 08:17) (114/45 - 162/56)Vital Signs Last 24 Hrs  T(C): 37.9 (03-21-24 @ 06:18), Max: 37.9 (03-21-24 @ 06:18)  T(F): 100.2 (03-21-24 @ 06:18), Max: 100.2 (03-21-24 @ 06:18)  HR: 88 (03-21-24 @ 08:17) (78 - 102)  BP: 133/58 (03-21-24 @ 08:17) (117/59 - 146/83)  BP(mean): 74 (03-20-24 @ 14:10) (71 - 74)  RR: 17 (03-21-24 @ 08:17) (17 - 20)  SpO2: 100% (03-21-24 @ 08:17) (95% - 100%)    Lipid Panel: Date/Time: 03-12-24 @ 07:27  Cholesterol, Serum: 123  LDL Cholesterol Calculated: 56  HDL Cholesterol, Serum: 50  Triglycerides, Serum: 91    POCT Blood Glucose.: 245 mg/dL (03-21-24 @ 09:05)  POCT Blood Glucose.: 168 mg/dL (03-20-24 @ 22:43)  POCT Blood Glucose.: 198 mg/dL (03-20-24 @ 19:18)  POCT Blood Glucose.: 218 mg/dL (03-20-24 @ 17:03)  POCT Blood Glucose.: 165 mg/dL (03-20-24 @ 12:02)    *I&O's Detail    20 Mar 2024 07:01  -  21 Mar 2024 07:00  --------------------------------------------------------  IN:  Total IN: 0 mL    OUT:    Nasogastric/Oral tube (mL): 2800 mL  Total OUT: 2800 mL    Total NET: -2800 mL  * fluid status: negative however no input doc'd ***Strict I&O's are rec'd when pt is on PN as per protocol***  *BM (+) on 3/18.  fecal incontinence noted. pt not on bowel regimen.  *edema: none doc'd    *malnutrition: Pt now meets criteria for severe protein-calorie malnutrition in context of acute disease r/t inability to consume sufficient energy/protein 2/2 SBO AEB intake <50% ENN, moderate muscle wasting, fat wasting    Estimated Needs: based on 81 Kg (RD bedscale wt from 3/13)  Calories: 2025 - 2430 Kcal (20-25 Kcal/Kg)  Protein: 113 - 130 g (1.4-1.6g/Kg)  Fluids:  2025 - 2430 mL (20-25mL/Kg)    Diet, NPO (03-20-24 @ 17:04) [Active]    Weight History:  Daily   Height (cm): 160 (03-11-24 @ 19:24)  Weight (kg): 81.9 (03-17-24 @ 21:35)  BMI (kg/m2): 32 (03-17-24 @ 21:35)  BSA (m2): 1.85 (03-17-24 @ 21:35)  IBW: 115#    TPN/PPN Recommendations: via peripheral venous access  Total Volume: 2000  x 24 hours  80 g  Amino Acids  100 g Dextrose  0 g Lipids 20%  121 mEq Sodium Chloride  6 mEq Sodium Acetate  20 mmol Sodium Phosphate  23 mEq Potassium Chloride  17 mEq Potassium Acetate  0 mmol Potassium Phosphate  0 mEq Calcium Gluconate (supplement outside of bag)  8 mEq Magnesium Sulfate  200 mg Thiamine  1 ml Trace Elements  10 ml MVI    Total Calories      1410     (Meets    70%  of  Estimated Energy needs  and    71%  Protein needs)   (osmolarity ~865)    Additional Recommendations:    1) Daily weights  2) Strict I & O's  3) Daily lyte checks including magnesium and phos  4) Weekly triglycerides/LFT checks  5) POCT q6hrs; maintain 140-180mg/dL  6) if on PN > 6 days, consider placing PICC line to meet 100% of nutr needs  7) ADAT to CLD to FLD to low fiber as medically feasible  8) Monitor closely for refeeding syndrome - please obtain BMP, Mg, and Phos levels. Monitor and replete K, Phos, Mg prn if begins to downtrend, especially if IV dextrose started. Check refeeding labs BID x 2-3 days upon initiation and then will reevaluate. Consider KPhos suppl BID in effort to supplement low lytes prior to initiating nutrition support.   9) Confirm goals of care regarding nutrition support     *will continue to monitor and adjust PN prn*  Yessenia Escobedo, MS, RDN, [phone] Clinical Nutrition PN Follow Up Note    *89 y/o F w/ PMH of HTN, dyslipidemia, PPM, paroxysmal a-fib (on coumadin), TIAs, DM2, p/w aphasia. Patient states she finished her dinner and was feeling like her normal self, and then around 6pm her speech became abnormal when speaking to her . States her speech was coming out garbled, and this lasted for 10 minutes before resolving spontaneously. States she was able to walk herself to the bathroom and back after the episode. Denies any facial droop, weakness in arms / legs, sensory deficits, dysphadia, CP, SOB, nausea, vomiting, abdominal pain     *Plan to initiate PPN 3/21 2/2 SBO and ischemia, intolerance of PO intake.     *3/13: Admit dx  TIA. Visited pt at bedside, w/ one-to-one; RD obtained bedscale wt of 208# 3/13. Pt reports that she's had some weight loss in the past few weeks, she doesn't know how much, but says it was "not on purpose." NFPE reveals muscle wasting, fat wasting. PO intake estimated < 75% ENN > one month. Pt on warfarin. Pt w/ T2DM, HgbA1c of 7.5%, POCTs elevated. A target glucose range of 140-180 mg/dL is recommended for most critically ill and non-critically ill patients. At home, pt on glargine, Januvia. Has a CGM, Freestyle Pelon. Pt on 15U lantus at , corrective and nutritional bolus. Has upper and lower dentures. Seen by SLP - recommends regular texture diet, thin liquids. Suggest liberalize diet to regular to maximize intake    *current status: Pt began having diarrhea and dark emesis 3/15, C.diff negative. CT revealed SBO - NGT to LWS initiated; made NPO. Gastrograffin challenge performed, contrast in colon - NGT removed 3/19. Placed on CLD 3/19 however continues with poor appetite as per surgical note 3/20. CT 3/20 revealed pneumatosis w/ portal venous gas concerned for obstruction and ischemia, NGT placed to LWS. As per surgery note 3/20, plan for diagnostic laparoscopy Saturday if pt does not improve. Rapid response called 3/20 for AMS. 3/21 pt pulled NGT, now repositioned - 2.8L output overnight; RD noted 450mL output this am. RD obtained new bed scale wt of 81kg 3/21, large discrepancy with last RD bed scale wt of 94kg 3/13; 81kg appears accurate - will readjust ENN. Made NPO and plan to initiate PPN today. PPN is not a long term nutrition intervention as it does not provide full estimated nutrient needs. Would only rec'd PPN x 7 days, if suspected to remain w/o bowel function > 7 days will need PICC line placed for TPN. Recommend palliative care consult to confirm goals of care regarding nutrition support. Monitor closely for refeeding syndrome - please obtain BMP, Mg, and Phos levels. Monitor and replete K, Phos, Mg prn if begins to downtrend, especially if IV dextrose started. Recommend to check refeeding labs BID x 2-3 days upon initiation and then will reevaluate. Consider supplementing low lytes prior to initiating nutrition support. Will add 200mg of thiamine and MVI/MIN into PN bag daily. Please see additional recommendations below.     *pertinent meds: ativan, lipitor (not received), cardizem, Lantus (10 units HS), Admelog (5 units x 24 hours), metoprolol, phytonadione IVPB, abx, tramadol, oxycodone, D5 + IVF    *labs reviewed: Na and K WNL. No Phos or Mg levels available - please obtain Mg and Phos labs daily as per PN protocol. Will start all lytes low in PN bag and adjust based on tomorrow's labs. Monitor closely for refeeding syndrome - please obtain BMP, Mg, and Phos levels. Monitor and replete K, Phos, Mg prn if begins to downtrend, especially if IV dextrose started. Recommend to check refeeding labs BID x 2-3 days upon initiation and then will reevaluate. Consider supplementing low lytes prior to initiating nutrition support. POCTs x 24 hrs elevated 245, 168, 198, 218 - will start PN w/o insulin and adjust based on tomorrow's labs. POCT required q6hrs to monitor glycemic control; should be maintained between 140-180 mg/dL. TBili WNL to add trace elements to PN bag. Corrected Ca on higher end of normal, Do NOT supplement calcium outside of PN bag at this time. TG WNL (91), will add 75g of lipids daily.  03-21    139  |  104  |  59<H>  ----------------------------<  290<H>  4.4   |  27  |  2.86<H>    Ca    8.5      21 Mar 2024 07:06    TPro  6.8  /  Alb  2.0<L>  /  TBili  0.8  /  DBili  x   /  AST  28  /  ALT  13  /  AlkPhos  81  03-21    BMI: BMI (kg/m2): 32 (03-17-24 @ 21:35)  HbA1c: A1C with Estimated Average Glucose Result: 7.4 % (03-13-24 @ 06:46)    Glucose: POCT Blood Glucose.: 245 mg/dL (03-21-24 @ 09:05)    BP: 133/58 (03-21-24 @ 08:17) (114/45 - 162/56)Vital Signs Last 24 Hrs  T(C): 37.9 (03-21-24 @ 06:18), Max: 37.9 (03-21-24 @ 06:18)  T(F): 100.2 (03-21-24 @ 06:18), Max: 100.2 (03-21-24 @ 06:18)  HR: 88 (03-21-24 @ 08:17) (78 - 102)  BP: 133/58 (03-21-24 @ 08:17) (117/59 - 146/83)  BP(mean): 74 (03-20-24 @ 14:10) (71 - 74)  RR: 17 (03-21-24 @ 08:17) (17 - 20)  SpO2: 100% (03-21-24 @ 08:17) (95% - 100%)    Lipid Panel: Date/Time: 03-12-24 @ 07:27  Cholesterol, Serum: 123  LDL Cholesterol Calculated: 56  HDL Cholesterol, Serum: 50  Triglycerides, Serum: 91    POCT Blood Glucose.: 245 mg/dL (03-21-24 @ 09:05)  POCT Blood Glucose.: 168 mg/dL (03-20-24 @ 22:43)  POCT Blood Glucose.: 198 mg/dL (03-20-24 @ 19:18)  POCT Blood Glucose.: 218 mg/dL (03-20-24 @ 17:03)  POCT Blood Glucose.: 165 mg/dL (03-20-24 @ 12:02)    *I&O's Detail    20 Mar 2024 07:01  -  21 Mar 2024 07:00  --------------------------------------------------------  IN:  Total IN: 0 mL    OUT:    Nasogastric/Oral tube (mL): 2800 mL  Total OUT: 2800 mL    Total NET: -2800 mL  * fluid status: negative however no input doc'd ***Strict I&O's are rec'd when pt is on PN as per protocol***  *BM (+) on 3/18.  fecal incontinence noted. pt not on bowel regimen.  *edema: none doc'd    *malnutrition: Pt now meets criteria for severe protein-calorie malnutrition in context of acute disease r/t inability to consume sufficient energy/protein 2/2 SBO AEB intake <50% ENN, moderate muscle wasting, fat wasting    Estimated Needs: based on 81 Kg (RD bedscale wt from 3/13)  Calories: 2025 - 2430 Kcal (20-25 Kcal/Kg)  Protein:  g (1.2-1.5g/Kg)  Fluids:  2025 - 2430 mL (20-25mL/Kg)    Diet, NPO (03-20-24 @ 17:04) [Active]    Weight History:  Daily   Height (cm): 160 (03-11-24 @ 19:24)  Weight (kg): 81.9 (03-17-24 @ 21:35)  BMI (kg/m2): 32 (03-17-24 @ 21:35)  BSA (m2): 1.85 (03-17-24 @ 21:35)  IBW: 115#    PPN Recommendations: via peripheral line  Total Volume: 2000  x 24 hours  80 g  Amino Acids  100 g Dextrose  75 g Lipids 20%  121 mEq Sodium Chloride  6 mEq Sodium Acetate  20 mmol Sodium Phosphate  23 mEq Potassium Chloride  17 mEq Potassium Acetate  0 mmol Potassium Phosphate  0 mEq Calcium Gluconate (supplement outside of bag)  8 mEq Magnesium Sulfate  200 mg Thiamine  1 ml Trace Elements  10 ml MVI    Total Calories      1410     (Meets    70%  of  Estimated Energy needs  and    71%  Protein needs)   (osmolarity ~865)    Additional Recommendations:    1) Daily weights  2) Strict I & O's  3) Daily lyte checks including magnesium and phos  4) Weekly triglycerides/LFT checks  5) POCT q6hrs; maintain 140-180mg/dL  6) if on PN > 6 days, consider placing PICC line to meet 100% of nutr needs  7) ADAT to CLD to FLD to low fiber as medically feasible  8) Monitor closely for refeeding syndrome - please obtain BMP, Mg, and Phos levels. Monitor and replete K, Phos, Mg prn if begins to downtrend, especially if IV dextrose started. Check refeeding labs BID x 2-3 days upon initiation and then will reevaluate. Consider KPhos suppl BID in effort to supplement low lytes prior to initiating nutrition support.   9) Confirm goals of care regarding nutrition support     *will continue to monitor and adjust PN prn*  Yessenia Escobedo, MS, RDN, [phone]  Ragini Molina (Formerly Rajesh), MS, RDN, [phone]  Nutrition  Clinical Nutrition PN Follow Up Note    *89 y/o F w/ PMH of HTN, dyslipidemia, PPM, paroxysmal a-fib (on coumadin), TIAs, DM2, p/w aphasia. Patient states she finished her dinner and was feeling like her normal self, and then around 6pm her speech became abnormal when speaking to her . States her speech was coming out garbled, and this lasted for 10 minutes before resolving spontaneously. States she was able to walk herself to the bathroom and back after the episode. Denies any facial droop, weakness in arms / legs, sensory deficits, dysphadia, CP, SOB, nausea, vomiting, abdominal pain     *Plan to initiate PPN 3/21 2/2 SBO and ischemia, intolerance of PO intake.     *3/13: Admit dx  TIA. Visited pt at bedside, w/ one-to-one; RD obtained bedscale wt of 208# 3/13. Pt reports that she's had some weight loss in the past few weeks, she doesn't know how much, but says it was "not on purpose." NFPE reveals muscle wasting, fat wasting. PO intake estimated < 75% ENN > one month. Pt on warfarin. Pt w/ T2DM, HgbA1c of 7.5%, POCTs elevated. A target glucose range of 140-180 mg/dL is recommended for most critically ill and non-critically ill patients. At home, pt on glargine, Januvia. Has a CGM, Freestyle Pelon. Pt on 15U lantus at , corrective and nutritional bolus. Has upper and lower dentures. Seen by SLP - recommends regular texture diet, thin liquids. Suggest liberalize diet to regular to maximize intake    *current status: Pt began having diarrhea and dark emesis 3/15, C.diff negative. CT revealed SBO - NGT to LWS initiated; made NPO. Gastrograffin challenge performed, contrast in colon - NGT removed 3/19. Placed on CLD 3/19 however continues with poor appetite as per surgical note 3/20. CT 3/20 revealed pneumatosis w/ portal venous gas concerned for obstruction and ischemia, NGT placed to LWS. As per surgery note 3/20, plan for diagnostic laparoscopy Saturday if pt does not improve. Rapid response called 3/20 for AMS. 3/21 pt pulled NGT, now repositioned - 2.8L output overnight; RD noted 450mL output this am. RD obtained new bed scale wt of 81kg 3/21, large discrepancy with last RD bed scale wt of 94kg 3/13; 81kg appears accurate - will readjust ENN. Made NPO and plan to initiate PPN today. PPN is not a long term nutrition intervention as it does not provide full estimated nutrient needs. Would only rec'd PPN x 7 days, if suspected to remain w/o bowel function > 7 days will need PICC line placed for TPN. Recommend palliative care consult to confirm goals of care regarding nutrition support. Monitor closely for refeeding syndrome - please obtain BMP, Mg, and Phos levels. Monitor and replete K, Phos, Mg prn if begins to downtrend, especially if IV dextrose started. Recommend to check refeeding labs BID x 2-3 days upon initiation and then will reevaluate. Consider supplementing low lytes prior to initiating nutrition support. Will add 200mg of thiamine and MVI/MIN into PN bag daily. Please see additional recommendations below.     *pertinent meds: ativan, lipitor (not received), cardizem, Lantus (10 units HS), Admelog (5 units x 24 hours), metoprolol, phytonadione IVPB, abx, tramadol, oxycodone, D5 + IVF    *labs reviewed: Na and K WNL. No Phos or Mg levels available - please obtain Mg and Phos labs daily as per PN protocol. Will start all lytes low in PN bag and adjust based on tomorrow's labs. Monitor closely for refeeding syndrome - please obtain BMP, Mg, and Phos levels. Monitor and replete K, Phos, Mg prn if begins to downtrend, especially if IV dextrose started. Recommend to check refeeding labs BID x 2-3 days upon initiation and then will reevaluate. Consider supplementing low lytes prior to initiating nutrition support. POCTs x 24 hrs elevated 245, 168, 198, 218 - will start PN w/o insulin and adjust based on tomorrow's labs. POCT required q6hrs to monitor glycemic control; should be maintained between 140-180 mg/dL. TBili WNL to add trace elements to PN bag. Corrected Ca on higher end of normal, Do NOT supplement calcium outside of PN bag at this time. TG WNL (91), will add 75g of lipids daily.  03-21    139  |  104  |  59<H>  ----------------------------<  290<H>  4.4   |  27  |  2.86<H>    Ca    8.5      21 Mar 2024 07:06    TPro  6.8  /  Alb  2.0<L>  /  TBili  0.8  /  DBili  x   /  AST  28  /  ALT  13  /  AlkPhos  81  03-21    BMI: BMI (kg/m2): 32 (03-17-24 @ 21:35)  HbA1c: A1C with Estimated Average Glucose Result: 7.4 % (03-13-24 @ 06:46)    Glucose: POCT Blood Glucose.: 245 mg/dL (03-21-24 @ 09:05)    BP: 133/58 (03-21-24 @ 08:17) (114/45 - 162/56)Vital Signs Last 24 Hrs  T(C): 37.9 (03-21-24 @ 06:18), Max: 37.9 (03-21-24 @ 06:18)  T(F): 100.2 (03-21-24 @ 06:18), Max: 100.2 (03-21-24 @ 06:18)  HR: 88 (03-21-24 @ 08:17) (78 - 102)  BP: 133/58 (03-21-24 @ 08:17) (117/59 - 146/83)  BP(mean): 74 (03-20-24 @ 14:10) (71 - 74)  RR: 17 (03-21-24 @ 08:17) (17 - 20)  SpO2: 100% (03-21-24 @ 08:17) (95% - 100%)    Lipid Panel: Date/Time: 03-12-24 @ 07:27  Cholesterol, Serum: 123  LDL Cholesterol Calculated: 56  HDL Cholesterol, Serum: 50  Triglycerides, Serum: 91    POCT Blood Glucose.: 245 mg/dL (03-21-24 @ 09:05)  POCT Blood Glucose.: 168 mg/dL (03-20-24 @ 22:43)  POCT Blood Glucose.: 198 mg/dL (03-20-24 @ 19:18)  POCT Blood Glucose.: 218 mg/dL (03-20-24 @ 17:03)  POCT Blood Glucose.: 165 mg/dL (03-20-24 @ 12:02)    *I&O's Detail    20 Mar 2024 07:01  -  21 Mar 2024 07:00  --------------------------------------------------------  IN:  Total IN: 0 mL    OUT:    Nasogastric/Oral tube (mL): 2800 mL  Total OUT: 2800 mL    Total NET: -2800 mL  * fluid status: negative however no input doc'd ***Strict I&O's are rec'd when pt is on PN as per protocol***  *BM (+) on 3/18.  fecal incontinence noted. pt not on bowel regimen.  *edema: none doc'd    *malnutrition: Pt now meets criteria for severe protein-calorie malnutrition in context of acute disease r/t inability to consume sufficient energy/protein 2/2 SBO AEB intake <50% ENN, moderate muscle wasting, fat wasting    Estimated Needs: based on 81 Kg (RD bedscale wt from 3/13)  Calories: 2025 - 2430 Kcal (20-25 Kcal/Kg)  Protein:  g (1.2-1.5g/Kg)  Fluids:  2025 - 2430 mL (20-25mL/Kg)    Diet, NPO (03-20-24 @ 17:04) [Active]    Weight History:  Daily   Height (cm): 160 (03-11-24 @ 19:24)  Weight (kg): 81.9 (03-17-24 @ 21:35)  BMI (kg/m2): 32 (03-17-24 @ 21:35)  BSA (m2): 1.85 (03-17-24 @ 21:35)  IBW: 115#    PPN Recommendations: via peripheral line  Total Volume: 2200  x 24 hours  90 g  Amino Acids  100 g Dextrose  75 g Lipids 20%  136 mEq Sodium Chloride  6 mEq Sodium Acetate  20 mmol Sodium Phosphate  23 mEq Potassium Chloride  17 mEq Potassium Acetate  0 mmol Potassium Phosphate  0 mEq Calcium Gluconate (supplement outside of bag)  8 mEq Magnesium Sulfate  200 mg Thiamine  1 ml Trace Elements  10 ml MVI    Total Calories      1410     (Meets    70%  of  Estimated Energy needs  and    71%  Protein needs)   (osmolarity ~880)    Additional Recommendations:    1) Daily weights  2) Strict I & O's  3) Daily lyte checks including magnesium and phos  4) Weekly triglycerides/LFT checks  5) POCT q6hrs; maintain 140-180mg/dL  6) if on PN > 6 days, consider placing PICC line to meet 100% of nutr needs  7) ADAT to CLD to FLD to low fiber as medically feasible  8) Monitor closely for refeeding syndrome - please obtain BMP, Mg, and Phos levels. Monitor and replete K, Phos, Mg prn if begins to downtrend, especially if IV dextrose started. Check refeeding labs BID x 2-3 days upon initiation and then will reevaluate. Consider KPhos suppl BID in effort to supplement low lytes prior to initiating nutrition support.   9) Confirm goals of care regarding nutrition support     *will continue to monitor and adjust PN prn*  eYssenia Escobedo, MS, RDN, [phone]  Ragini Molina (Formerly Rajesh), MS, RDN, [phone]  Nutrition  Clinical Nutrition PN Follow Up Note    *89 y/o F w/ PMH of HTN, dyslipidemia, PPM, paroxysmal a-fib (on coumadin), TIAs, DM2, p/w aphasia. Patient states she finished her dinner and was feeling like her normal self, and then around 6pm her speech became abnormal when speaking to her . States her speech was coming out garbled, and this lasted for 10 minutes before resolving spontaneously. States she was able to walk herself to the bathroom and back after the episode. Denies any facial droop, weakness in arms / legs, sensory deficits, dysphadia, CP, SOB, nausea, vomiting, abdominal pain     *Plan to initiate PPN 3/21 2/2 SBO and ischemia, intolerance of PO intake.     *3/13: Admit dx  TIA. Visited pt at bedside, w/ one-to-one; RD obtained bedscale wt of 208# 3/13. Pt reports that she's had some weight loss in the past few weeks, she doesn't know how much, but says it was "not on purpose." NFPE reveals muscle wasting, fat wasting. PO intake estimated < 75% ENN > one month. Pt on warfarin. Pt w/ T2DM, HgbA1c of 7.5%, POCTs elevated. A target glucose range of 140-180 mg/dL is recommended for most critically ill and non-critically ill patients. At home, pt on glargine, Januvia. Has a CGM, Freestyle Pelon. Pt on 15U lantus at , corrective and nutritional bolus. Has upper and lower dentures. Seen by SLP - recommends regular texture diet, thin liquids. Suggest liberalize diet to regular to maximize intake    *current status: Pt began having diarrhea and dark emesis 3/15, C.diff negative. CT revealed SBO - NGT to LWS initiated; made NPO. Gastrograffin challenge performed, contrast in colon - NGT removed 3/19. Placed on CLD 3/19 however continues with poor appetite as per surgical note 3/20. CT 3/20 revealed pneumatosis w/ portal venous gas concerned for obstruction and ischemia, NGT placed to LWS. As per surgery note 3/20, plan for diagnostic laparoscopy Saturday if pt does not improve. Rapid response called 3/20 for AMS. 3/21 pt pulled NGT, now repositioned - 2.8L output overnight; RD noted 450mL output this am. RD obtained new bed scale wt of 81kg 3/21, large discrepancy with last RD bed scale wt of 94kg 3/13; 81kg appears accurate - will readjust ENN. Made NPO and plan to initiate PPN today. PPN is not a long term nutrition intervention as it does not provide full estimated nutrient needs. Would only rec'd PPN x 7 days, if suspected to remain w/o bowel function > 7 days will need PICC line placed for TPN. Recommend palliative care consult to confirm goals of care regarding nutrition support. Monitor closely for refeeding syndrome - please obtain BMP, Mg, and Phos levels. Monitor and replete K, Phos, Mg prn if begins to downtrend, especially if IV dextrose started. Recommend to check refeeding labs BID x 2-3 days upon initiation and then will reevaluate. Consider supplementing low lytes prior to initiating nutrition support. Will add 200mg of thiamine and MVI/MIN into PN bag daily. Please see additional recommendations below.     *pertinent meds: ativan, lipitor (not received), cardizem, Lantus (10 units HS), Admelog (5 units x 24 hours), metoprolol, phytonadione IVPB, abx, tramadol, oxycodone, D5 + IVF    *labs reviewed: Na and K WNL. No Phos or Mg levels available - please obtain Mg and Phos labs daily as per PN protocol. Will start all lytes low in PN bag and adjust based on tomorrow's labs. Monitor closely for refeeding syndrome - please obtain BMP, Mg, and Phos levels. Monitor and replete K, Phos, Mg prn if begins to downtrend, especially if IV dextrose started. Recommend to check refeeding labs BID x 2-3 days upon initiation and then will reevaluate. Consider supplementing low lytes prior to initiating nutrition support. POCTs x 24 hrs elevated 245, 168, 198, 218 - will start PN w/o insulin and adjust based on tomorrow's labs. POCT required q6hrs to monitor glycemic control; should be maintained between 140-180 mg/dL. TBili WNL to add trace elements to PN bag. Corrected Ca on higher end of normal, Do NOT supplement calcium outside of PN bag at this time. TG WNL (91), will add 75g of lipids daily.  03-21    139  |  104  |  59<H>  ----------------------------<  290<H>  4.4   |  27  |  2.86<H>    Ca    8.5      21 Mar 2024 07:06    TPro  6.8  /  Alb  2.0<L>  /  TBili  0.8  /  DBili  x   /  AST  28  /  ALT  13  /  AlkPhos  81  03-21    BMI: BMI (kg/m2): 32 (03-17-24 @ 21:35)  HbA1c: A1C with Estimated Average Glucose Result: 7.4 % (03-13-24 @ 06:46)    Glucose: POCT Blood Glucose.: 245 mg/dL (03-21-24 @ 09:05)    BP: 133/58 (03-21-24 @ 08:17) (114/45 - 162/56)Vital Signs Last 24 Hrs  T(C): 37.9 (03-21-24 @ 06:18), Max: 37.9 (03-21-24 @ 06:18)  T(F): 100.2 (03-21-24 @ 06:18), Max: 100.2 (03-21-24 @ 06:18)  HR: 88 (03-21-24 @ 08:17) (78 - 102)  BP: 133/58 (03-21-24 @ 08:17) (117/59 - 146/83)  BP(mean): 74 (03-20-24 @ 14:10) (71 - 74)  RR: 17 (03-21-24 @ 08:17) (17 - 20)  SpO2: 100% (03-21-24 @ 08:17) (95% - 100%)    Lipid Panel: Date/Time: 03-12-24 @ 07:27  Cholesterol, Serum: 123  LDL Cholesterol Calculated: 56  HDL Cholesterol, Serum: 50  Triglycerides, Serum: 91    POCT Blood Glucose.: 245 mg/dL (03-21-24 @ 09:05)  POCT Blood Glucose.: 168 mg/dL (03-20-24 @ 22:43)  POCT Blood Glucose.: 198 mg/dL (03-20-24 @ 19:18)  POCT Blood Glucose.: 218 mg/dL (03-20-24 @ 17:03)  POCT Blood Glucose.: 165 mg/dL (03-20-24 @ 12:02)    *I&O's Detail    20 Mar 2024 07:01  -  21 Mar 2024 07:00  --------------------------------------------------------  IN:  Total IN: 0 mL    OUT:    Nasogastric/Oral tube (mL): 2800 mL  Total OUT: 2800 mL    Total NET: -2800 mL  * fluid status: negative however no input doc'd ***Strict I&O's are rec'd when pt is on PN as per protocol***  *BM (+) on 3/18.  fecal incontinence noted. pt not on bowel regimen.  *edema: none doc'd    *malnutrition: Pt now meets criteria for severe protein-calorie malnutrition in context of acute disease r/t inability to consume sufficient energy/protein 2/2 SBO AEB intake <50% ENN, moderate muscle wasting, fat wasting    Estimated Needs: based on 81 Kg (RD bedscale wt from 3/13)  Calories: 2025 - 2430 Kcal (20-25 Kcal/Kg)  Protein:  g (1.2-1.5g/Kg)  Fluids:  2025 - 2430 mL (20-25mL/Kg)    Diet, NPO (03-20-24 @ 17:04) [Active]    Weight History:  Daily   Height (cm): 160 (03-11-24 @ 19:24)  Weight (kg): 81.9 (03-17-24 @ 21:35)  BMI (kg/m2): 32 (03-17-24 @ 21:35)  BSA (m2): 1.85 (03-17-24 @ 21:35)  IBW: 115#    PPN Recommendations: via peripheral line  Total Volume: 2200  x 24 hours  90 g  Amino Acids  100 g Dextrose  75 g Lipids 20%  136 mEq Sodium Chloride  6 mEq Sodium Acetate  20 mmol Sodium Phosphate  28 mEq Potassium Chloride  22 mEq Potassium Acetate  0 mmol Potassium Phosphate  0 mEq Calcium Gluconate (CAPS out of PN solution)  8 mEq Magnesium Sulfate  200 mg Thiamine  1 ml Trace Elements  10 ml MVI    Total Calories      1450     (Meets    71%  of  Estimated Energy needs  and    71%  Protein needs)   (osmolarity ~885)    Additional Recommendations:    1) Daily weights  2) Strict I & O's  3) Daily lyte checks including magnesium and phos  4) Weekly triglycerides/LFT checks  5) POCT q6hrs; maintain 140-180mg/dL; will monitor need for insulin into PN bag  6) if on PN > 6 days, consider placing PICC line to meet 100% of nutr needs  7) ADAT to CLD to FLD to low fiber as medically feasible  8) Monitor closely for refeeding syndrome - please obtain BMP, Mg, and Phos levels. Monitor and replete K, Phos, Mg prn if begins to downtrend, especially if IV dextrose started. Check refeeding labs BID x 2-3 days upon initiation and then will reevaluate. Consider supplementing low lytes prior to initiating nutrition support. Will add 200mg of thiamine and MVI/MIN daily into PN bag.  9) Confirm goals of care regarding long-term nutrition support     *will continue to monitor and adjust PN prn*  Yessenia Escobedo, MS, RDN, [phone]  Ragini Molina (Formerly Rajesh), MS, RDN, [phone]  Nutrition

## 2024-03-21 NOTE — CONSULT NOTE ADULT - ASSESSMENT
Pt is a 89y old Female with hx of HTN, dyslipidemia, PPM, paroxysmal a-fib (on coumadin), TIAs, DM2, p/w aphasia. Patient states she finished her dinner and was feeling like her normal self, and then around 6pm her speech became abnormal when speaking to her . States her speech was coming out garbled, and this lasted for 10 minutes before resolving spontaneously. States she was able to walk herself to the bathroom and back after the episode. Denies any facial droop, weakness in arms / legs, sensory deficits, dysphadia, CP, SOB, nausea, vomiting, abdominal pain     1. SBO with evidence of ischemia on CT  -not a surgical candidate  -NPO  -NGT to suction with large bilious output  -IV abx for peritonitis  -lactate trending upwards, GURVINDER worsening  -clinical picture worsening, very poor prognosis  -son wishes to continue supportive care and IV abx at this time, palliative to follow up tomorrow  -MOLST with DNR/DNI on chart    2. Aphasia 2/2 suspected TIA  -CT head and MRI brain negative for stroke  -symptoms resolved  -neuro checks  -on statin  -tele      Process of Care   --Reviewed dx/treatment problems and alignment with Goals of Care       Physical Aspects of Care   --Pain   +moderate- severe abdominal pain  Tylenol PRN  Oxycodone 5 mg PRN  c/w current management     --Bowel Regimen   +SBO with evidence of ischemia  not a surgical candidate  continue supportive care per family  IV abx  serial abd exams  NGT to suction  NPO     --Dyspnea   on 100% NRB  in mild distress    --Nausea Vomiting   denies     --Weakness   PT as tolerated          Psychological and Psychiatric Aspects of Care:    --Grief/ Bereavement: emotional support provided   --Hx of psychiatric dx: none   --Pastoral Care Available PRN        Social Aspects of Care   --SW involved          Cultural Aspects   --Primary Language: English           Goals of Care:  see GOC above          We discussed Palliative Care team being a supportive team when a patient has ongoing illnesses.  We also discussed that it is not an end of life care service, but can help navigate symptoms and emotional support throughout their hospital stay here.           Ethical and Legal Aspects: NA           Capacity- A&Ox3, has simple medical decision making capacity         HCP/Surrogate:  Kareem Hernandes, son [phone]          Code Status: DNR/DNI trial NIV    MOLST: MOLST from 3/21/24 with limitations of DNR/DNI trial NIV    Dispo Plan: TBD          Discussed With: Dr. Adam coordinated with attending and SW and RN            Time Spent: 75 minutes including the care, coordination and counseling of this patient, 50% of which was spent coordinating and counseling.

## 2024-03-21 NOTE — PROGRESS NOTE ADULT - SUBJECTIVE AND OBJECTIVE BOX
HOSPITALIST ATTENDING PROGRESS NOTE    Chart and meds reviewed.      Subjective: Patient seen and examined. Patient was more lethargic overnight and had a rapid response called. Repeat CT abdomen       Additional results/Imaging, I have personally reviewed:    LABS:                            12.2   15.03 )-----------( 234      ( 21 Mar 2024 07:06 )             39.0     03-21    139  |  104  |  59<H>  ----------------------------<  290<H>  4.4   |  27  |  2.86<H>    Ca    8.5      21 Mar 2024 07:06    TPro  6.8  /  Alb  2.0<L>  /  TBili  0.8  /  DBili  x   /  AST  28  /  ALT  13  /  AlkPhos  81  03-21        LIVER FUNCTIONS - ( 21 Mar 2024 07:06 )  Alb: 2.0 g/dL / Pro: 6.8 gm/dL / ALK PHOS: 81 U/L / ALT: 13 U/L / AST: 28 U/L / GGT: x           PT/INR - ( 21 Mar 2024 07:06 )   PT: 20.2 sec;   INR: 1.82 ratio         PTT - ( 21 Mar 2024 07:06 )  PTT:30.6 sec  Urinalysis Basic - ( 21 Mar 2024 07:06 )    Color: x / Appearance: x / SG: x / pH: x  Gluc: 290 mg/dL / Ketone: x  / Bili: x / Urobili: x   Blood: x / Protein: x / Nitrite: x   Leuk Esterase: x / RBC: x / WBC x   Sq Epi: x / Non Sq Epi: x / Bacteria: x      ABG - ( 20 Mar 2024 19:29 )  pH, Arterial: 7.44  pH, Blood: x     /  pCO2: 35    /  pO2: 112   / HCO3: 24    / Base Excess: 0.1   /  SaO2: 98                Lactate, Blood: 2.9 mmol/L (03-21 @ 10:28)  Lactate, Blood: 2.2 mmol/L (03-21 @ 07:06)  Lactate, Blood: 2.0 mmol/L (03-20 @ 19:57)  Lactate, Blood: 1.1 mmol/L (03-20 @ 16:57)        All other systems reviewed and found to be negative with the exception of what has been described above.    MEDICATIONS  (STANDING):  atorvastatin 40 milliGRAM(s) Oral at bedtime  dextrose 5%. 1000 milliLiter(s) (50 mL/Hr) IV Continuous <Continuous>  dextrose 5%. 1000 milliLiter(s) (100 mL/Hr) IV Continuous <Continuous>  dextrose 50% Injectable 25 Gram(s) IV Push once  dextrose 50% Injectable 12.5 Gram(s) IV Push once  dextrose 50% Injectable 25 Gram(s) IV Push once  diltiazem    milliGRAM(s) Oral daily  diltiazem Injectable 10 milliGRAM(s) IV Push every 8 hours  glucagon  Injectable 1 milliGRAM(s) IntraMuscular once  insulin glargine Injectable (LANTUS) 10 Unit(s) SubCutaneous at bedtime  insulin lispro (ADMELOG) corrective regimen sliding scale   SubCutaneous at bedtime  insulin lispro (ADMELOG) corrective regimen sliding scale   SubCutaneous three times a day before meals  lidocaine   4% Patch 1 Patch Transdermal daily  lipid, fat emulsion (Fish Oil and Plant Based) 20% Infusion 0.9158 Gm/kG/Day (31.3 mL/Hr) IV Continuous <Continuous>  melatonin 3 milliGRAM(s) Oral at bedtime  metoprolol succinate ER 50 milliGRAM(s) Oral daily  Parenteral Nutrition - Adult 1 Each (92 mL/Hr) TPN Continuous <Continuous>  piperacillin/tazobactam IVPB.. 3.375 Gram(s) IV Intermittent every 8 hours    MEDICATIONS  (PRN):  acetaminophen     Tablet .. 650 milliGRAM(s) Oral every 6 hours PRN Mild Pain (1 - 3)  dextrose Oral Gel 15 Gram(s) Oral once PRN Blood Glucose LESS THAN 70 milliGRAM(s)/deciliter  meclizine 25 milliGRAM(s) Oral every 8 hours PRN Dizziness  metoclopramide Injectable 10 milliGRAM(s) IV Push three times a day PRN nausea  ondansetron Injectable 4 milliGRAM(s) IV Push every 8 hours PRN Nausea and/or Vomiting  oxyCODONE    IR 5 milliGRAM(s) Oral every 6 hours PRN Moderate Pain (4 - 6)      VITALS:  T(F): 98.9 (03-21-24 @ 15:30), Max: 100.2 (03-21-24 @ 06:18)  HR: 96 (03-21-24 @ 15:30) (88 - 102)  BP: 117/89 (03-21-24 @ 15:30) (117/89 - 146/83)  RR: 18 (03-21-24 @ 15:30) (17 - 18)  SpO2: 97% (03-21-24 @ 15:30) (95% - 100%)  Wt(kg): --    I&O's Summary    20 Mar 2024 07:01  -  21 Mar 2024 07:00  --------------------------------------------------------  IN: 0 mL / OUT: 2800 mL / NET: -2800 mL        CAPILLARY BLOOD GLUCOSE      POCT Blood Glucose.: 221 mg/dL (21 Mar 2024 15:21)  POCT Blood Glucose.: 245 mg/dL (21 Mar 2024 09:05)  POCT Blood Glucose.: 168 mg/dL (20 Mar 2024 22:43)  POCT Blood Glucose.: 198 mg/dL (20 Mar 2024 19:18)  POCT Blood Glucose.: 218 mg/dL (20 Mar 2024 17:03)      PHYSICAL EXAM:  Gen: No acute distress   HEENT:  pupils equal and reactive, EOMI,   NECK:   supple, no carotid bruits, No JVD  CV:  +S1, +S2, regular rate rhythm, no murmurs or rubs  RESP:   lungs clear to auscultation bilaterally, no wheezing, rales, rhonchi, good air entry bilaterally  GI:  abdomen soft, non-tender, non-distended, normal BS, no bruits, no abdominal masses, no palpable masses  MSK:   normal muscle tone, no atrophy, no rigidity, no contractions  EXT:  no clubbing, no cyanosis, no edema, no calf pain, swelling or erythema  VASCULAR:  pulses equal and symmetric in the upper and lower extremities  NEURO:  AAOX3, no focal neurological deficits, follows all commands, able to move extremities spontaneously  SKIN:  no ulcers, lesions or rashes      CULTURES:      Telemetry, personally reviewed HOSPITALIST ATTENDING PROGRESS NOTE    Chart and meds reviewed.      Subjective: Patient seen and examined. Patient was more lethargic overnight and had a rapid response called. Repeat CT abdomen showed Persistent small bowel obstruction, now with new small bowel pneumatosis   and portal venous gas worrisome for ischemia. NG tube was replaced. with 2L bilious output. revaluated by General surgery, patient is not a surgical candidate. Continue to decomperess abdomen with NG tube. Will consult palliative care. Poor prognosis.         Additional results/Imaging, I have personally reviewed:    LABS:                            12.2   15.03 )-----------( 234      ( 21 Mar 2024 07:06 )             39.0     03-21    139  |  104  |  59<H>  ----------------------------<  290<H>  4.4   |  27  |  2.86<H>    Ca    8.5      21 Mar 2024 07:06    TPro  6.8  /  Alb  2.0<L>  /  TBili  0.8  /  DBili  x   /  AST  28  /  ALT  13  /  AlkPhos  81  03-21        LIVER FUNCTIONS - ( 21 Mar 2024 07:06 )  Alb: 2.0 g/dL / Pro: 6.8 gm/dL / ALK PHOS: 81 U/L / ALT: 13 U/L / AST: 28 U/L / GGT: x           PT/INR - ( 21 Mar 2024 07:06 )   PT: 20.2 sec;   INR: 1.82 ratio         PTT - ( 21 Mar 2024 07:06 )  PTT:30.6 sec  Urinalysis Basic - ( 21 Mar 2024 07:06 )    Color: x / Appearance: x / SG: x / pH: x  Gluc: 290 mg/dL / Ketone: x  / Bili: x / Urobili: x   Blood: x / Protein: x / Nitrite: x   Leuk Esterase: x / RBC: x / WBC x   Sq Epi: x / Non Sq Epi: x / Bacteria: x      ABG - ( 20 Mar 2024 19:29 )  pH, Arterial: 7.44  pH, Blood: x     /  pCO2: 35    /  pO2: 112   / HCO3: 24    / Base Excess: 0.1   /  SaO2: 98                Lactate, Blood: 2.9 mmol/L (03-21 @ 10:28)  Lactate, Blood: 2.2 mmol/L (03-21 @ 07:06)  Lactate, Blood: 2.0 mmol/L (03-20 @ 19:57)  Lactate, Blood: 1.1 mmol/L (03-20 @ 16:57)        All other systems reviewed and found to be negative with the exception of what has been described above.    MEDICATIONS  (STANDING):  atorvastatin 40 milliGRAM(s) Oral at bedtime  dextrose 5%. 1000 milliLiter(s) (50 mL/Hr) IV Continuous <Continuous>  dextrose 5%. 1000 milliLiter(s) (100 mL/Hr) IV Continuous <Continuous>  dextrose 50% Injectable 25 Gram(s) IV Push once  dextrose 50% Injectable 12.5 Gram(s) IV Push once  dextrose 50% Injectable 25 Gram(s) IV Push once  diltiazem    milliGRAM(s) Oral daily  diltiazem Injectable 10 milliGRAM(s) IV Push every 8 hours  glucagon  Injectable 1 milliGRAM(s) IntraMuscular once  insulin glargine Injectable (LANTUS) 10 Unit(s) SubCutaneous at bedtime  insulin lispro (ADMELOG) corrective regimen sliding scale   SubCutaneous at bedtime  insulin lispro (ADMELOG) corrective regimen sliding scale   SubCutaneous three times a day before meals  lidocaine   4% Patch 1 Patch Transdermal daily  lipid, fat emulsion (Fish Oil and Plant Based) 20% Infusion 0.9158 Gm/kG/Day (31.3 mL/Hr) IV Continuous <Continuous>  melatonin 3 milliGRAM(s) Oral at bedtime  metoprolol succinate ER 50 milliGRAM(s) Oral daily  Parenteral Nutrition - Adult 1 Each (92 mL/Hr) TPN Continuous <Continuous>  piperacillin/tazobactam IVPB.. 3.375 Gram(s) IV Intermittent every 8 hours    MEDICATIONS  (PRN):  acetaminophen     Tablet .. 650 milliGRAM(s) Oral every 6 hours PRN Mild Pain (1 - 3)  dextrose Oral Gel 15 Gram(s) Oral once PRN Blood Glucose LESS THAN 70 milliGRAM(s)/deciliter  meclizine 25 milliGRAM(s) Oral every 8 hours PRN Dizziness  metoclopramide Injectable 10 milliGRAM(s) IV Push three times a day PRN nausea  ondansetron Injectable 4 milliGRAM(s) IV Push every 8 hours PRN Nausea and/or Vomiting  oxyCODONE    IR 5 milliGRAM(s) Oral every 6 hours PRN Moderate Pain (4 - 6)      VITALS:  T(F): 98.9 (03-21-24 @ 15:30), Max: 100.2 (03-21-24 @ 06:18)  HR: 96 (03-21-24 @ 15:30) (88 - 102)  BP: 117/89 (03-21-24 @ 15:30) (117/89 - 146/83)  RR: 18 (03-21-24 @ 15:30) (17 - 18)  SpO2: 97% (03-21-24 @ 15:30) (95% - 100%)  Wt(kg): --    I&O's Summary    20 Mar 2024 07:01  -  21 Mar 2024 07:00  --------------------------------------------------------  IN: 0 mL / OUT: 2800 mL / NET: -2800 mL        CAPILLARY BLOOD GLUCOSE      POCT Blood Glucose.: 221 mg/dL (21 Mar 2024 15:21)  POCT Blood Glucose.: 245 mg/dL (21 Mar 2024 09:05)  POCT Blood Glucose.: 168 mg/dL (20 Mar 2024 22:43)  POCT Blood Glucose.: 198 mg/dL (20 Mar 2024 19:18)  POCT Blood Glucose.: 218 mg/dL (20 Mar 2024 17:03)      PHYSICAL EXAM:  General: moderate distress , A/O x 3  ENT: No gross hearing impairment, Moist mucous membranes, no thrush  Neck: Supple, No JVD  Lungs: Clear to auscultation bilaterally, good air entry, non-labored breathing  Cardio: RRR, S1/S2, No murmur  Abdomen: Soft, Nontender, mild distended;   Extremities: No calf tenderness, No cyanosis, No pitting edema  Psych: lethargic       CULTURES:      Telemetry, personally reviewed

## 2024-03-21 NOTE — CONSULT NOTE ADULT - CONVERSATION DETAILS
Met with patient at bedside along with her son, Kareem, and daughter-in-law to introduce team and discuss GOC.    Patient is drowsy, but arouses to voice and is oriented x3. She was in mild distress at time of my assessment, on 100% NRB.     Son explained that he was informed by surgery team this morning that patient is not a surgical candidate for her SBO as she is too high risk for procedure. He was informed by surgery that there is a chance SBO may resolve on own, so at this point wants to continue IV antibiotics and supportive treatment.    Son, Kareem, states he is patient's HCP. We discussed patient's advanced directives. Son states that patient has a DNR. I explained the need for completion of MOLST form to reflect patient's wishes. Patient confirmed that she would not want to be resuscitated and would not want intubation. A MOLST form was completed reflecting wishes of DNR/DNI trial NIV.    Explained to family that if at any point, patient's comfort is compromised, or she continues to decline despite medical treatment - the focus of care could be switched to a comfort focus only. At this point, son wishes to continue IV antibiotics and supportive care. Palliative to follow up tomorrow. MOLST with DNR/DNI on chart.

## 2024-03-22 NOTE — PROGRESS NOTE ADULT - PROBLEM SELECTOR PLAN 2
Radha  conservative care  The patient is not a surgical candidate  Palliative consult
Radha  conservative care  The patient is not a surgical candidate  Palliative consult

## 2024-03-22 NOTE — PROGRESS NOTE ADULT - ASSESSMENT
Pt is a 89y old Female with hx of HTN, dyslipidemia, PPM, paroxysmal a-fib (on coumadin), TIAs, DM2, p/w aphasia. Patient states she finished her dinner and was feeling like her normal self, and then around 6pm her speech became abnormal when speaking to her . States her speech was coming out garbled, and this lasted for 10 minutes before resolving spontaneously. States she was able to walk herself to the bathroom and back after the episode. Denies any facial droop, weakness in arms / legs, sensory deficits, dysphadia, CP, SOB, nausea, vomiting, abdominal pain     1. SBO with evidence of ischemia on CT  -not a surgical candidate  -NPO  -NGT to suction with large bilious output  -IV abx for peritonitis  -lactate trending upwards, GURVINDER worsening  -clinical picture worsening, very poor prognosis  -son wishes to continue supportive care and IV abx at this time, we discussed transitioning to CMO, family to discuss   -MOLST with DNR/DNI on chart  -IV Hydromorphone 0.2 mg every 4 hours PRN    2. Aphasia 2/2 suspected TIA  -CT head and MRI brain negative for stroke  -symptoms resolved  -neuro checks  -on statin  -tele      Process of Care   --Reviewed dx/treatment problems and alignment with Goals of Care       Physical Aspects of Care   --Pain   +moderate- severe abdominal pain  Tylenol PRN  Oxycodone 5 mg PRN  IV Hydromorphone 0.2 mg every 4 hours PRN    --Bowel Regimen   +SBO with evidence of ischemia  not a surgical candidate  continue supportive care per family  IV abx  serial abd exams  NGT to suction  NPO     --Dyspnea   on 100% NRB  in mild distress    --Nausea Vomiting   denies     --Weakness   PT as tolerated          Psychological and Psychiatric Aspects of Care:    --Grief/ Bereavement: emotional support provided   --Hx of psychiatric dx: none   --Pastoral Care Available PRN        Social Aspects of Care   --SW involved          Cultural Aspects   --Primary Language: English           Goals of Care:  see GOC above          We discussed Palliative Care team being a supportive team when a patient has ongoing illnesses.  We also discussed that it is not an end of life care service, but can help navigate symptoms and emotional support throughout their hospital stay here.           Ethical and Legal Aspects: NA           Capacity- A&Ox3, has simple medical decision making capacity         HCP/Surrogate:  Kareem Hernandes justino [phone]          Code Status: DNR/DNI trial NIV    MOLST: MOLST from 3/21/24 with limitations of DNR/DNI trial NIV    Dispo Plan: TBD          Discussed With: Dr. Rendon & Dr. Pizano          Case coordinated with attending and SW and RN            Time Spent: 60 minutes including the care, coordination and counseling of this patient, 50% of which was spent coordinating and counseling.

## 2024-03-22 NOTE — PROGRESS NOTE ADULT - SUBJECTIVE AND OBJECTIVE BOX
HOSPITALIST ATTENDING PROGRESS NOTE     Chart and meds reviewed. Patient seen and examined     CC: aphasia    Subjective: Pt seen and evaluated. Currently, reports of mild abdominal discomfort. No nausea, vomiting or other acute complaints. Patient and family wants NGT removed. +BMs, per RN.     All other systems and founds to be negative with exception of what has been described above.     PHYSICAL EXAM:  Vital Signs Last 24 Hrs  T(C): 36.4 (22 Mar 2024 08:35), Max: 36.4 (22 Mar 2024 08:35)  T(F): 97.5 (22 Mar 2024 08:35), Max: 97.5 (22 Mar 2024 08:35)  HR: 93 (22 Mar 2024 08:35) (93 - 93)  BP: 154/60 (22 Mar 2024 08:35) (154/60 - 154/60)  RR: 18 (22 Mar 2024 08:35) (18 - 18)  SpO2: 100% (22 Mar 2024 08:35) (100% - 100%)    Parameters below as of 22 Mar 2024 08:35  Patient On (Oxygen Delivery Method): mask, nonrebreather      Daily     Daily     GEN: NAD, +frail. +NRB in place   HEENT: EOMI,  +dry MM  NECK : Soft and supple, no JVD  LUNG: +decreased breath sounds   CVS: S1S2+, RRR, no M/G/R  GI: BS+, +mild diffuse abdominal discomfort on palpation   EXTREMITIES: No peripheral edema  VASCULAR: 2+ peripheral pulses  NEURO: AAOx3, grossly non-focal   SKIN: No rashes    HOME MEDICATIONS:  Home Medications:  atorvastatin 20 mg oral tablet: 1 tab(s) orally once a day (at bedtime) (11 Mar 2024 22:14)  insulin glargine: 15 unit(s) subcutaneous once a day (at bedtime) (11 Mar 2024 22:14)  Januvia 25 mg oral tablet: 1 tab(s) orally once a day (11 Mar 2024 22:17)  Toprol-XL 50 mg oral tablet, extended release: 1 tab(s) orally once a day (at bedtime) (11 Mar 2024 22:15)  Tylenol Extra Strength 500 mg oral tablet: 2 tab(s) orally 3 times a day as needed for back pain (11 Mar 2024 22:17)  warfarin 1 mg oral tablet: 0.5 tab(s) orally once a week on Saturdays ***Pt takes 2mg 6 days a week Sunday through Friday, and 2.5mg on Saturdays*** (11 Mar 2024 22:17)  warfarin 2 mg oral tablet: 1 tab(s) orally once a day (at bedtime) ***Pt takes 2mg 6 days a week Sunday through Friday and 2.5mg on Saturday*** (11 Mar 2024 22:16)      MEDICATIONS  MEDICATIONS  (STANDING):  atorvastatin 40 milliGRAM(s) Oral at bedtime  dextrose 5%. 1000 milliLiter(s) (50 mL/Hr) IV Continuous <Continuous>  dextrose 5%. 1000 milliLiter(s) (100 mL/Hr) IV Continuous <Continuous>  dextrose 50% Injectable 25 Gram(s) IV Push once  dextrose 50% Injectable 12.5 Gram(s) IV Push once  dextrose 50% Injectable 25 Gram(s) IV Push once  diltiazem Injectable 10 milliGRAM(s) IV Push every 8 hours  glucagon  Injectable 1 milliGRAM(s) IntraMuscular once  insulin glargine Injectable (LANTUS) 10 Unit(s) SubCutaneous at bedtime  insulin lispro (ADMELOG) corrective regimen sliding scale   SubCutaneous every 6 hours  lidocaine   4% Patch 1 Patch Transdermal daily  lipid, fat emulsion (Plant Based) 20% Infusion 0.92 Gm/kG/Day (47.1 mL/Hr) IV Continuous <Continuous>  melatonin 3 milliGRAM(s) Oral at bedtime  metoprolol succinate ER 50 milliGRAM(s) Oral daily  Parenteral Nutrition - Adult 1 Each (92 mL/Hr) TPN Continuous <Continuous>  Parenteral Nutrition - Adult 1 Each (96 mL/Hr) TPN Continuous <Continuous>  piperacillin/tazobactam IVPB.. 3.375 Gram(s) IV Intermittent every 8 hours  sodium chloride 0.9%. 1000 milliLiter(s) (75 mL/Hr) IV Continuous <Continuous>      LABS: All Labs Reviewed:                        11.0   22.00 )-----------( 203      ( 22 Mar 2024 04:39 )             34.8     03-22    143  |  107  |  77<H>  ----------------------------<  245<H>  4.1   |  26  |  3.19<H>    Ca    8.0<L>      22 Mar 2024 04:39  Phos  5.0     03-22  Mg     2.3     03-22    TPro  6.4  /  Alb  1.7<L>  /  TBili  0.7  /  DBili  x   /  AST  22  /  ALT  11<L>  /  AlkPhos  73  03-22    PT/INR - ( 22 Mar 2024 04:39 )   PT: 15.7 sec;   INR: 1.40 ratio         PTT - ( 22 Mar 2024 04:39 )  PTT:30.0 sec  ABG - ( 20 Mar 2024 19:29 )  pH, Arterial: 7.44  pH, Blood: x     /  pCO2: 35    /  pO2: 112   / HCO3: 24    / Base Excess: 0.1   /  SaO2: 98                Urinalysis Basic - ( 22 Mar 2024 04:39 )    Color: x / Appearance: x / SG: x / pH: x  Gluc: 245 mg/dL / Ketone: x  / Bili: x / Urobili: x   Blood: x / Protein: x / Nitrite: x   Leuk Esterase: x / RBC: x / WBC x   Sq Epi: x / Non Sq Epi: x / Bacteria: x        Culture - Blood (collected 20 Mar 2024 14:58)  Source: .Blood Blood-Peripheral  Preliminary Report (21 Mar 2024 22:02):    No growth at 24 hours    Culture - Blood (collected 20 Mar 2024 14:57)  Source: .Blood None  Preliminary Report (21 Mar 2024 22:02):    No growth at 24 hours      Blood Culture: 03-20 @ 14:58  Organism --  Gram Stain Blood -- Gram Stain --  Specimen Source .Blood Blood-Peripheral  Culture-Blood --    03-20 @ 14:57  Organism --  Gram Stain Blood -- Gram Stain --  Specimen Source .Blood None  Culture-Blood --      I&O's Detail    CAPILLARY BLOOD GLUCOSE      POCT Blood Glucose.: 238 mg/dL (22 Mar 2024 12:18)  POCT Blood Glucose.: 263 mg/dL (22 Mar 2024 08:05)  POCT Blood Glucose.: 142 mg/dL (22 Mar 2024 00:51)        CARDIOLOGY TESTING   EKG: reviewed   ECHO: reviewed    RADIOLOGY: reviewed

## 2024-03-22 NOTE — CHART NOTE - NSCHARTNOTEFT_GEN_A_CORE
Clinical Nutrition PN Follow Up Note    *89 y/o F w/ PMH of HTN, dyslipidemia, PPM, paroxysmal a-fib (on coumadin), TIAs, DM2, p/w aphasia. Patient states she finished her dinner and was feeling like her normal self, and then around 6pm her speech became abnormal when speaking to her . States her speech was coming out garbled, and this lasted for 10 minutes before resolving spontaneously. States she was able to walk herself to the bathroom and back after the episode. Denies any facial droop, weakness in arms / legs, sensory deficits, dysphadia, CP, SOB, nausea, vomiting, abdominal pain     *Plan to initiate PPN 3/21 2/2 SBO and ischemia, intolerance of PO intake.     *3/13: Admit dx  TIA. Visited pt at bedside, w/ one-to-one; RD obtained bedscale wt of 208# 3/13. Pt reports that she's had some weight loss in the past few weeks, she doesn't know how much, but says it was "not on purpose." NFPE reveals muscle wasting, fat wasting. PO intake estimated < 75% ENN > one month. Pt on warfarin. Pt w/ T2DM, HgbA1c of 7.5%, POCTs elevated. A target glucose range of 140-180 mg/dL is recommended for most critically ill and non-critically ill patients. At home, pt on glargine, Januvia. Has a CGM, Freestyle Pelon. Pt on 15U lantus at , corrective and nutritional bolus. Has upper and lower dentures. Seen by SLP - recommends regular texture diet, thin liquids. Suggest liberalize diet to regular to maximize intake  *3/21: Pt began having diarrhea and dark emesis 3/15, C.diff negative. CT revealed SBO - NGT to LWS initiated; made NPO. Gastrograffin challenge performed, contrast in colon - NGT removed 3/19. Placed on CLD 3/19 however continues with poor appetite as per surgical note 3/20. CT 3/20 revealed pneumatosis w/ portal venous gas concerned for obstruction and ischemia, NGT placed to LWS. As per surgery note 3/20, plan for diagnostic laparoscopy Saturday if pt does not improve. Rapid response called 3/20 for AMS. 3/21 pt pulled NGT, now repositioned - 2.8L output overnight; RD noted 450mL output this am. RD obtained new bed scale wt of 81kg 3/21, large discrepancy with last RD bed scale wt of 94kg 3/13; 81kg appears accurate - will readjust ENN. Made NPO and plan to initiate PPN today. PPN is not a long term nutrition intervention as it does not provide full estimated nutrient needs. Would only rec'd PPN x 7 days, if suspected to remain w/o bowel function > 7 days will need PICC line placed for TPN. Recommend palliative care consult to confirm goals of care regarding nutrition support. Monitor closely for refeeding syndrome - please obtain BMP, Mg, and Phos levels. Monitor and replete K, Phos, Mg prn if begins to downtrend, especially if IV dextrose started. Recommend to check refeeding labs BID x 2-3 days upon initiation and then will reevaluate. Consider supplementing low lytes prior to initiating nutrition support. Will add 200mg of thiamine and MVI/MIN into PN bag daily. Please see additional recommendations below.     *current status: NGT remains in place w/ bilious output. As per surgery yesterday, O2 requirements have increased and lactate trending upwards - NOT considered a surgical candidate as surgery too high risk, no intervention planned at this time. Seen by palliative yesterday, confirmed DNR/DNI and "very poor prognosis" noted, however, son wishes to c/w IV abx and supportive care 2/2 "chance SBO may resolve on own," pall rec'd if pt's comfort is compromised to switch to comfort measures - palliative to f/u today as per note. Will c/w PPN today as d/w Dr. Pizano and currently within [facility], however, as no surgical intervention planned and prognosis poor would STRONGLY consider d/c'ing PN and placing on comfort care 2/2 overall declining medical status which evidenced based studies indicate EN/PN is NOT effective in prolonging survival and improving quality of life. It can also increase risk of aspiration pneumonia as well as other related issues (infection, GI complications, and worsening/ non-healing PI's).     *pertinent meds: ativan, lipitor (not received), cardizem, Lantus (10 units HS), Admelog (7 units x 24 hours), metoprolol, abx in D5, oxycodone, IVF    *labs reviewed: Na high-end of normal and correct Na for hyperglycemia 145, very high-end of normal, will increase total fluid volume to 2300 mL today and continue to monitor fluid status and adjust prn as per labs. K+ WNL. Mg high-end of normal, will keep the same amount in PN bag today and continue to monitor and adjust prn based on tomorrow's labs, if uptrend noted, will decrease in PN bag. Hyperphosphatemia noted, will remove Phos from PN bag today and consider adding back at low level once downtrend noted and consistently WNL. POCTs x 24 hrs variable (142, 159, 221, 245), will keep insulin out of PN bag, if BG remains elevated, will consider adding in - continue to monitor and optimize BG levels between 140-180 mg/dL by medical management outside of PN bag. TBili remains WNL to c/w trace elements in PN bag. Corrected Ca on higher end of normal, Do NOT supplement calcium outside of PN bag at this time. TG WNL (91 on 3/12), will c/w 75g of lipids daily - **will change SMOF lipids to plant-based lipids in PN bag today 2/2 shellfish allergy noted as per EMR chart. Will also increase amino acids in bag to better meet estimated protein needs as osmolarity allows for it.     03-22    143  |  107  |  77<H>  ----------------------------<  245<H>  4.1   |  26  |  3.19<H>    Ca    8.0<L>      22 Mar 2024 04:39  Phos  5.0     03-22  Mg     2.3     03-22    TPro  6.4  /  Alb  1.7<L>  /  TBili  0.7  /  DBili  x   /  AST  22  /  ALT  11<L>  /  AlkPhos  73  03-22    BMI: BMI (kg/m2): 32 (03-17-24 @ 21:35)  HbA1c: A1C with Estimated Average Glucose Result: 7.4 % (03-13-24 @ 06:46)    CAPILLARY BLOOD GLUCOSE  POCT Blood Glucose.: 263 mg/dL (22 Mar 2024 08:05)  POCT Blood Glucose.: 142 mg/dL (22 Mar 2024 00:51)  POCT Blood Glucose.: 159 mg/dL (21 Mar 2024 18:12)  POCT Blood Glucose.: 221 mg/dL (21 Mar 2024 15:21)    BP: 154/60 (03-22-24 @ 08:35) (114/45 - 154/60)Vital Signs Last 24 Hrs  T(C): 36.4 (03-22-24 @ 08:35), Max: 37.2 (03-21-24 @ 15:30)  T(F): 97.5 (03-22-24 @ 08:35), Max: 98.9 (03-21-24 @ 15:30)  HR: 93 (03-22-24 @ 08:35) (93 - 96)  BP: 154/60 (03-22-24 @ 08:35) (117/89 - 154/60)  RR: 18 (03-22-24 @ 08:35) (18 - 18)  SpO2: 100% (03-22-24 @ 08:35) (97% - 100%)    Lipid Panel: Date/Time: 03-12-24 @ 07:27  Cholesterol, Serum: 123  LDL Cholesterol Calculated: 56  HDL Cholesterol, Serum: 50  Triglycerides, Serum: 91    I&O's Detail - None doc'd as per flowsheet, ***Strict I&O's are rec'd when pt is on PN as per protocol***  *fluid status: no I&O's doc'd 3/21-3/22  *BM (+) on 3/21.  small amount, loose, brown, fecal incontinence noted. pt not on bowel regimen.  *edema: 1+ L knee edema doc'd 3/21    *malnutrition: Pt continues to meet criteria for severe protein-calorie malnutrition in context of acute disease r/t inability to consume sufficient energy/protein 2/2 SBO AEB intake <50% ENN, moderate muscle wasting, fat wasting    Estimated Needs: based on 81 Kg (RD bedscale wt from 3/13)  Calories: 2025 - 2430 Kcal (20-25 Kcal/Kg)  Protein:  g (1.2-1.5g/Kg)  Fluids:  2025 - 2430 mL (20-25mL/Kg)    Diet, NPO (03-20-24 @ 17:04) [Active]    Weight History:  Daily   Height (cm): 160 (03-11-24 @ 19:24)  Weight (kg): 81.9 (03-17-24 @ 21:35)  BMI (kg/m2): 32 (03-17-24 @ 21:35)  BSA (m2): 1.85 (03-17-24 @ 21:35)  IBW: 115#    PPN Recommendations: via peripheral line  Total Volume: 2300  x 24 hours  100 g  Amino Acids  100 g Dextrose  75 g Lipids 20%  157 mEq Sodium Chloride  12 mEq Sodium Acetate  0 mmol Sodium Phosphate  28 mEq Potassium Chloride  22 mEq Potassium Acetate  0 mmol Potassium Phosphate  0 mEq Calcium Gluconate (CAPS out of PN solution)  8 mEq Magnesium Sulfate  200 mg Thiamine  1 ml Trace Elements  10 ml MVI    Total Calories      1450     (Meets    71%  of  Estimated Energy needs  and    100%  Protein needs)   (osmolarity ~865)    Additional Recommendations:    1) Daily weights  2) Strict I & O's  3) Daily lyte checks including magnesium and phos  4) Weekly triglycerides/LFT checks  5) POCT q6hrs; maintain 140-180mg/dL  6) if on PN > 6 days, consider placing PICC line to meet 100% of nutr needs  7) ADAT to CLD to FLD to low fiber as medically feasible as per MD orders   8) Palliative following, recommend to re-confirm goals of care regarding nutrition support - consider d/c'ing PN and placing on comfort care     *will continue to monitor and adjust PN prn*  Elaina Montilla RDN Clinical Nutrition PN Follow Up Note    *89 y/o F w/ PMH of HTN, dyslipidemia, PPM, paroxysmal a-fib (on coumadin), TIAs, DM2, p/w aphasia. Patient states she finished her dinner and was feeling like her normal self, and then around 6pm her speech became abnormal when speaking to her . States her speech was coming out garbled, and this lasted for 10 minutes before resolving spontaneously. States she was able to walk herself to the bathroom and back after the episode. Denies any facial droop, weakness in arms / legs, sensory deficits, dysphadia, CP, SOB, nausea, vomiting, abdominal pain     *Plan to initiate PPN 3/21 2/2 SBO and ischemia, intolerance of PO intake.     *3/13: Admit dx  TIA. Visited pt at bedside, w/ one-to-one; RD obtained bedscale wt of 208# 3/13. Pt reports that she's had some weight loss in the past few weeks, she doesn't know how much, but says it was "not on purpose." NFPE reveals muscle wasting, fat wasting. PO intake estimated < 75% ENN > one month. Pt on warfarin. Pt w/ T2DM, HgbA1c of 7.5%, POCTs elevated. A target glucose range of 140-180 mg/dL is recommended for most critically ill and non-critically ill patients. At home, pt on glargine, Januvia. Has a CGM, Freestyle Pelon. Pt on 15U lantus at , corrective and nutritional bolus. Has upper and lower dentures. Seen by SLP - recommends regular texture diet, thin liquids. Suggest liberalize diet to regular to maximize intake  *3/21: Pt began having diarrhea and dark emesis 3/15, C.diff negative. CT revealed SBO - NGT to LWS initiated; made NPO. Gastrograffin challenge performed, contrast in colon - NGT removed 3/19. Placed on CLD 3/19 however continues with poor appetite as per surgical note 3/20. CT 3/20 revealed pneumatosis w/ portal venous gas concerned for obstruction and ischemia, NGT placed to LWS. As per surgery note 3/20, plan for diagnostic laparoscopy Saturday if pt does not improve. Rapid response called 3/20 for AMS. 3/21 pt pulled NGT, now repositioned - 2.8L output overnight; RD noted 450mL output this am. RD obtained new bed scale wt of 81kg 3/21, large discrepancy with last RD bed scale wt of 94kg 3/13; 81kg appears accurate - will readjust ENN. Made NPO and plan to initiate PPN today. PPN is not a long term nutrition intervention as it does not provide full estimated nutrient needs. Would only rec'd PPN x 7 days, if suspected to remain w/o bowel function > 7 days will need PICC line placed for TPN. Recommend palliative care consult to confirm goals of care regarding nutrition support. Monitor closely for refeeding syndrome - please obtain BMP, Mg, and Phos levels. Monitor and replete K, Phos, Mg prn if begins to downtrend, especially if IV dextrose started. Recommend to check refeeding labs BID x 2-3 days upon initiation and then will reevaluate. Consider supplementing low lytes prior to initiating nutrition support. Will add 200mg of thiamine and MVI/MIN into PN bag daily. Please see additional recommendations below.     *current status: NGT remains in place w/ bilious output. As per surgery yesterday, O2 requirements have increased and lactate trending upwards - NOT considered a surgical candidate as surgery too high risk, no intervention planned at this time. Seen by palliative yesterday, confirmed DNR/DNI and "very poor prognosis" noted, however, son wishes to c/w IV abx and supportive care 2/2 "chance SBO may resolve on own," pall rec'd if pt's comfort is compromised to switch to comfort measures - palliative to f/u today as per note. Will c/w PPN today as d/w Dr. Pizano and currently within [facility], however, as no surgical intervention planned and prognosis poor would STRONGLY consider d/c'ing PN and placing on comfort care 2/2 overall declining medical status which evidenced based studies indicate EN/PN is NOT effective in prolonging survival and improving quality of life. It can also increase risk of aspiration pneumonia as well as other related issues (infection, GI complications, and worsening/ non-healing PI's).     *pertinent meds: ativan, lipitor (not received), cardizem, Lantus (10 units HS), Admelog (7 units x 24 hours), metoprolol, abx in D5, oxycodone, IVF    *labs reviewed: Na high-end of normal and correct Na for hyperglycemia 145, very high-end of normal, will increase total fluid volume to 2300 mL today and continue to monitor fluid status and adjust prn as per labs. K+ WNL. Mg high-end of normal, will keep the same amount in PN bag today and continue to monitor and adjust prn based on tomorrow's labs, if uptrend noted, will decrease in PN bag. Hyperphosphatemia noted, will remove Phos from PN bag today and consider adding back at low level once downtrend noted and consistently WNL. POCTs x 24 hrs variable (142, 159, 221, 245), will keep insulin out of PN bag, if BG remains elevated, will consider adding in - continue to monitor and optimize BG levels between 140-180 mg/dL by medical management outside of PN bag. TBili remains WNL to c/w trace elements in PN bag. Corrected Ca on higher end of normal, Do NOT supplement calcium outside of PN bag at this time. New TG available, remains WNL (193 on 3/22), will c/w 75g of lipids daily - **will change SMOF lipids to plant-based lipids in PN bag today 2/2 shellfish allergy noted as per EMR chart. Will also increase amino acids in bag to better meet estimated protein needs as osmolarity allows for it.     03-22    143  |  107  |  77<H>  ----------------------------<  245<H>  4.1   |  26  |  3.19<H>    Ca    8.0<L>      22 Mar 2024 04:39  Phos  5.0     03-22  Mg     2.3     03-22    TPro  6.4  /  Alb  1.7<L>  /  TBili  0.7  /  DBili  x   /  AST  22  /  ALT  11<L>  /  AlkPhos  73  03-22    BMI: BMI (kg/m2): 32 (03-17-24 @ 21:35)  HbA1c: A1C with Estimated Average Glucose Result: 7.4 % (03-13-24 @ 06:46)    CAPILLARY BLOOD GLUCOSE  POCT Blood Glucose.: 263 mg/dL (22 Mar 2024 08:05)  POCT Blood Glucose.: 142 mg/dL (22 Mar 2024 00:51)  POCT Blood Glucose.: 159 mg/dL (21 Mar 2024 18:12)  POCT Blood Glucose.: 221 mg/dL (21 Mar 2024 15:21)    BP: 154/60 (03-22-24 @ 08:35) (114/45 - 154/60)Vital Signs Last 24 Hrs  T(C): 36.4 (03-22-24 @ 08:35), Max: 37.2 (03-21-24 @ 15:30)  T(F): 97.5 (03-22-24 @ 08:35), Max: 98.9 (03-21-24 @ 15:30)  HR: 93 (03-22-24 @ 08:35) (93 - 96)  BP: 154/60 (03-22-24 @ 08:35) (117/89 - 154/60)  RR: 18 (03-22-24 @ 08:35) (18 - 18)  SpO2: 100% (03-22-24 @ 08:35) (97% - 100%)    Lipid Panel: Date/Time: 03-12-24 @ 07:27  Cholesterol, Serum: 123  LDL Cholesterol Calculated: 56  HDL Cholesterol, Serum: 50  Triglycerides, Serum: 91    I&O's Detail - None doc'd as per flowsheet, ***Strict I&O's are rec'd when pt is on PN as per protocol***  *fluid status: no I&O's doc'd 3/21-3/22  *BM (+) on 3/21.  small amount, loose, brown, fecal incontinence noted. pt not on bowel regimen.  *edema: 1+ L knee edema doc'd 3/21    *malnutrition: Pt continues to meet criteria for severe protein-calorie malnutrition in context of acute disease r/t inability to consume sufficient energy/protein 2/2 SBO AEB intake <50% ENN, moderate muscle wasting, fat wasting    Estimated Needs: based on 81 Kg (RD bedscale wt from 3/13)  Calories: 2025 - 2430 Kcal (20-25 Kcal/Kg)  Protein:  g (1.2-1.5g/Kg)  Fluids:  2025 - 2430 mL (20-25mL/Kg)    Diet, NPO (03-20-24 @ 17:04) [Active]    Weight History:  Daily   Height (cm): 160 (03-11-24 @ 19:24)  Weight (kg): 81.9 (03-17-24 @ 21:35)  BMI (kg/m2): 32 (03-17-24 @ 21:35)  BSA (m2): 1.85 (03-17-24 @ 21:35)  IBW: 115#    PPN Recommendations: via peripheral line  Total Volume: 2300  x 24 hours  100 g  Amino Acids  100 g Dextrose  75 g Lipids 20% (**plant-based lipids 2/2 shellfish allergy noted as per EMR)  157 mEq Sodium Chloride  12 mEq Sodium Acetate  0 mmol Sodium Phosphate  28 mEq Potassium Chloride  22 mEq Potassium Acetate  0 mmol Potassium Phosphate  0 mEq Calcium Gluconate (CAPS out of PN solution)  8 mEq Magnesium Sulfate  200 mg Thiamine  1 ml Trace Elements  10 ml MVI    Total Calories      1490     (Meets    71%  of  Estimated Energy needs  and    100%  Protein needs)   (osmolarity ~865)    Additional Recommendations:    1) Daily weights  2) Strict I & O's  3) Daily lyte checks including magnesium and phos  4) Weekly triglycerides/LFT checks  5) POCT q6hrs; maintain 140-180mg/dL  6) if on PN > 6 days, consider placing PICC line to meet 100% of nutr needs  7) ADAT to CLD to FLD to low fiber as medically feasible as per MD orders   8) Palliative following, recommend to re-confirm goals of care regarding nutrition support - consider d/c'ing PN and placing on comfort care     *will continue to monitor and adjust PN prn*  Elaina Montilla RDN

## 2024-03-22 NOTE — PROGRESS NOTE ADULT - SUBJECTIVE AND OBJECTIVE BOX
Pt seen at bedside, lethargic  Having Bms as per nursing      Vitals:  T(C): 37.2 (03-21 @ 15:30), Max: 37.9 (03-21 @ 06:18)  HR: 96 (03-21 @ 15:30) (88 - 96)  BP: 117/89 (03-21 @ 15:30) (117/89 - 140/43)  RR: 18 (03-21 @ 15:30) (17 - 18)  SpO2: 97% (03-21 @ 15:30) (95% - 100%)    03-20 @ 07:01  -  03-21 @ 07:00  --------------------------------------------------------  IN:  Total IN: 0 mL    OUT:    Nasogastric/Oral tube (mL): 2800 mL  Total OUT: 2800 mL    Total NET: -2800 mL      Physical Exam:  General: AAOx0,NAD  Chest: Normal respiratory effort on face mask  Heart: RRR  Abdomen: Soft, distended, tender  Neuro/Psych: No localized deficits.   Skin: Normal, no rashes, no lesions noted.     03-21 @ 07:06                    12.2  CBC: 15.03>)-------(<234                     39.0                 139 | 104 | 59    CMP:  ----------------------< 290               4.4 | 27 | 2.86                      Ca:8.5  Phos:-  Mg:-               0.8|      |28        LFTs:  ------|81|-----             -|      |-  03-20 @ 19:57                    13.1  CBC: 15.26>)-------(<255                     42.5                 140 | 107 | 53    CMP:  ----------------------< 204               4.3 | 26 | 2.03                      Ca:9.0  Phos:-  Mg:-               0.4|      |36        LFTs:  ------|99|-----             -|      |-  03-20 @ 16:57                    12.8  CBC: 13.73>)-------(<236                     41.1                 139 | 110 | 52    CMP:  ----------------------< 224               4.1 | 22 | 1.65                      Ca:8.9  Phos:-  Mg:-               -|      |-        LFTs:  ------|-|-----             -|      |-      Culture - Blood (collected 03-20-24 @ 14:58)  Source: .Blood Blood-Peripheral  Preliminary Report (03-21-24 @ 22:02):    No growth at 24 hours    Culture - Blood (collected 03-20-24 @ 14:57)  Source: .Blood None  Preliminary Report (03-21-24 @ 22:02):    No growth at 24 hours      Current Inpatient Medications:  acetaminophen     Tablet .. 650 milliGRAM(s) Oral every 6 hours PRN  atorvastatin 40 milliGRAM(s) Oral at bedtime  dextrose 5%. 1000 milliLiter(s) (100 mL/Hr) IV Continuous <Continuous>  dextrose 5%. 1000 milliLiter(s) (50 mL/Hr) IV Continuous <Continuous>  dextrose 50% Injectable 25 Gram(s) IV Push once  dextrose 50% Injectable 12.5 Gram(s) IV Push once  dextrose 50% Injectable 25 Gram(s) IV Push once  dextrose Oral Gel 15 Gram(s) Oral once PRN  diltiazem    milliGRAM(s) Oral daily  diltiazem Injectable 10 milliGRAM(s) IV Push every 8 hours  glucagon  Injectable 1 milliGRAM(s) IntraMuscular once  HYDROmorphone  Injectable 0.2 milliGRAM(s) IV Push every 4 hours PRN  insulin glargine Injectable (LANTUS) 10 Unit(s) SubCutaneous at bedtime  insulin lispro (ADMELOG) corrective regimen sliding scale   SubCutaneous at bedtime  insulin lispro (ADMELOG) corrective regimen sliding scale   SubCutaneous three times a day before meals  lidocaine   4% Patch 1 Patch Transdermal daily  lipid, fat emulsion (Fish Oil and Plant Based) 20% Infusion 0.9158 Gm/kG/Day (31.3 mL/Hr) IV Continuous <Continuous>  meclizine 25 milliGRAM(s) Oral every 8 hours PRN  melatonin 3 milliGRAM(s) Oral at bedtime  metoclopramide Injectable 10 milliGRAM(s) IV Push three times a day PRN  metoprolol succinate ER 50 milliGRAM(s) Oral daily  ondansetron Injectable 4 milliGRAM(s) IV Push every 8 hours PRN  oxyCODONE    IR 5 milliGRAM(s) Oral every 6 hours PRN  Parenteral Nutrition - Adult 1 Each (92 mL/Hr) TPN Continuous <Continuous>  piperacillin/tazobactam IVPB.. 3.375 Gram(s) IV Intermittent every 8 hours          < from: CT Abdomen and Pelvis w/ Oral Cont (03.20.24 @ 15:37) >  IMPRESSION:  Persistent small bowel obstruction, now with new small bowel pneumatosis   and portal venous gas worrisome for ischemia.    < end of copied text >  < from: CT Abdomen and Pelvis w/ Oral Cont (03.20.24 @ 15:37) >  IMPRESSION:  Persistent small bowel obstruction, now with new small bowel pneumatosis   and portal venous gas worrisome for ischemia.    < end of copied text >    PE  Respirations- labored on high O2 NRB mask.  Abd- soft, non distended but with increased pain and rebound.

## 2024-03-22 NOTE — PROGRESS NOTE ADULT - SUBJECTIVE AND OBJECTIVE BOX
HPI: Pt is a 89y old Female with hx of HTN, dyslipidemia, PPM, paroxysmal a-fib (on coumadin), TIAs, DM2, p/w aphasia. Patient states she finished her dinner and was feeling like her normal self, and then around 6pm her speech became abnormal when speaking to her . States her speech was coming out garbled, and this lasted for 10 minutes before resolving spontaneously. States she was able to walk herself to the bathroom and back after the episode. Denies any facial droop, weakness in arms / legs, sensory deficits, dysphadia, CP, SOB, nausea, vomiting, abdominal pain     Palliative consulted for GOC as patient developed SBO requiring surgery although not a surgical candidate.    3/21: Seen and examined at bedside. Patient drowsy, arouses to voice. A&Ox3. c/o pain in abdomen, NGT to suction with large bilious output. Patient's son, Kareem, and daughter-in-law at bedside. See GOC discussion below.  3/22: More lethargic today, arouses to voice. On 100% NRB. WBC increasing and worsening GURVINDER this AM. Output from NGT decreased, she continues to have abdominal pain. Encouraged use of PRN IV Dilaudid as needed. Spoke with son - please see GOC.      CODE STATUS: DNR/DNI trial NIV      Pain and Dyspnea:     +moderate-severe abdominal pain  IV Dilaudid 0.2 mg every 4 hours   having difficultly taking oral meds at this time as she has some discomfort to the back of her throat    appears tired, endorsing some dyspnea  on 100% NRB      Review of Systems:    Anxiety- denies  Depression-  Physical Discomfort- ++  Dyspnea- ++  Constipation- +SBO  Diarrhea- denies  Nausea- denies  Vomiting- denies  Anorexia- NPO  Weight Loss-   Cough- denies  Secretions-  Fatigue- ++  Weakness- ++  Delirium- denies    All other systems reviewed and negative  Unable to obtain/Limited due to:       PHYSICAL EXAM:    Vital Signs Last 24 Hrs  T(C): 36.4 (22 Mar 2024 08:35), Max: 37.2 (21 Mar 2024 15:30)  T(F): 97.5 (22 Mar 2024 08:35), Max: 98.9 (21 Mar 2024 15:30)  HR: 93 (22 Mar 2024 08:35) (93 - 96)  BP: 154/60 (22 Mar 2024 08:35) (117/89 - 154/60)  BP(mean): --  RR: 18 (22 Mar 2024 08:35) (18 - 18)  SpO2: 100% (22 Mar 2024 08:35) (97% - 100%)    Parameters below as of 22 Mar 2024 08:35  Patient On (Oxygen Delivery Method): mask, nonrebreather      Daily     Daily     PPSV2:   10%  FAST:    General: drowsy, elderly woman in mild distress  Mental Status: A&Ox3  HEENT: NGT to suction, on 100% NRB  Lungs: coarse b/l  Cardiac: irregular rate and rhythm  GI: abdomen TTP all quadrants. no flatus   : no suprapubic tenderness  Ext: MAEx4  Neuro: A&Ox3. Drowsy, Speech intact. No focal neuro deficits        LABS and RADIOLOGY: REVIEWED

## 2024-03-22 NOTE — PROGRESS NOTE ADULT - ASSESSMENT
89 y/o F w/ PMH of HTN, dyslipidemia, PPM, paroxysmal a-fib (on coumadin), TIAs, DM2, p/w aphasia HEAD CT: Mild volume loss, microvascular disease, no acute hemorrhage or midline shift..      #SBO  -Repeat CT abdomen/pelvis with p.o. contrast: Persistent SBO with small bowel pneumatosis and portal venous gas  -Remove NGT per patient's & family request  -Serial abdominal exam  -Not a surgical candidate  -N.p.o.  -IV fluids  -Zosyn  -Pain management  -Patient with worsening leukocytosis, renal function  -Guarded prognosis  -Patient seen with palliative team, ongoing goals of care conversation plan to transition to CMO, send to discuss with other family members    #Aphasia suspected secondary to TIA  -symptoms resolved  -CTH: No acute findings   -MRI negative for stroke, suspected TIA  -Neuro checks  -ASA allergy (patient states she develops hives w/ ASA use)   -Statin  -Tele monitoring  -Fall precautions     #Left knee pain   -Ct showing moderate effusion  -ortho consulted: S/P Knee aspiration   -pain control    #Essential Hypertension  -Cardizem IV     #Elevated troponin, suspect demand ischemia  -Lovenox 1mg/kg daily based on CrCl   -ASA allergy   -Cardio following  -Tele monitoring  -Echo  Estimated left ventricular ejection fraction is 55-60 %.  -EKG Reviewed     #Paroxysmal A-fib  -Lovenox   -CArdizem     #DM2  -Humalog ISS + Basal insulin   -Diabetic diet       #H/o dyslipidemia  -C/w home meds     #Mild thrombocytopenia   -F/u outpatient for further management if remains stable     #DVT ppx   -Lovenox     #Advanced Care Directives   -DNR/DNI with trial of NIV   -discussed with family regarding CMO, son to discuss with other family members   -overall poor prognosis

## 2024-03-22 NOTE — PROGRESS NOTE ADULT - CONVERSATION DETAILS
Spoke with patient and son at bedside. Patient is more lethargic today, she allowed me to speak with her son outside the room.    I discussed with son concern that patient's condition is continuing to worsen despite all medical treatment. Discussed transitioning patient to comfort measures only, as continuing treatment with further PPN and IVF fluids will likely contribute to her discomfort as her body slowly starts to shut down.    Understandably, son is shocked of news that patient is now dying as she came into the hospital with symptoms of a stroke, and he was planning her to go to rehab for a few days and then return home.    Patient is drowsy but certainly arousable. She expressed to me to "let her go".     Dr. Pizano and I spoke with the patient's son again later this morning to discuss the above. We again discussed recommendations of CMO. Son wishes to speak with his family first, I encouraged him to speak with them today as her condition continues to deteriorate and has the potential to rapidly decline. Concern that patient, even with continued treatment, likely has days to live.     Explained use of IV Hydromorphone as needed as family discusses decisions for care. Son in agreement.    d/w surgery team: they are to discuss with family as well patient's prognosis.    MOLST on chart with DNR/DNI trial NIV. Palliative will f/u with family this afternoon.

## 2024-03-22 NOTE — PROGRESS NOTE ADULT - ASSESSMENT
89y Female with a past medical history of HTN, HLD  AAS status post TAVR, paroxysmal atrial fibrillation, history of TIA, on current admission with aphasia consistent with TIA.    Gen surgery consulted for evaluation for SBO.    Ct abd: Small bowel obstruction unchanged from yesterday's CT, with transition   point in the pelvis/right lower quadrant. The stomach is distended with   gas and oral contrast, as on prior.    NGT placed 3/17, gastrograffin challenged performed, contrast is in the colon, small amount of gas and BMs.  NGT removed, pt tolerating CLD but poor appetite, mild pain and distention    Repeat Ct scan showed recurrent SBO with portal gas and pneumatosis, NGT placed 2 L on insertion  Further discussion with family, patient is a poor surgical candidate, no intervention at this moment  DNR/DNI    Recommendation    keep NGT to cont low wall suction for comfort  Palliative on board for comfort care and COG  No surgical intervention at the moment  Rest of care per primary team     Case discussed with Dr. Pedraza

## 2024-03-23 NOTE — CHART NOTE - NSCHARTNOTESELECT_GEN_ALL_CORE
Dietitian F/U w/ PPN
Dietitian F/U w/ PPN
Event Note
Event Note
Dietitian F/U w/ PPN
Orthopaedic Surgery/Event Note

## 2024-03-23 NOTE — CHART NOTE - NSCHARTNOTEFT_GEN_A_CORE
Clinical Nutrition PN Follow Up Note    *89 y/o F w/ PMH of HTN, dyslipidemia, PPM, paroxysmal a-fib (on coumadin), TIAs, DM2, p/w aphasia. Patient states she finished her dinner and was feeling like her normal self, and then around 6pm her speech became abnormal when speaking to her . States her speech was coming out garbled, and this lasted for 10 minutes before resolving spontaneously. States she was able to walk herself to the bathroom and back after the episode. Denies any facial droop, weakness in arms / legs, sensory deficits, dysphadia, CP, SOB, nausea, vomiting, abdominal pain     *Plan to initiate PPN 3/21 2/2 SBO and ischemia, intolerance of PO intake.     *3/13: Admit dx  TIA. Visited pt at bedside, w/ one-to-one; RD obtained bedscale wt of 208# 3/13. Pt reports that she's had some weight loss in the past few weeks, she doesn't know how much, but says it was "not on purpose." NFPE reveals muscle wasting, fat wasting. PO intake estimated < 75% ENN > one month. Pt on warfarin. Pt w/ T2DM, HgbA1c of 7.5%, POCTs elevated. A target glucose range of 140-180 mg/dL is recommended for most critically ill and non-critically ill patients. At home, pt on glargine, Januvia. Has a CGM, Freestyle Pelon. Pt on 15U lantus at , corrective and nutritional bolus. Has upper and lower dentures. Seen by SLP - recommends regular texture diet, thin liquids. Suggest liberalize diet to regular to maximize intake  *3/21: Pt began having diarrhea and dark emesis 3/15, C.diff negative. CT revealed SBO - NGT to LWS initiated; made NPO. Gastrograffin challenge performed, contrast in colon - NGT removed 3/19. Placed on CLD 3/19 however continues with poor appetite as per surgical note 3/20. CT 3/20 revealed pneumatosis w/ portal venous gas concerned for obstruction and ischemia, NGT placed to LWS. As per surgery note 3/20, plan for diagnostic laparoscopy Saturday if pt does not improve. Rapid response called 3/20 for AMS. 3/21 pt pulled NGT, now repositioned - 2.8L output overnight; RD noted 450mL output this am. RD obtained new bed scale wt of 81kg 3/21, large discrepancy with last RD bed scale wt of 94kg 3/13; 81kg appears accurate - will readjust ENN. Made NPO and plan to initiate PPN today. PPN is not a long term nutrition intervention as it does not provide full estimated nutrient needs. Would only rec'd PPN x 7 days, if suspected to remain w/o bowel function > 7 days will need PICC line placed for TPN. Recommend palliative care consult to confirm goals of care regarding nutrition support. Monitor closely for refeeding syndrome - please obtain BMP, Mg, and Phos levels. Monitor and replete K, Phos, Mg prn if begins to downtrend, especially if IV dextrose started. Recommend to check refeeding labs BID x 2-3 days upon initiation and then will reevaluate. Consider supplementing low lytes prior to initiating nutrition support. Will add 200mg of thiamine and MVI/MIN into PN bag daily. Please see additional recommendations below.   *3/22: NGT remains in place w/ bilious output. As per surgery yesterday, O2 requirements have increased and lactate trending upwards - NOT considered a surgical candidate as surgery too high risk, no intervention planned at this time. Seen by palliative yesterday, confirmed DNR/DNI and "very poor prognosis" noted, however, son wishes to c/w IV abx and supportive care 2/2 "chance SBO may resolve on own," pall rec'd if pt's comfort is compromised to switch to comfort measures - palliative to f/u today as per note. Will c/w PPN today as d/w Dr. Pizano and currently within [facility], however, as no surgical intervention planned and prognosis poor would STRONGLY consider d/c'ing PN and placing on comfort care 2/2 overall declining medical status which evidenced based studies indicate EN/PN is NOT effective in prolonging survival and improving quality of life. It can also increase risk of aspiration pneumonia as well as other related issues (infection, GI complications, and worsening/ non-healing PI's).     *current status: Remains NPO but NGT removed yesterday as per pt and family request, continues w/ abdominal pain. Palliative following, pt more lethargic yesterday w/ increasing WBC and worsening GURVINDER, reports condition continuing to worsen despite all medical treatments, "condition continues to deteriorate and has potential to rapidly decline. Concern that pt, even w/ continued treatment, likely has days to live." Palliative to f/u w/ confirmed GOC as Son is to discuss w/ family. Will c/w PPN for now 2/2 remains NPO and currently within G[facility], however, strongly rec to d/c given pt's overall declining medical status.     *pertinent meds: ativan, lipitor (not received), cardizem, Lantus (10 units HS), Admelog (13 units x 24 hours), metoprolol, abx in D5, oxycodone, IVF    *labs reviewed: Na WNL but corrected Na for hyperglycemia high-end of normal (144), will c/w total fluid volume of 2300 mL for today and continue to monitor fluid status and adjust prn as per labs, will consider increasing total volume if corrected Na still elevated tomorrow. K+ low-end of normal, will increase in PN bad and continue to adjust prn as per labs. Phos now WNL but still high-end of normal, was elevated yesterday and removed from bag, will keep Phos out of PN bag today and consider adding back at low level if downtrend continues and consistently WNL. Mg WNL. POCTs x 24 hrs elevated (235, 341, 304, 238, 263), will keep insulin out of PN bag for today as BG was variable yesterday, if BG remains elevated, will consider adding in insulin, still receiving insulin outside of PN bag as well - **continue to monitor and optimize BG levels between 140-180 mg/dL by medical management outside of PN bag. TBili remains WNL to c/w trace elements in PN bag. Corrected Ca on higher end of normal, do NOT supplement calcium outside of PN bag at this time. TG remains WNL (193 on 3/22), will c/w 75g of lipids daily - **will c/w plant-based lipids rather than SMOF lipids in PN bag 2/2 shellfish allergy noted as per EMR chart.     03-23    140  |  106  |  88<H>  ----------------------------<  323<H>  3.8   |  26  |  2.73<H>    Ca    7.9<L>      23 Mar 2024 05:00  Phos  4.5     03-23  Mg     2.2     03-23    TPro  6.3  /  Alb  1.5<L>  /  TBili  0.6  /  DBili  x   /  AST  30  /  ALT  15  /  AlkPhos  80  03-23    BMI: BMI (kg/m2): 32 (03-17-24 @ 21:35)  HbA1c: A1C with Estimated Average Glucose Result: 7.4 % (03-13-24 @ 06:46)    CAPILLARY BLOOD GLUCOSE  POCT Blood Glucose.: 235 mg/dL (23 Mar 2024 11:38)  POCT Blood Glucose.: 341 mg/dL (23 Mar 2024 06:51)  POCT Blood Glucose.: 304 mg/dL (22 Mar 2024 22:24)  POCT Blood Glucose.: 238 mg/dL (22 Mar 2024 12:18)    BP: 158/87 (03-23-24 @ 08:37) (117/59 - 158/87)Vital Signs Last 24 Hrs  T(C): 37 (03-23-24 @ 08:37), Max: 37 (03-23-24 @ 08:37)  T(F): 98.6 (03-23-24 @ 08:37), Max: 98.6 (03-23-24 @ 08:37)  HR: 124 (03-23-24 @ 08:37) (124 - 124)  BP: 158/87 (03-23-24 @ 08:37) (158/87 - 158/87)  BP(mean): 106 (03-23-24 @ 08:37) (106 - 106)  RR: 18 (03-23-24 @ 08:37) (18 - 18)  SpO2: 93% (03-23-24 @ 08:37) (93% - 93%)    Lipid Panel: Date/Time: 03-22-24 @ 04:39  Triglycerides, Serum: 193    I&O's Detail - None doc'd as per flowsheet, ***Strict I&O's are rec'd when pt is on PN as per protocol***  *fluid status: no I&O's doc'd  *BM (+) on 3/21.  small amount, loose, brown, fecal incontinence noted. pt not on bowel regimen.  *edema: 1+ L knee edema doc'd 3/21    *malnutrition: Pt continues to meet criteria for severe protein-calorie malnutrition in context of acute disease r/t inability to consume sufficient energy/protein 2/2 SBO AEB intake <50% ENN, moderate muscle wasting, fat wasting    Estimated Needs: based on 81 Kg (RD bedscale wt from 3/13)  Calories: 2025 - 2430 Kcal (20-25 Kcal/Kg)  Protein:  g (1.2-1.5g/Kg)  Fluids:  2025 - 2430 mL (20-25mL/Kg)    Diet, NPO (03-20-24 @ 17:04) [Active]    Weight History:  Daily   Height (cm): 160 (03-11-24 @ 19:24)  Weight (kg): 81.9 (03-17-24 @ 21:35)  BMI (kg/m2): 32 (03-17-24 @ 21:35)  BSA (m2): 1.85 (03-17-24 @ 21:35)  IBW: 115#    PPN Recommendations: via peripheral line  Total Volume: 2300  x 24 hours  100 g  Amino Acids  100 g Dextrose  75 g Lipids 20% (**plant-based lipids 2/2 shellfish allergy noted as per EMR)  157 mEq Sodium Chloride  12 mEq Sodium Acetate  0 mmol Sodium Phosphate  30 mEq Potassium Chloride  25 mEq Potassium Acetate  0 mmol Potassium Phosphate  0 mEq Calcium Gluconate (CAPS out of PN solution)  8 mEq Magnesium Sulfate  200 mg Thiamine  1 ml Trace Elements  10 ml MVI    Total Calories      1490     (Meets    71%  of  Estimated Energy needs  and    100%  Protein needs)   (osmolarity ~869)    Additional Recommendations:    1) Daily weights  2) Strict I & O's  3) Daily lyte checks including magnesium and phos  4) Weekly triglycerides/LFT checks  5) POCT q6hrs; maintain 140-180mg/dL  6) if on PN > 6 days, consider placing PICC line to meet 100% of nutr needs  7) ADAT to CLD to FLD to low fiber as medically feasible as per MD orders   8) Palliative following, recommend to re-confirm goals of care regarding nutrition support - consider d/c'ing PN and placing on comfort care     *will continue to monitor and adjust PN prn*  Elaina Montilla RDN Clinical Nutrition PN Follow Up Note    *89 y/o F w/ PMH of HTN, dyslipidemia, PPM, paroxysmal a-fib (on coumadin), TIAs, DM2, p/w aphasia. Patient states she finished her dinner and was feeling like her normal self, and then around 6pm her speech became abnormal when speaking to her . States her speech was coming out garbled, and this lasted for 10 minutes before resolving spontaneously. States she was able to walk herself to the bathroom and back after the episode. Denies any facial droop, weakness in arms / legs, sensory deficits, dysphadia, CP, SOB, nausea, vomiting, abdominal pain     *Plan to initiate PPN 3/21 2/2 SBO and ischemia, intolerance of PO intake.     *3/13: Admit dx  TIA. Visited pt at bedside, w/ one-to-one; RD obtained bedscale wt of 208# 3/13. Pt reports that she's had some weight loss in the past few weeks, she doesn't know how much, but says it was "not on purpose." NFPE reveals muscle wasting, fat wasting. PO intake estimated < 75% ENN > one month. Pt on warfarin. Pt w/ T2DM, HgbA1c of 7.5%, POCTs elevated. A target glucose range of 140-180 mg/dL is recommended for most critically ill and non-critically ill patients. At home, pt on glargine, Januvia. Has a CGM, Freestyle Pelon. Pt on 15U lantus at , corrective and nutritional bolus. Has upper and lower dentures. Seen by SLP - recommends regular texture diet, thin liquids. Suggest liberalize diet to regular to maximize intake  *3/21: Pt began having diarrhea and dark emesis 3/15, C.diff negative. CT revealed SBO - NGT to LWS initiated; made NPO. Gastrograffin challenge performed, contrast in colon - NGT removed 3/19. Placed on CLD 3/19 however continues with poor appetite as per surgical note 3/20. CT 3/20 revealed pneumatosis w/ portal venous gas concerned for obstruction and ischemia, NGT placed to LWS. As per surgery note 3/20, plan for diagnostic laparoscopy Saturday if pt does not improve. Rapid response called 3/20 for AMS. 3/21 pt pulled NGT, now repositioned - 2.8L output overnight; RD noted 450mL output this am. RD obtained new bed scale wt of 81kg 3/21, large discrepancy with last RD bed scale wt of 94kg 3/13; 81kg appears accurate - will readjust ENN. Made NPO and plan to initiate PPN today. PPN is not a long term nutrition intervention as it does not provide full estimated nutrient needs. Would only rec'd PPN x 7 days, if suspected to remain w/o bowel function > 7 days will need PICC line placed for TPN. Recommend palliative care consult to confirm goals of care regarding nutrition support. Monitor closely for refeeding syndrome - please obtain BMP, Mg, and Phos levels. Monitor and replete K, Phos, Mg prn if begins to downtrend, especially if IV dextrose started. Recommend to check refeeding labs BID x 2-3 days upon initiation and then will reevaluate. Consider supplementing low lytes prior to initiating nutrition support. Will add 200mg of thiamine and MVI/MIN into PN bag daily. Please see additional recommendations below.   *3/22: NGT remains in place w/ bilious output. As per surgery yesterday, O2 requirements have increased and lactate trending upwards - NOT considered a surgical candidate as surgery too high risk, no intervention planned at this time. Seen by palliative yesterday, confirmed DNR/DNI and "very poor prognosis" noted, however, son wishes to c/w IV abx and supportive care 2/2 "chance SBO may resolve on own," pall rec'd if pt's comfort is compromised to switch to comfort measures - palliative to f/u today as per note. Will c/w PPN today as d/w Dr. Pizano and currently within [facility], however, as no surgical intervention planned and prognosis poor would STRONGLY consider d/c'ing PN and placing on comfort care 2/2 overall declining medical status which evidenced based studies indicate EN/PN is NOT effective in prolonging survival and improving quality of life. It can also increase risk of aspiration pneumonia as well as other related issues (infection, GI complications, and worsening/ non-healing PI's).     *current status: Remains NPO but NGT removed yesterday as per pt and family request as per hospitalist, continues w/ abdominal pain. Palliative following, pt more lethargic yesterday w/ increasing WBC and worsening GURVINDER, reports condition continuing to worsen despite all medical treatments, "condition continues to deteriorate and has potential to rapidly decline. Concern that pt, even w/ continued treatment, likely has days to live." Palliative to f/u w/ confirmed GOC as Son is to discuss w/ family. Will c/w PPN for now as d/w Dr. Pizano 2/2 remains NPO and PN currently within [facility], however, strongly rec to d/c given pt's overall declining medical status and consider comfort care measures.     *pertinent meds: ativan, lipitor (not received), cardizem, Lantus (10 units HS), Admelog (13 units x 24 hours), metoprolol, abx in D5, oxycodone, IVF    *labs reviewed: Na WNL but corrected Na for hyperglycemia high-end of normal (144), will c/w total fluid volume of 2300 mL for today and continue to monitor fluid status and adjust prn as per labs, will consider increasing total volume if corrected Na still elevated tomorrow. K+ low-end of normal, will increase in PN bad and continue to adjust prn as per labs. Phos now WNL but still high-end of normal, was elevated yesterday and removed from bag, will keep Phos out of PN bag today and consider adding back at low level if downtrend continues and consistently WNL. Mg WNL. POCTs x 24 hrs elevated (235, 341, 304, 238, 263), will add in 5 units of regular insulin in PN bag for today to cover dextrose, still receiving insulin outside of PN bag - **continue to monitor and optimize BG levels between 140-180 mg/dL by medical management outside of PN bag. TBili remains WNL to c/w trace elements in PN bag. Corrected Ca on higher end of normal, do NOT supplement calcium outside of PN bag at this time. TG remains WNL (193 on 3/22), will c/w 75g of lipids daily - **will c/w plant-based lipids rather than SMOF lipids in PN bag 2/2 shellfish allergy noted as per EMR chart.     03-23    140  |  106  |  88<H>  ----------------------------<  323<H>  3.8   |  26  |  2.73<H>    Ca    7.9<L>      23 Mar 2024 05:00  Phos  4.5     03-23  Mg     2.2     03-23    TPro  6.3  /  Alb  1.5<L>  /  TBili  0.6  /  DBili  x   /  AST  30  /  ALT  15  /  AlkPhos  80  03-23    BMI: BMI (kg/m2): 32 (03-17-24 @ 21:35)  HbA1c: A1C with Estimated Average Glucose Result: 7.4 % (03-13-24 @ 06:46)    CAPILLARY BLOOD GLUCOSE  POCT Blood Glucose.: 235 mg/dL (23 Mar 2024 11:38)  POCT Blood Glucose.: 341 mg/dL (23 Mar 2024 06:51)  POCT Blood Glucose.: 304 mg/dL (22 Mar 2024 22:24)  POCT Blood Glucose.: 238 mg/dL (22 Mar 2024 12:18)    BP: 158/87 (03-23-24 @ 08:37) (117/59 - 158/87)Vital Signs Last 24 Hrs  T(C): 37 (03-23-24 @ 08:37), Max: 37 (03-23-24 @ 08:37)  T(F): 98.6 (03-23-24 @ 08:37), Max: 98.6 (03-23-24 @ 08:37)  HR: 124 (03-23-24 @ 08:37) (124 - 124)  BP: 158/87 (03-23-24 @ 08:37) (158/87 - 158/87)  BP(mean): 106 (03-23-24 @ 08:37) (106 - 106)  RR: 18 (03-23-24 @ 08:37) (18 - 18)  SpO2: 93% (03-23-24 @ 08:37) (93% - 93%)    Lipid Panel: Date/Time: 03-22-24 @ 04:39  Triglycerides, Serum: 193    I&O's Detail - None doc'd as per flowsheet, ***Strict I&O's are rec'd when pt is on PN as per protocol***  *fluid status: no I&O's doc'd  *BM (+) on 3/21.  small amount, loose, brown, fecal incontinence noted. pt not on bowel regimen.  *edema: 1+ L knee edema doc'd 3/21    *malnutrition: Pt continues to meet criteria for severe protein-calorie malnutrition in context of acute disease r/t inability to consume sufficient energy/protein 2/2 SBO AEB intake <50% ENN, moderate muscle wasting, fat wasting    Estimated Needs: based on 81 Kg (RD bedscale wt from 3/13)  Calories: 2025 - 2430 Kcal (20-25 Kcal/Kg)  Protein:  g (1.2-1.5g/Kg)  Fluids:  2025 - 2430 mL (20-25mL/Kg)    Diet, NPO (03-20-24 @ 17:04) [Active]    Weight History:  Daily   Height (cm): 160 (03-11-24 @ 19:24)  Weight (kg): 81.9 (03-17-24 @ 21:35)  BMI (kg/m2): 32 (03-17-24 @ 21:35)  BSA (m2): 1.85 (03-17-24 @ 21:35)  IBW: 115#    PPN Recommendations: via peripheral line  Total Volume: 2300  x 24 hours  100 g  Amino Acids  100 g Dextrose  75 g Lipids 20% (**plant-based lipids 2/2 shellfish allergy noted as per EMR)  157 mEq Sodium Chloride  12 mEq Sodium Acetate  0 mmol Sodium Phosphate  30 mEq Potassium Chloride  25 mEq Potassium Acetate  0 mmol Potassium Phosphate  0 mEq Calcium Gluconate (CAPS out of PN solution)  8 mEq Magnesium Sulfate  200 mg Thiamine  1 ml Trace Elements  10 ml MVI  5 units Regular insulin     Total Calories      1490     (Meets    71%  of  Estimated Energy needs  and    100%  Protein needs)   (osmolarity ~869)    Additional Recommendations:    1) Daily weights  2) Strict I & O's  3) Daily lyte checks including magnesium and phos  4) Weekly triglycerides/LFT checks  5) POCT q6hrs; maintain 140-180mg/dL**  6) if on PN > 6 days, consider placing PICC line to meet 100% of nutr needs  7) ADAT to CLD to FLD to low fiber as medically feasible as per MD orders   8) Palliative following, recommend to re-confirm goals of care regarding nutrition support - consider d/c'ing PN and placing on comfort care     *will continue to monitor and adjust PN prn*  Elaina Montilla, ANGEL LUISN

## 2024-03-23 NOTE — DISCHARGE NOTE FOR THE EXPIRED PATIENT - HOSPITAL COURSE
89-year-old female with past medical history of hypertension, hyperlipidemia, PPM, PAF, TIAs, diabetes presented with aphasia, likely secondary to TIA, Hospital course complicated by small bowel obstruction, not amenable to surgical intervention due to multiple comorbidities.  Subsequently, noted to have worsening leukocytosis, renal function.  Family decided to transition to comfort care only.  Patient was pronounced  on 2024 at 7 PM.  Family was at bedside.  Emotional support provided.

## 2024-03-23 NOTE — DISCHARGE NOTE FOR THE EXPIRED PATIENT - NS PRELIMINARY CAUSE OF DEATH_RESTRICTED
Cardiopulmonary Arrest/Failure to Thrive/Multi-organ Dysfunction Syndrome/Renal Failure/Respiratory Failure/Sepsis

## 2024-03-23 NOTE — PROGRESS NOTE ADULT - PROVIDER SPECIALTY LIST ADULT
Cardiology
Surgery
Hospitalist
Hospitalist
Neurology
Surgery
Surgery
Cardiology
Hospitalist
Surgery
Hospitalist
Neurology
Palliative Care
Hospitalist
Hospitalist
Surgery
Surgery

## 2024-03-23 NOTE — PROGRESS NOTE ADULT - REASON FOR ADMISSION
aphasia

## 2024-03-23 NOTE — PROGRESS NOTE ADULT - SUBJECTIVE AND OBJECTIVE BOX
HOSPITALIST ATTENDING PROGRESS NOTE     Chart and meds reviewed. Patient seen and examined     CC: aphasia    Subjective: Pt seen and evaluated. Currently, reports of mild abdominal discomfort. No nausea, vomiting or other acute complaints. Patient and family wants NGT removed. +BMs, per RN.     3/23: Pt seen and evaluated. Refusing medications and other interventions. Family at bedside. Transitioned to CMO.     All other systems and founds to be negative with exception of what has been described above.     PHYSICAL EXAM:  Vital Signs Last 24 Hrs  T(C): 37 (23 Mar 2024 08:37), Max: 37 (23 Mar 2024 08:37)  T(F): 98.6 (23 Mar 2024 08:37), Max: 98.6 (23 Mar 2024 08:37)  HR: 124 (23 Mar 2024 08:37) (124 - 124)  BP: 158/87 (23 Mar 2024 08:37) (158/87 - 158/87)  BP(mean): 106 (23 Mar 2024 08:37) (106 - 106)  RR: 18 (23 Mar 2024 08:37) (18 - 18)  SpO2: 93% (23 Mar 2024 08:37) (93% - 93%)    Parameters below as of 23 Mar 2024 08:37  Patient On (Oxygen Delivery Method): mask, nonrebreather    GEN: NAD, +frail. +NRB in place   HEENT: EOMI,  +dry MM  NECK : Soft and supple, no JVD  LUNG: +decreased breath sounds   CVS: S1S2+, RRR, no M/G/R  GI: BS+, +diffuse abdominal discomfort on palpation   EXTREMITIES: No peripheral edema  VASCULAR: 2+ peripheral pulses  NEURO: AAOx3, grossly non-focal   SKIN: No rashes    HOME MEDICATIONS:  Home Medications:  atorvastatin 20 mg oral tablet: 1 tab(s) orally once a day (at bedtime) (11 Mar 2024 22:14)  insulin glargine: 15 unit(s) subcutaneous once a day (at bedtime) (11 Mar 2024 22:14)  Januvia 25 mg oral tablet: 1 tab(s) orally once a day (11 Mar 2024 22:17)  Toprol-XL 50 mg oral tablet, extended release: 1 tab(s) orally once a day (at bedtime) (11 Mar 2024 22:15)  Tylenol Extra Strength 500 mg oral tablet: 2 tab(s) orally 3 times a day as needed for back pain (11 Mar 2024 22:17)  warfarin 1 mg oral tablet: 0.5 tab(s) orally once a week on Saturdays ***Pt takes 2mg 6 days a week Sunday through Friday, and 2.5mg on Saturdays*** (11 Mar 2024 22:17)  warfarin 2 mg oral tablet: 1 tab(s) orally once a day (at bedtime) ***Pt takes 2mg 6 days a week Sunday through Friday and 2.5mg on Saturday*** (11 Mar 2024 22:16)      MEDICATIONS  MEDICATIONS  (STANDING):  atorvastatin 40 milliGRAM(s) Oral at bedtime  dextrose 5%. 1000 milliLiter(s) (100 mL/Hr) IV Continuous <Continuous>  dextrose 5%. 1000 milliLiter(s) (50 mL/Hr) IV Continuous <Continuous>  dextrose 50% Injectable 25 Gram(s) IV Push once  dextrose 50% Injectable 12.5 Gram(s) IV Push once  dextrose 50% Injectable 25 Gram(s) IV Push once  diltiazem Injectable 10 milliGRAM(s) IV Push every 8 hours  enoxaparin Injectable 80 milliGRAM(s) SubCutaneous every 24 hours  glucagon  Injectable 1 milliGRAM(s) IntraMuscular once  insulin glargine Injectable (LANTUS) 10 Unit(s) SubCutaneous at bedtime  insulin lispro (ADMELOG) corrective regimen sliding scale   SubCutaneous every 6 hours  lidocaine   4% Patch 1 Patch Transdermal daily  lipid, fat emulsion (Plant Based) 20% Infusion 0.92 Gm/kG/Day (46.88 mL/Hr) IV Continuous <Continuous>  melatonin 3 milliGRAM(s) Oral at bedtime  metoprolol succinate ER 50 milliGRAM(s) Oral daily  Parenteral Nutrition - Adult 1 Each (96 mL/Hr) TPN Continuous <Continuous>  Parenteral Nutrition - Adult 1 Each (96 mL/Hr) TPN Continuous <Continuous>  piperacillin/tazobactam IVPB.. 3.375 Gram(s) IV Intermittent every 8 hours  polyethylene glycol 3350 17 Gram(s) Oral daily  senna 2 Tablet(s) Oral at bedtime  sodium chloride 0.9%. 1000 milliLiter(s) (75 mL/Hr) IV Continuous <Continuous>    MEDICATIONS  (PRN):  acetaminophen     Tablet .. 650 milliGRAM(s) Oral every 6 hours PRN Mild Pain (1 - 3)  artificial  tears Solution 2 Drop(s) Both EYES every 1 hour PRN Dry Eyes  bisacodyl Suppository 10 milliGRAM(s) Rectal daily PRN Constipation  dextrose Oral Gel 15 Gram(s) Oral once PRN Blood Glucose LESS THAN 70 milliGRAM(s)/deciliter  glycopyrrolate Injectable 0.4 milliGRAM(s) IV Push four times a day PRN Secretions  HYDROmorphone  Injectable 0.5 milliGRAM(s) IV Push every 2 hours PRN Moderate pain (4-6), Severe pain (7-10), Respiratory rate greater than 22  LORazepam   Injectable 0.5 milliGRAM(s) IV Push every 4 hours PRN Anxiety  meclizine 25 milliGRAM(s) Oral every 8 hours PRN Dizziness  metoclopramide Injectable 10 milliGRAM(s) IV Push three times a day PRN nausea  ondansetron Injectable 4 milliGRAM(s) IV Push every 8 hours PRN Nausea and/or Vomiting      LABS: All Labs Reviewed:                        11.0   22.00 )-----------( 203      ( 22 Mar 2024 04:39 )             34.8     03-22    143  |  107  |  77<H>  ----------------------------<  245<H>  4.1   |  26  |  3.19<H>    Ca    8.0<L>      22 Mar 2024 04:39  Phos  5.0     03-22  Mg     2.3     03-22    TPro  6.4  /  Alb  1.7<L>  /  TBili  0.7  /  DBili  x   /  AST  22  /  ALT  11<L>  /  AlkPhos  73  03-22    PT/INR - ( 22 Mar 2024 04:39 )   PT: 15.7 sec;   INR: 1.40 ratio         PTT - ( 22 Mar 2024 04:39 )  PTT:30.0 sec  ABG - ( 20 Mar 2024 19:29 )  pH, Arterial: 7.44  pH, Blood: x     /  pCO2: 35    /  pO2: 112   / HCO3: 24    / Base Excess: 0.1   /  SaO2: 98                Urinalysis Basic - ( 22 Mar 2024 04:39 )    Color: x / Appearance: x / SG: x / pH: x  Gluc: 245 mg/dL / Ketone: x  / Bili: x / Urobili: x   Blood: x / Protein: x / Nitrite: x   Leuk Esterase: x / RBC: x / WBC x   Sq Epi: x / Non Sq Epi: x / Bacteria: x        Culture - Blood (collected 20 Mar 2024 14:58)  Source: .Blood Blood-Peripheral  Preliminary Report (21 Mar 2024 22:02):    No growth at 24 hours    Culture - Blood (collected 20 Mar 2024 14:57)  Source: .Blood None  Preliminary Report (21 Mar 2024 22:02):    No growth at 24 hours      Blood Culture: 03-20 @ 14:58  Organism --  Gram Stain Blood -- Gram Stain --  Specimen Source .Blood Blood-Peripheral  Culture-Blood --    03-20 @ 14:57  Organism --  Gram Stain Blood -- Gram Stain --  Specimen Source .Blood None  Culture-Blood --      I&O's Detail    CAPILLARY BLOOD GLUCOSE      POCT Blood Glucose.: 238 mg/dL (22 Mar 2024 12:18)  POCT Blood Glucose.: 263 mg/dL (22 Mar 2024 08:05)  POCT Blood Glucose.: 142 mg/dL (22 Mar 2024 00:51)        CARDIOLOGY TESTING   EKG: reviewed   ECHO: reviewed    RADIOLOGY: reviewed

## 2024-03-23 NOTE — PROGRESS NOTE ADULT - ASSESSMENT
89 y/o F w/ PMH of HTN, dyslipidemia, PPM, paroxysmal a-fib (on coumadin), TIAs, DM2, p/w aphasia HEAD CT: Mild volume loss, microvascular disease, no acute hemorrhage or midline shift..      #SBO  -Repeat CT abdomen/pelvis with p.o. contrast: Persistent SBO with small bowel pneumatosis and portal venous gas  -Remove NGT per patient's & family request  -Serial abdominal exam  -Not a surgical candidate  -N.p.o.  -IV fluids  -Zosyn  -Pain management  -Patient with worsening leukocytosis, renal function  -Guarded prognosis  -Patient seen with palliative team, ongoing goals of care conversation plan to transition to CMO, send to discuss with other family members    #Aphasia suspected secondary to TIA  -symptoms resolved  -CTH: No acute findings   -MRI negative for stroke, suspected TIA  -Neuro checks  -ASA allergy (patient states she develops hives w/ ASA use)   -Statin  -Tele monitoring  -Fall precautions     #Left knee pain   -Ct showing moderate effusion  -ortho consulted: S/P Knee aspiration   -pain control    #Essential Hypertension  -Cardizem IV     #Elevated troponin, suspect demand ischemia  -Lovenox 1mg/kg daily based on CrCl   -ASA allergy   -Cardio following  -Tele monitoring  -Echo  Estimated left ventricular ejection fraction is 55-60 %.  -EKG Reviewed     #Paroxysmal A-fib  -Lovenox   -CArdizem     #DM2  -Humalog ISS + Basal insulin   -Diabetic diet       #H/o dyslipidemia  -C/w home meds     #Mild thrombocytopenia   -F/u outpatient for further management if remains stable     #DVT ppx   -Lovenox     #Advanced Care Directives   -Comfort Mesures only   -new MOLST signed and in the chart

## 2024-03-23 NOTE — PROGRESS NOTE ADULT - NUTRITIONAL ASSESSMENT
This patient has been assessed with a concern for Malnutrition and has been determined to have a diagnosis/diagnoses of Moderate protein-calorie malnutrition.    This patient is being managed with:   Diet DASH/TLC-  Sodium & Cholesterol Restricted  Consistent Carbohydrate {Evening Snack} (CSTCHOSN)  Entered: Mar 12 2024 12:34AM  
This patient has been assessed with a concern for Malnutrition and has been determined to have a diagnosis/diagnoses of Moderate protein-calorie malnutrition.    This patient is being managed with:   Diet DASH/TLC-  Sodium & Cholesterol Restricted  Consistent Carbohydrate {Evening Snack} (CSTCHOSN)  Entered: Mar 12 2024 12:34AM  
This patient has been assessed with a concern for Malnutrition and has been determined to have a diagnosis/diagnoses of Moderate protein-calorie malnutrition.    This patient is being managed with:   lipid fat emulsion (Plant Based) 20% Infusion-  75 Gram(s) in IV Solution 375 milliLiter(s) infuse at 47.1 mL/Hr  Dose Rate: 0.92 Gm/kG/Day Infuse Over: 8 Hours; Stop After 8 Hours  Administration Instructions: Use 1.2 micron in-line filter  Special Instructions: *** NUTRALIPID BRAND ***  Entered: Mar 22 2024 10:00PM    Parenteral Nutrition - Adult-  Entered: Mar 22 2024 10:00PM    Parenteral Nutrition - Adult-  Entered: Mar 21 2024 10:00PM    Diet NPO-  Entered: Mar 20 2024  5:03PM  
This patient has been assessed with a concern for Malnutrition and has been determined to have a diagnosis/diagnoses of Moderate protein-calorie malnutrition.    This patient is being managed with:   Diet NPO-  Entered: Mar 17 2024 10:52AM  
This patient has been assessed with a concern for Malnutrition and has been determined to have a diagnosis/diagnoses of Moderate protein-calorie malnutrition.    This patient is being managed with:   Diet NPO-  Entered: Mar 17 2024 10:52AM  
This patient has been assessed with a concern for Malnutrition and has been determined to have a diagnosis/diagnoses of Moderate protein-calorie malnutrition.    This patient is being managed with:   Diet Clear Liquid-  Entered: Mar 19 2024  9:04AM  
This patient has been assessed with a concern for Malnutrition and has been determined to have a diagnosis/diagnoses of Moderate protein-calorie malnutrition.    This patient is being managed with:   Diet Clear Liquid-  Entered: Mar 20 2024  1:55AM  
This patient has been assessed with a concern for Malnutrition and has been determined to have a diagnosis/diagnoses of Moderate protein-calorie malnutrition.    This patient is being managed with:   Diet DASH/TLC-  Sodium & Cholesterol Restricted  Consistent Carbohydrate {Evening Snack} (CSTCHOSN)  Entered: Mar 12 2024 12:34AM  
This patient has been assessed with a concern for Malnutrition and has been determined to have a diagnosis/diagnoses of Moderate protein-calorie malnutrition.    This patient is being managed with:   Diet DASH/TLC-  Sodium & Cholesterol Restricted  Consistent Carbohydrate {Evening Snack} (CSTCHOSN)  Entered: Mar 15 2024  5:03PM  
This patient has been assessed with a concern for Malnutrition and has been determined to have a diagnosis/diagnoses of Moderate protein-calorie malnutrition.    This patient is being managed with:   lipid fat emulsion (Fish Oil and Plant Based) 20% Infusion-[Known as SMOFLIPID 20% Infusion]  75 Gram(s) in IV Solution 375 milliLiter(s) infuse at 31.3 mL/Hr  Dose Rate: 0.9158 Gm/kG/Day Infuse Over: 12 Hours; Stop After 12 Hours  Administration Instructions: Use 1.2 micron in-line filter  Entered: Mar 21 2024 10:00PM    Parenteral Nutrition - Adult-  Entered: Mar 21 2024 10:00PM    Diet NPO-  Entered: Mar 20 2024  5:03PM  
This patient has been assessed with a concern for Malnutrition and has been determined to have a diagnosis/diagnoses of Moderate protein-calorie malnutrition.    This patient is being managed with:   lipid fat emulsion (Plant Based) 20% Infusion-  75 Gram(s) in IV Solution 375 milliLiter(s) infuse at 46.88 mL/Hr  Dose Rate: 0.92 Gm/kG/Day Infuse Over: 8 Hours; Stop After 8 Hours  Administration Instructions: Use 1.2 micron in-line filter  Entered: Mar 23 2024 10:00PM    Parenteral Nutrition - Adult-  Entered: Mar 23 2024 10:00PM    Parenteral Nutrition - Adult-  Entered: Mar 22 2024 10:00PM    Diet NPO-  Entered: Mar 20 2024  5:03PM  
This patient has been assessed with a concern for Malnutrition and has been determined to have a diagnosis/diagnoses of Moderate protein-calorie malnutrition.    This patient is being managed with:   Diet DASH/TLC-  Sodium & Cholesterol Restricted  Consistent Carbohydrate {Evening Snack} (CSTCHOSN)  Entered: Mar 12 2024 12:34AM

## 2024-03-25 LAB
CULTURE RESULTS: SIGNIFICANT CHANGE UP
CULTURE RESULTS: SIGNIFICANT CHANGE UP
SPECIMEN SOURCE: SIGNIFICANT CHANGE UP
SPECIMEN SOURCE: SIGNIFICANT CHANGE UP

## 2024-03-28 ENCOUNTER — APPOINTMENT (OUTPATIENT)
Dept: ELECTROPHYSIOLOGY | Facility: CLINIC | Age: 89
End: 2024-03-28

## 2024-03-29 LAB
CULTURE RESULTS: SIGNIFICANT CHANGE UP
SPECIMEN SOURCE: SIGNIFICANT CHANGE UP

## 2024-03-31 DIAGNOSIS — Z91.041 RADIOGRAPHIC DYE ALLERGY STATUS: ICD-10-CM

## 2024-03-31 DIAGNOSIS — I48.0 PAROXYSMAL ATRIAL FIBRILLATION: ICD-10-CM

## 2024-03-31 DIAGNOSIS — I12.9 HYPERTENSIVE CHRONIC KIDNEY DISEASE WITH STAGE 1 THROUGH STAGE 4 CHRONIC KIDNEY DISEASE, OR UNSPECIFIED CHRONIC KIDNEY DISEASE: ICD-10-CM

## 2024-03-31 DIAGNOSIS — M17.12 UNILATERAL PRIMARY OSTEOARTHRITIS, LEFT KNEE: ICD-10-CM

## 2024-03-31 DIAGNOSIS — G45.9 TRANSIENT CEREBRAL ISCHEMIC ATTACK, UNSPECIFIED: ICD-10-CM

## 2024-03-31 DIAGNOSIS — Z95.3 PRESENCE OF XENOGENIC HEART VALVE: ICD-10-CM

## 2024-03-31 DIAGNOSIS — Z88.0 ALLERGY STATUS TO PENICILLIN: ICD-10-CM

## 2024-03-31 DIAGNOSIS — Z88.6 ALLERGY STATUS TO ANALGESIC AGENT: ICD-10-CM

## 2024-03-31 DIAGNOSIS — Z66 DO NOT RESUSCITATE: ICD-10-CM

## 2024-03-31 DIAGNOSIS — Z53.09 PROCEDURE AND TREATMENT NOT CARRIED OUT BECAUSE OF OTHER CONTRAINDICATION: ICD-10-CM

## 2024-03-31 DIAGNOSIS — E11.40 TYPE 2 DIABETES MELLITUS WITH DIABETIC NEUROPATHY, UNSPECIFIED: ICD-10-CM

## 2024-03-31 DIAGNOSIS — Z51.5 ENCOUNTER FOR PALLIATIVE CARE: ICD-10-CM

## 2024-03-31 DIAGNOSIS — Z79.84 LONG TERM (CURRENT) USE OF ORAL HYPOGLYCEMIC DRUGS: ICD-10-CM

## 2024-03-31 DIAGNOSIS — N18.9 CHRONIC KIDNEY DISEASE, UNSPECIFIED: ICD-10-CM

## 2024-03-31 DIAGNOSIS — E44.0 MODERATE PROTEIN-CALORIE MALNUTRITION: ICD-10-CM

## 2024-03-31 DIAGNOSIS — R65.20 SEVERE SEPSIS WITHOUT SEPTIC SHOCK: ICD-10-CM

## 2024-03-31 DIAGNOSIS — Z79.4 LONG TERM (CURRENT) USE OF INSULIN: ICD-10-CM

## 2024-03-31 DIAGNOSIS — N17.9 ACUTE KIDNEY FAILURE, UNSPECIFIED: ICD-10-CM

## 2024-03-31 DIAGNOSIS — I44.1 ATRIOVENTRICULAR BLOCK, SECOND DEGREE: ICD-10-CM

## 2024-03-31 DIAGNOSIS — J96.01 ACUTE RESPIRATORY FAILURE WITH HYPOXIA: ICD-10-CM

## 2024-03-31 DIAGNOSIS — E11.22 TYPE 2 DIABETES MELLITUS WITH DIABETIC CHRONIC KIDNEY DISEASE: ICD-10-CM

## 2024-03-31 DIAGNOSIS — K55.9 VASCULAR DISORDER OF INTESTINE, UNSPECIFIED: ICD-10-CM

## 2024-03-31 DIAGNOSIS — K65.9 PERITONITIS, UNSPECIFIED: ICD-10-CM

## 2024-03-31 DIAGNOSIS — I24.89 OTHER FORMS OF ACUTE ISCHEMIC HEART DISEASE: ICD-10-CM

## 2024-03-31 DIAGNOSIS — R47.01 APHASIA: ICD-10-CM

## 2024-03-31 DIAGNOSIS — A41.9 SEPSIS, UNSPECIFIED ORGANISM: ICD-10-CM

## 2024-03-31 DIAGNOSIS — Z90.710 ACQUIRED ABSENCE OF BOTH CERVIX AND UTERUS: ICD-10-CM

## 2024-03-31 DIAGNOSIS — Z79.01 LONG TERM (CURRENT) USE OF ANTICOAGULANTS: ICD-10-CM

## 2024-03-31 DIAGNOSIS — M25.462 EFFUSION, LEFT KNEE: ICD-10-CM

## 2024-03-31 DIAGNOSIS — R45.1 RESTLESSNESS AND AGITATION: ICD-10-CM

## 2024-03-31 DIAGNOSIS — G47.33 OBSTRUCTIVE SLEEP APNEA (ADULT) (PEDIATRIC): ICD-10-CM

## 2024-03-31 DIAGNOSIS — E78.5 HYPERLIPIDEMIA, UNSPECIFIED: ICD-10-CM

## 2024-03-31 DIAGNOSIS — K56.609 UNSPECIFIED INTESTINAL OBSTRUCTION, UNSPECIFIED AS TO PARTIAL VERSUS COMPLETE OBSTRUCTION: ICD-10-CM

## 2024-03-31 DIAGNOSIS — Z90.49 ACQUIRED ABSENCE OF OTHER SPECIFIED PARTS OF DIGESTIVE TRACT: ICD-10-CM

## 2024-03-31 DIAGNOSIS — Z45.018 ENCOUNTER FOR ADJUSTMENT AND MANAGEMENT OF OTHER PART OF CARDIAC PACEMAKER: ICD-10-CM

## 2024-03-31 DIAGNOSIS — D69.6 THROMBOCYTOPENIA, UNSPECIFIED: ICD-10-CM

## 2024-03-31 DIAGNOSIS — Z86.73 PERSONAL HISTORY OF TRANSIENT ISCHEMIC ATTACK (TIA), AND CEREBRAL INFARCTION WITHOUT RESIDUAL DEFICITS: ICD-10-CM

## 2024-09-26 ENCOUNTER — APPOINTMENT (OUTPATIENT)
Dept: ELECTROPHYSIOLOGY | Facility: CLINIC | Age: 89
End: 2024-09-26

## 2025-01-23 NOTE — ED ADULT NURSE NOTE - SUICIDE SCREENING DEPRESSION
Additional Notes: Milia extraction .  Quoted $75 cosmetic removal.   \\nWill remove at the time of cyst excision Detail Level: Simple Render Risk Assessment In Note?: no Negative